# Patient Record
Sex: MALE | Race: BLACK OR AFRICAN AMERICAN | URBAN - METROPOLITAN AREA
[De-identification: names, ages, dates, MRNs, and addresses within clinical notes are randomized per-mention and may not be internally consistent; named-entity substitution may affect disease eponyms.]

---

## 2017-03-12 ENCOUNTER — INPATIENT (INPATIENT)
Facility: HOSPITAL | Age: 67
LOS: 11 days | Discharge: HOME | End: 2017-03-24
Attending: FAMILY MEDICINE | Admitting: INTERNAL MEDICINE
Payer: COMMERCIAL

## 2017-03-12 VITALS
HEART RATE: 67 BPM | TEMPERATURE: 98 F | WEIGHT: 182.1 LBS | HEIGHT: 66 IN | SYSTOLIC BLOOD PRESSURE: 177 MMHG | OXYGEN SATURATION: 99 % | RESPIRATION RATE: 18 BRPM | DIASTOLIC BLOOD PRESSURE: 99 MMHG

## 2017-03-12 LAB
ALBUMIN SERPL ELPH-MCNC: 3 G/DL — LOW (ref 3.3–5)
ALP SERPL-CCNC: 76 U/L — SIGNIFICANT CHANGE UP (ref 40–120)
ALT FLD-CCNC: 20 U/L — SIGNIFICANT CHANGE UP (ref 12–78)
ANION GAP SERPL CALC-SCNC: 7 MMOL/L — SIGNIFICANT CHANGE UP (ref 5–17)
APPEARANCE UR: CLEAR — SIGNIFICANT CHANGE UP
APTT BLD: 27.9 SEC — SIGNIFICANT CHANGE UP (ref 27.5–37.4)
AST SERPL-CCNC: 26 U/L — SIGNIFICANT CHANGE UP (ref 15–37)
BASOPHILS # BLD AUTO: 0.1 K/UL — SIGNIFICANT CHANGE UP (ref 0–0.2)
BASOPHILS NFR BLD AUTO: 1.7 % — SIGNIFICANT CHANGE UP (ref 0–2)
BILIRUB SERPL-MCNC: 0.3 MG/DL — SIGNIFICANT CHANGE UP (ref 0.2–1.2)
BILIRUB UR-MCNC: NEGATIVE — SIGNIFICANT CHANGE UP
BUN SERPL-MCNC: 18 MG/DL — SIGNIFICANT CHANGE UP (ref 7–23)
CALCIUM SERPL-MCNC: 8.5 MG/DL — SIGNIFICANT CHANGE UP (ref 8.5–10.1)
CHLORIDE SERPL-SCNC: 107 MMOL/L — SIGNIFICANT CHANGE UP (ref 96–108)
CK SERPL-CCNC: 225 U/L — SIGNIFICANT CHANGE UP (ref 26–308)
CO2 SERPL-SCNC: 26 MMOL/L — SIGNIFICANT CHANGE UP (ref 22–31)
COLOR SPEC: YELLOW — SIGNIFICANT CHANGE UP
CREAT SERPL-MCNC: 1.13 MG/DL — SIGNIFICANT CHANGE UP (ref 0.5–1.3)
DIFF PNL FLD: NEGATIVE — SIGNIFICANT CHANGE UP
EOSINOPHIL # BLD AUTO: 0.2 K/UL — SIGNIFICANT CHANGE UP (ref 0–0.5)
EOSINOPHIL NFR BLD AUTO: 2.3 % — SIGNIFICANT CHANGE UP (ref 0–6)
GLUCOSE SERPL-MCNC: 261 MG/DL — HIGH (ref 70–99)
GLUCOSE UR QL: 250 MG/DL
HCT VFR BLD CALC: 43.6 % — SIGNIFICANT CHANGE UP (ref 39–50)
HGB BLD-MCNC: 14.6 G/DL — SIGNIFICANT CHANGE UP (ref 13–17)
INR BLD: 1.04 RATIO — SIGNIFICANT CHANGE UP (ref 0.88–1.16)
KETONES UR-MCNC: NEGATIVE — SIGNIFICANT CHANGE UP
LDH SERPL L TO P-CCNC: 326 U/L — HIGH (ref 50–242)
LEUKOCYTE ESTERASE UR-ACNC: NEGATIVE — SIGNIFICANT CHANGE UP
LIDOCAIN IGE QN: 925 U/L — HIGH (ref 73–393)
LYMPHOCYTES # BLD AUTO: 3.6 K/UL — HIGH (ref 1–3.3)
LYMPHOCYTES # BLD AUTO: 43.5 % — SIGNIFICANT CHANGE UP (ref 13–44)
MCHC RBC-ENTMCNC: 29.6 PG — SIGNIFICANT CHANGE UP (ref 27–34)
MCHC RBC-ENTMCNC: 33.6 GM/DL — SIGNIFICANT CHANGE UP (ref 32–36)
MCV RBC AUTO: 88.1 FL — SIGNIFICANT CHANGE UP (ref 80–100)
MONOCYTES # BLD AUTO: 0.5 K/UL — SIGNIFICANT CHANGE UP (ref 0–0.9)
MONOCYTES NFR BLD AUTO: 6.2 % — SIGNIFICANT CHANGE UP (ref 2–14)
NEUTROPHILS # BLD AUTO: 3.8 K/UL — SIGNIFICANT CHANGE UP (ref 1.8–7.4)
NEUTROPHILS NFR BLD AUTO: 46.3 % — SIGNIFICANT CHANGE UP (ref 43–77)
NITRITE UR-MCNC: NEGATIVE — SIGNIFICANT CHANGE UP
PH UR: 6 — SIGNIFICANT CHANGE UP (ref 4.8–8)
PLATELET # BLD AUTO: 248 K/UL — SIGNIFICANT CHANGE UP (ref 150–400)
POTASSIUM SERPL-MCNC: 4.5 MMOL/L — SIGNIFICANT CHANGE UP (ref 3.5–5.3)
POTASSIUM SERPL-SCNC: 4.5 MMOL/L — SIGNIFICANT CHANGE UP (ref 3.5–5.3)
PROT SERPL-MCNC: 7 GM/DL — SIGNIFICANT CHANGE UP (ref 6–8.3)
PROT UR-MCNC: NEGATIVE MG/DL — SIGNIFICANT CHANGE UP
PROTHROM AB SERPL-ACNC: 11.4 SEC — SIGNIFICANT CHANGE UP (ref 10–13.1)
RBC # BLD: 4.95 M/UL — SIGNIFICANT CHANGE UP (ref 4.2–5.8)
RBC # FLD: 11.7 % — SIGNIFICANT CHANGE UP (ref 10.3–14.5)
SODIUM SERPL-SCNC: 140 MMOL/L — SIGNIFICANT CHANGE UP (ref 135–145)
SP GR SPEC: 1.01 — SIGNIFICANT CHANGE UP (ref 1.01–1.02)
TROPONIN I SERPL-MCNC: <0.015 NG/ML — SIGNIFICANT CHANGE UP (ref 0.01–0.04)
UROBILINOGEN FLD QL: NEGATIVE MG/DL — SIGNIFICANT CHANGE UP
WBC # BLD: 8.2 K/UL — SIGNIFICANT CHANGE UP (ref 3.8–10.5)
WBC # FLD AUTO: 8.2 K/UL — SIGNIFICANT CHANGE UP (ref 3.8–10.5)

## 2017-03-12 PROCEDURE — 74177 CT ABD & PELVIS W/CONTRAST: CPT | Mod: 26

## 2017-03-12 PROCEDURE — 99285 EMERGENCY DEPT VISIT HI MDM: CPT

## 2017-03-12 PROCEDURE — 93010 ELECTROCARDIOGRAM REPORT: CPT

## 2017-03-12 PROCEDURE — 71020: CPT | Mod: 26

## 2017-03-12 PROCEDURE — 76705 ECHO EXAM OF ABDOMEN: CPT | Mod: 26

## 2017-03-12 RX ORDER — MORPHINE SULFATE 50 MG/1
4 CAPSULE, EXTENDED RELEASE ORAL ONCE
Qty: 0 | Refills: 0 | Status: DISCONTINUED | OUTPATIENT
Start: 2017-03-12 | End: 2017-03-12

## 2017-03-12 RX ORDER — SODIUM CHLORIDE 9 MG/ML
3 INJECTION INTRAMUSCULAR; INTRAVENOUS; SUBCUTANEOUS ONCE
Qty: 0 | Refills: 0 | Status: COMPLETED | OUTPATIENT
Start: 2017-03-12 | End: 2017-03-12

## 2017-03-12 RX ORDER — SODIUM CHLORIDE 9 MG/ML
1000 INJECTION INTRAMUSCULAR; INTRAVENOUS; SUBCUTANEOUS ONCE
Qty: 0 | Refills: 0 | Status: COMPLETED | OUTPATIENT
Start: 2017-03-12 | End: 2017-03-12

## 2017-03-12 RX ORDER — SODIUM CHLORIDE 9 MG/ML
2000 INJECTION INTRAMUSCULAR; INTRAVENOUS; SUBCUTANEOUS ONCE
Qty: 0 | Refills: 0 | Status: COMPLETED | OUTPATIENT
Start: 2017-03-12 | End: 2017-03-12

## 2017-03-12 RX ORDER — ONDANSETRON 8 MG/1
4 TABLET, FILM COATED ORAL ONCE
Qty: 0 | Refills: 0 | Status: COMPLETED | OUTPATIENT
Start: 2017-03-12 | End: 2017-03-12

## 2017-03-12 RX ADMIN — SODIUM CHLORIDE 3 MILLILITER(S): 9 INJECTION INTRAMUSCULAR; INTRAVENOUS; SUBCUTANEOUS at 21:15

## 2017-03-12 RX ADMIN — SODIUM CHLORIDE 1000 MILLILITER(S): 9 INJECTION INTRAMUSCULAR; INTRAVENOUS; SUBCUTANEOUS at 23:48

## 2017-03-12 RX ADMIN — SODIUM CHLORIDE 1000 MILLILITER(S): 9 INJECTION INTRAMUSCULAR; INTRAVENOUS; SUBCUTANEOUS at 21:25

## 2017-03-12 RX ADMIN — ONDANSETRON 4 MILLIGRAM(S): 8 TABLET, FILM COATED ORAL at 21:24

## 2017-03-12 RX ADMIN — MORPHINE SULFATE 4 MILLIGRAM(S): 50 CAPSULE, EXTENDED RELEASE ORAL at 21:24

## 2017-03-12 NOTE — ED STATDOCS - NS ED MD SCRIBE ATTENDING SCRIBE SECTIONS
PHYSICAL EXAM/DISPOSITION/REVIEW OF SYSTEMS/PAST MEDICAL/SURGICAL/SOCIAL HISTORY/HISTORY OF PRESENT ILLNESS RESULTS/PHYSICAL EXAM/HISTORY OF PRESENT ILLNESS/PAST MEDICAL/SURGICAL/SOCIAL HISTORY/REVIEW OF SYSTEMS/DISPOSITION

## 2017-03-12 NOTE — ED STATDOCS - DETAILS:
Attending Contribution to Care: I, Dayna Rodriguez, performed the initial face to face bedside interview with this patient regarding history of present illness, review of symptoms and relevant past medical, social and family history.  I completed an independent physical examination.  I was the initial provider who evaluated this patient. I have signed out the follow up of any pending tests (i.e. labs, radiological studies) to the ACP.  I have communicated the patient’s plan of care and disposition with the ACP.

## 2017-03-12 NOTE — ED STATDOCS - OBJECTIVE STATEMENT
68 y/o M with hx of cholecystectomy 2 years ago by Dr Welch, HTN, HLD, DM type 2 on metformin and pancreatitis presents to ED for abdominal pain x5 days. Pain is epigastric and wraps around to both sides. Pain is worse when eating heavier foods. + nausea and mild SOB. Denies CP, VD, fever, sweats. Sx are similar to when he had pancreatitis. No other complaints. PMD-Yuri.

## 2017-03-13 DIAGNOSIS — Z29.9 ENCOUNTER FOR PROPHYLACTIC MEASURES, UNSPECIFIED: ICD-10-CM

## 2017-03-13 DIAGNOSIS — E11.9 TYPE 2 DIABETES MELLITUS WITHOUT COMPLICATIONS: ICD-10-CM

## 2017-03-13 DIAGNOSIS — Z90.49 ACQUIRED ABSENCE OF OTHER SPECIFIED PARTS OF DIGESTIVE TRACT: Chronic | ICD-10-CM

## 2017-03-13 DIAGNOSIS — I10 ESSENTIAL (PRIMARY) HYPERTENSION: ICD-10-CM

## 2017-03-13 DIAGNOSIS — K85.00 IDIOPATHIC ACUTE PANCREATITIS WITHOUT NECROSIS OR INFECTION: ICD-10-CM

## 2017-03-13 LAB
ALBUMIN SERPL ELPH-MCNC: 2.7 G/DL — LOW (ref 3.3–5)
ALP SERPL-CCNC: 71 U/L — SIGNIFICANT CHANGE UP (ref 40–120)
ALT FLD-CCNC: 16 U/L — SIGNIFICANT CHANGE UP (ref 12–78)
ANION GAP SERPL CALC-SCNC: 7 MMOL/L — SIGNIFICANT CHANGE UP (ref 5–17)
AST SERPL-CCNC: 14 U/L — LOW (ref 15–37)
BASOPHILS # BLD AUTO: 0.1 K/UL — SIGNIFICANT CHANGE UP (ref 0–0.2)
BASOPHILS NFR BLD AUTO: 1.2 % — SIGNIFICANT CHANGE UP (ref 0–2)
BILIRUB SERPL-MCNC: 0.2 MG/DL — SIGNIFICANT CHANGE UP (ref 0.2–1.2)
BUN SERPL-MCNC: 11 MG/DL — SIGNIFICANT CHANGE UP (ref 7–23)
CALCIUM SERPL-MCNC: 7.8 MG/DL — LOW (ref 8.5–10.1)
CHLORIDE SERPL-SCNC: 108 MMOL/L — SIGNIFICANT CHANGE UP (ref 96–108)
CHOLEST SERPL-MCNC: 142 MG/DL — SIGNIFICANT CHANGE UP (ref 10–199)
CK SERPL-CCNC: 158 U/L — SIGNIFICANT CHANGE UP (ref 26–308)
CO2 SERPL-SCNC: 28 MMOL/L — SIGNIFICANT CHANGE UP (ref 22–31)
CREAT SERPL-MCNC: 0.88 MG/DL — SIGNIFICANT CHANGE UP (ref 0.5–1.3)
EOSINOPHIL # BLD AUTO: 0.2 K/UL — SIGNIFICANT CHANGE UP (ref 0–0.5)
EOSINOPHIL NFR BLD AUTO: 2 % — SIGNIFICANT CHANGE UP (ref 0–6)
GLUCOSE SERPL-MCNC: 67 MG/DL — LOW (ref 70–99)
HCT VFR BLD CALC: 42.1 % — SIGNIFICANT CHANGE UP (ref 39–50)
HDLC SERPL-MCNC: 39 MG/DL — LOW (ref 40–125)
HGB BLD-MCNC: 13.8 G/DL — SIGNIFICANT CHANGE UP (ref 13–17)
LACTATE SERPL-SCNC: 1 MMOL/L — SIGNIFICANT CHANGE UP (ref 0.7–2)
LIPID PNL WITH DIRECT LDL SERPL: 87 MG/DL — SIGNIFICANT CHANGE UP
LYMPHOCYTES # BLD AUTO: 3.6 K/UL — HIGH (ref 1–3.3)
LYMPHOCYTES # BLD AUTO: 41.7 % — SIGNIFICANT CHANGE UP (ref 13–44)
MAGNESIUM SERPL-MCNC: 1.9 MG/DL — SIGNIFICANT CHANGE UP (ref 1.8–2.4)
MCHC RBC-ENTMCNC: 29.3 PG — SIGNIFICANT CHANGE UP (ref 27–34)
MCHC RBC-ENTMCNC: 32.7 GM/DL — SIGNIFICANT CHANGE UP (ref 32–36)
MCV RBC AUTO: 89.6 FL — SIGNIFICANT CHANGE UP (ref 80–100)
MONOCYTES # BLD AUTO: 0.8 K/UL — SIGNIFICANT CHANGE UP (ref 0–0.9)
MONOCYTES NFR BLD AUTO: 9.6 % — SIGNIFICANT CHANGE UP (ref 2–14)
NEUTROPHILS # BLD AUTO: 4 K/UL — SIGNIFICANT CHANGE UP (ref 1.8–7.4)
NEUTROPHILS NFR BLD AUTO: 45.5 % — SIGNIFICANT CHANGE UP (ref 43–77)
PLATELET # BLD AUTO: 252 K/UL — SIGNIFICANT CHANGE UP (ref 150–400)
POTASSIUM SERPL-MCNC: 3.3 MMOL/L — LOW (ref 3.5–5.3)
POTASSIUM SERPL-SCNC: 3.3 MMOL/L — LOW (ref 3.5–5.3)
PROT SERPL-MCNC: 6.2 GM/DL — SIGNIFICANT CHANGE UP (ref 6–8.3)
RBC # BLD: 4.7 M/UL — SIGNIFICANT CHANGE UP (ref 4.2–5.8)
RBC # FLD: 11.7 % — SIGNIFICANT CHANGE UP (ref 10.3–14.5)
SODIUM SERPL-SCNC: 143 MMOL/L — SIGNIFICANT CHANGE UP (ref 135–145)
TOTAL CHOLESTEROL/HDL RATIO MEASUREMENT: 3.6 RATIO — SIGNIFICANT CHANGE UP (ref 3.4–9.6)
TRIGL SERPL-MCNC: 82 MG/DL — SIGNIFICANT CHANGE UP (ref 10–149)
TROPONIN I SERPL-MCNC: <0.015 NG/ML — SIGNIFICANT CHANGE UP (ref 0.01–0.04)
WBC # BLD: 8.7 K/UL — SIGNIFICANT CHANGE UP (ref 3.8–10.5)
WBC # FLD AUTO: 8.7 K/UL — SIGNIFICANT CHANGE UP (ref 3.8–10.5)

## 2017-03-13 RX ORDER — ONDANSETRON 8 MG/1
4 TABLET, FILM COATED ORAL EVERY 6 HOURS
Qty: 0 | Refills: 0 | Status: DISCONTINUED | OUTPATIENT
Start: 2017-03-13 | End: 2017-03-24

## 2017-03-13 RX ORDER — ACETAMINOPHEN 500 MG
650 TABLET ORAL EVERY 6 HOURS
Qty: 0 | Refills: 0 | Status: DISCONTINUED | OUTPATIENT
Start: 2017-03-13 | End: 2017-03-24

## 2017-03-13 RX ORDER — MORPHINE SULFATE 50 MG/1
2 CAPSULE, EXTENDED RELEASE ORAL ONCE
Qty: 0 | Refills: 0 | Status: DISCONTINUED | OUTPATIENT
Start: 2017-03-13 | End: 2017-03-13

## 2017-03-13 RX ORDER — INSULIN LISPRO 100/ML
VIAL (ML) SUBCUTANEOUS EVERY 6 HOURS
Qty: 0 | Refills: 0 | Status: DISCONTINUED | OUTPATIENT
Start: 2017-03-13 | End: 2017-03-14

## 2017-03-13 RX ORDER — DEXTROSE 50 % IN WATER 50 %
25 SYRINGE (ML) INTRAVENOUS ONCE
Qty: 0 | Refills: 0 | Status: DISCONTINUED | OUTPATIENT
Start: 2017-03-13 | End: 2017-03-14

## 2017-03-13 RX ORDER — MORPHINE SULFATE 50 MG/1
2 CAPSULE, EXTENDED RELEASE ORAL EVERY 6 HOURS
Qty: 0 | Refills: 0 | Status: DISCONTINUED | OUTPATIENT
Start: 2017-03-13 | End: 2017-03-13

## 2017-03-13 RX ORDER — SIMETHICONE 80 MG/1
80 TABLET, CHEWABLE ORAL EVERY 6 HOURS
Qty: 0 | Refills: 0 | Status: DISCONTINUED | OUTPATIENT
Start: 2017-03-13 | End: 2017-03-24

## 2017-03-13 RX ORDER — MORPHINE SULFATE 50 MG/1
4 CAPSULE, EXTENDED RELEASE ORAL EVERY 4 HOURS
Qty: 0 | Refills: 0 | Status: DISCONTINUED | OUTPATIENT
Start: 2017-03-13 | End: 2017-03-13

## 2017-03-13 RX ORDER — SODIUM CHLORIDE 9 MG/ML
1000 INJECTION, SOLUTION INTRAVENOUS
Qty: 0 | Refills: 0 | Status: DISCONTINUED | OUTPATIENT
Start: 2017-03-13 | End: 2017-03-14

## 2017-03-13 RX ORDER — SODIUM CHLORIDE 9 MG/ML
1000 INJECTION INTRAMUSCULAR; INTRAVENOUS; SUBCUTANEOUS ONCE
Qty: 0 | Refills: 0 | Status: DISCONTINUED | OUTPATIENT
Start: 2017-03-13 | End: 2017-03-24

## 2017-03-13 RX ORDER — LISINOPRIL 2.5 MG/1
40 TABLET ORAL DAILY
Qty: 0 | Refills: 0 | Status: DISCONTINUED | OUTPATIENT
Start: 2017-03-13 | End: 2017-03-13

## 2017-03-13 RX ORDER — DEXTROSE 50 % IN WATER 50 %
12.5 SYRINGE (ML) INTRAVENOUS ONCE
Qty: 0 | Refills: 0 | Status: DISCONTINUED | OUTPATIENT
Start: 2017-03-13 | End: 2017-03-14

## 2017-03-13 RX ORDER — SODIUM CHLORIDE 9 MG/ML
1000 INJECTION INTRAMUSCULAR; INTRAVENOUS; SUBCUTANEOUS
Qty: 0 | Refills: 0 | Status: DISCONTINUED | OUTPATIENT
Start: 2017-03-13 | End: 2017-03-19

## 2017-03-13 RX ORDER — DOCUSATE SODIUM 100 MG
100 CAPSULE ORAL THREE TIMES A DAY
Qty: 0 | Refills: 0 | Status: DISCONTINUED | OUTPATIENT
Start: 2017-03-13 | End: 2017-03-24

## 2017-03-13 RX ORDER — HYDROMORPHONE HYDROCHLORIDE 2 MG/ML
1 INJECTION INTRAMUSCULAR; INTRAVENOUS; SUBCUTANEOUS
Qty: 0 | Refills: 0 | Status: DISCONTINUED | OUTPATIENT
Start: 2017-03-13 | End: 2017-03-13

## 2017-03-13 RX ORDER — POTASSIUM CHLORIDE 20 MEQ
10 PACKET (EA) ORAL
Qty: 0 | Refills: 0 | Status: COMPLETED | OUTPATIENT
Start: 2017-03-13 | End: 2017-03-13

## 2017-03-13 RX ORDER — SENNA PLUS 8.6 MG/1
2 TABLET ORAL AT BEDTIME
Qty: 0 | Refills: 0 | Status: DISCONTINUED | OUTPATIENT
Start: 2017-03-13 | End: 2017-03-24

## 2017-03-13 RX ORDER — DEXTROSE 50 % IN WATER 50 %
1 SYRINGE (ML) INTRAVENOUS ONCE
Qty: 0 | Refills: 0 | Status: DISCONTINUED | OUTPATIENT
Start: 2017-03-13 | End: 2017-03-14

## 2017-03-13 RX ORDER — MORPHINE SULFATE 50 MG/1
2 CAPSULE, EXTENDED RELEASE ORAL
Qty: 0 | Refills: 0 | Status: DISCONTINUED | OUTPATIENT
Start: 2017-03-13 | End: 2017-03-13

## 2017-03-13 RX ORDER — MORPHINE SULFATE 50 MG/1
4 CAPSULE, EXTENDED RELEASE ORAL ONCE
Qty: 0 | Refills: 0 | Status: DISCONTINUED | OUTPATIENT
Start: 2017-03-13 | End: 2017-03-13

## 2017-03-13 RX ORDER — GLUCAGON INJECTION, SOLUTION 0.5 MG/.1ML
1 INJECTION, SOLUTION SUBCUTANEOUS ONCE
Qty: 0 | Refills: 0 | Status: DISCONTINUED | OUTPATIENT
Start: 2017-03-13 | End: 2017-03-14

## 2017-03-13 RX ORDER — HYDROMORPHONE HYDROCHLORIDE 2 MG/ML
0.5 INJECTION INTRAMUSCULAR; INTRAVENOUS; SUBCUTANEOUS
Qty: 0 | Refills: 0 | Status: DISCONTINUED | OUTPATIENT
Start: 2017-03-13 | End: 2017-03-20

## 2017-03-13 RX ADMIN — HYDROMORPHONE HYDROCHLORIDE 0.5 MILLIGRAM(S): 2 INJECTION INTRAMUSCULAR; INTRAVENOUS; SUBCUTANEOUS at 14:35

## 2017-03-13 RX ADMIN — HYDROMORPHONE HYDROCHLORIDE 0.5 MILLIGRAM(S): 2 INJECTION INTRAMUSCULAR; INTRAVENOUS; SUBCUTANEOUS at 14:55

## 2017-03-13 RX ADMIN — MORPHINE SULFATE 2 MILLIGRAM(S): 50 CAPSULE, EXTENDED RELEASE ORAL at 04:24

## 2017-03-13 RX ADMIN — SIMETHICONE 80 MILLIGRAM(S): 80 TABLET, CHEWABLE ORAL at 13:11

## 2017-03-13 RX ADMIN — MORPHINE SULFATE 4 MILLIGRAM(S): 50 CAPSULE, EXTENDED RELEASE ORAL at 01:51

## 2017-03-13 RX ADMIN — Medication 100 MILLIEQUIVALENT(S): at 13:08

## 2017-03-13 RX ADMIN — HYDROMORPHONE HYDROCHLORIDE 0.5 MILLIGRAM(S): 2 INJECTION INTRAMUSCULAR; INTRAVENOUS; SUBCUTANEOUS at 22:11

## 2017-03-13 RX ADMIN — MORPHINE SULFATE 2 MILLIGRAM(S): 50 CAPSULE, EXTENDED RELEASE ORAL at 04:27

## 2017-03-13 RX ADMIN — MORPHINE SULFATE 2 MILLIGRAM(S): 50 CAPSULE, EXTENDED RELEASE ORAL at 12:05

## 2017-03-13 RX ADMIN — MORPHINE SULFATE 2 MILLIGRAM(S): 50 CAPSULE, EXTENDED RELEASE ORAL at 06:56

## 2017-03-13 RX ADMIN — LISINOPRIL 40 MILLIGRAM(S): 2.5 TABLET ORAL at 05:02

## 2017-03-13 RX ADMIN — Medication 100 MILLIEQUIVALENT(S): at 15:16

## 2017-03-13 RX ADMIN — SODIUM CHLORIDE 125 MILLILITER(S): 9 INJECTION INTRAMUSCULAR; INTRAVENOUS; SUBCUTANEOUS at 13:08

## 2017-03-13 RX ADMIN — SODIUM CHLORIDE 125 MILLILITER(S): 9 INJECTION INTRAMUSCULAR; INTRAVENOUS; SUBCUTANEOUS at 05:03

## 2017-03-13 RX ADMIN — Medication 100 MILLIEQUIVALENT(S): at 14:17

## 2017-03-13 NOTE — CONSULT NOTE ADULT - SUBJECTIVE AND OBJECTIVE BOX
HPI:  66 yo man was admitted with epigastric pain over the past few days. Worse after food. No fever. No n/v. Has hx of recurrent pancreatitis, etiology unclear. S/P cholecystectomy. EUS performed notable for mild parenchymal changes, but did not meet all EUS criteria for chronic pancreatitis. Thought was that pancreatitis may be due to ACE-I.    Still with pain. No n/v. No fever.     PAST MEDICAL & SURGICAL HISTORY:  DM (diabetes mellitus)  HTN (hypertension)  Pancreatitis  S/P laparoscopic cholecystectomy      MEDICATIONS  (STANDING):  sodium chloride 0.9% Bolus 1000milliLiter(s) IV Bolus once  lisinopril 40milliGRAM(s) Oral daily  sodium chloride 0.9%. 1000milliLiter(s) IV Continuous <Continuous>    MEDICATIONS  (PRN):  acetaminophen   Tablet 650milliGRAM(s) Oral every 6 hours PRN For Temp greater than 38 C (100.4 F)  acetaminophen   Tablet. 650milliGRAM(s) Oral every 6 hours PRN Mild Pain (1 - 3)  ondansetron Injectable 4milliGRAM(s) IV Push every 6 hours PRN Nausea  senna 2Tablet(s) Oral at bedtime PRN Constipation  docusate sodium 100milliGRAM(s) Oral three times a day PRN Constipation  morphine  - Injectable 2milliGRAM(s) IV Push every 6 hours PRN Moderate Pain (4 - 6)  morphine  - Injectable 4milliGRAM(s) IV Push every 4 hours PRN Severe Pain (7 - 10)      Allergies    No Known Allergies    Intolerances        SOCIAL HISTORY:  No smoking, social alcohol use, no recreational drug use    FAMILY HISTORY:  No pertinent family history in first degree relatives      REVIEW OF SYSTEMS    General: no unexpected weight loss    HEENT: no icterus    Respiratory and Thorax: no SOB  	  Cardiovascular: no CP    Gastrointestinal: as above    : no dysuria    Musculoskeletal: no myalgias    Skin: no jaundice    Neuro: no headaches or dizziness    Vital Signs Last 24 Hrs  T(C): 36.4, Max: 36.8 (03-13 @ 01:19)  T(F): 97.5, Max: 98.2 (03-13 @ 01:19)  HR: 64 (53 - 67)  BP: 138/70 (132/75 - 177/99)  BP(mean): --  RR: 18 (16 - 18)  SpO2: 100% (97% - 100%)    PHYSICAL EXAM:    Constitutional: NAD    Head: NCAT    HEENT: anicteric    Neck: no abnormal lymphadenopathy    Respiratory: CTA BL    Cardiovascular:  RRR    Gastrointestinal: soft ND +BS +epigastric tenderness    Extremities: no LE edema    Neuro: no focal deficits    Skin: no jaundice      LABS:                        13.8   8.7   )-----------( 252      ( 13 Mar 2017 07:23 )             42.1     13 Mar 2017 07:23    143    |  108    |  11     ----------------------------<  67     3.3     |  28     |  0.88     Ca    7.8        13 Mar 2017 07:23  Mg     1.9       13 Mar 2017 07:23    TPro  6.2    /  Alb  2.7    /  TBili  0.2    /  DBili  x      /  AST  14     /  ALT  16     /  AlkPhos  71     13 Mar 2017 07:23    PT/INR - ( 12 Mar 2017 21:22 )   PT: 11.4 sec;   INR: 1.04 ratio         PTT - ( 12 Mar 2017 21:22 )  PTT:27.9 sec  LIVER FUNCTIONS - ( 13 Mar 2017 07:23 )  Alb: 2.7 g/dL / Pro: 6.2 gm/dL / ALK PHOS: 71 U/L / ALT: 16 U/L / AST: 14 U/L / GGT: x             RADIOLOGY & ADDITIONAL STUDIES:  EXAM:  CT ABDOMEN AND PELVIS IC                            PROCEDURE DATE:  03/12/2017        INTERPRETATION:  Abdominal/Pelvic CT    3/13/2017 12:18 AM    Indication: Abdominal pain, pancreatitis    Technique: Axial images were obtained following IV contrast from the lung   bases through pubic symphysis.  90 cc of Omnipaque 350 was administered   intravenously without complication and 10 cc was discarded.  Reformatted   coronal and sagittal images are submitted.    Comparison: MRI of July 18, 2016    FINDINGS:    LUNG BASES:  There are dependent atelectatic changes at the lung bases.   PERITONEUM:  There is no free air or focal collection.  No free fluid.  LIVER: Normal.  SPLEEN: Normal.  GALLBLADDER: Not visualized.  BILIARY TREE: Unremarkable.  PANCREAS: Normal.  ADRENAL GLANDS: Normal.  KIDNEYS: Normal.  BOWEL: The stomach is incompletely distended.  There is no small bowel   obstruction, focal bowel wall thickening. The appendix is unremarkable.   Diverticulosis. Moderate fecal load.    URINARY BLADDER: Unremarkable.  PELVIC ORGANS: The prostate is enlarged, measuring 5.6 x 5.1 x 5.2 cm.    There is no significant adenopathy.  VASCULATURE: Unremarkable.  RETROPERITONEUM:  There is no mass.  BONES: Unremarkable.  ABDOMINAL WALL:Fat-containing of focal hernia.    IMPRESSION:    No explanation for abdominal pain on this CT. unremarkable CT appearance   of the pancreas.  Moderate fecal load.  Enlarged prostate gland.

## 2017-03-13 NOTE — PATIENT PROFILE ADULT. - VISION (WITH CORRECTIVE LENSES IF THE PATIENT USUALLY WEARS THEM):
Partially impaired: cannot see medication labels or newsprint, but can see obstacles in path, and the surrounding layout; can count fingers at arm's length/one pair

## 2017-03-13 NOTE — ED ADULT NURSE NOTE - ED STAT RN HANDOFF DETAILS
Received patient from Elieser LARSON @ 0230 am- No acute distress noted- Report given to MARSHA Zepeda @ 2SW @ 1000- Safety maintained

## 2017-03-13 NOTE — H&P ADULT - PROBLEM SELECTOR PLAN 1
~admit to Medicine  ~f/u w/ GI consult  ~NPO for now; advance as tolerated  ~f/u Lipid profile  ~cont. IV hydration  ~cont. pain management

## 2017-03-13 NOTE — H&P ADULT - NSHPPHYSICALEXAM_GEN_ALL_CORE
Vital Signs Last 24 Hrs  T(C): 36.3, Max: 36.8 (03-13 @ 01:19)  T(F): 97.4, Max: 98.2 (03-13 @ 01:19)  HR: 53 (53 - 67)  BP: 144/87 (144/87 - 177/99)  BP(mean): --  RR: 16 (16 - 18)  SpO2: 100% (97% - 100%)

## 2017-03-13 NOTE — H&P ADULT - HISTORY OF PRESENT ILLNESS
68 y/o M PMHx significant for DM type 2, HLD, HTN, and recurrent Pancreatitis, s/p cholecystectomy, who presents to the ED w/ c/o epigastric abdominal pain, moderate intensity, radiating around to the back, aggravated with food. The patient notes that his symptoms have progressively worsened over the past few 4-5 days. The patient denies any associated fevers. The patient has had prior admissions for pancreatitis. In the ED the patient was found to have an elevated Lipase 925.

## 2017-03-13 NOTE — CHART NOTE - NSCHARTNOTEFT_GEN_A_CORE
This is addendum to admission note     Pt was seen and examined, Chart, meds,  labs and radiology reviwed. Pt reports some improvement of pain. Felt some HA and dizziness yearly am, resolved now.  POC discussed     Vital Signs Last 24 Hrs  T(C): 36.7, Max: 36.8 ( @ 01:19)  T(F): 98.1, Max: 98.2 ( @ 01:19)  HR: 59 (53 - 67)  BP: 125/69 (114/57 - 177/99)  BP(mean): --  RR: 18 (16 - 18)  SpO2: 97% (97% - 100%)                          13.8   8.7   )-----------( 252      ( 13 Mar 2017 07:23 )             42.1     13 Mar 2017 07:23    143    |  108    |  11     ----------------------------<  67     3.3     |  28     |  0.88     Ca    7.8        13 Mar 2017 07:23  Mg     1.9       13 Mar 2017 07:23    TPro  6.2    /  Alb  2.7    /  TBili  0.2    /  DBili  x      /  AST  14     /  ALT  16     /  AlkPhos  71     13 Mar 2017 07:23    CARDIAC MARKERS ( 13 Mar 2017 06:48 )  <0.015 ng/mL / x     / 158 U/L / x     / x      CARDIAC MARKERS ( 12 Mar 2017 21:22 )  <0.015 ng/mL / x     / 225 U/L / x     / x        LIVER FUNCTIONS - ( 13 Mar 2017 07:23 )  Alb: 2.7 g/dL / Pro: 6.2 gm/dL / ALK PHOS: 71 U/L / ALT: 16 U/L / AST: 14 U/L / GGT: x           PT/INR - ( 12 Mar 2017 21:22 )   PT: 11.4 sec;   INR: 1.04 ratio      PTT - ( 12 Mar 2017 21:22 )  PTT:27.9 sec  Urinalysis Basic - ( 12 Mar 2017 21:22 )  Color: Yellow / Appearance: Clear / S.015 / pH: x  Gluc: x / Ketone: Negative  / Bili: Negative / Urobili: Negative mg/dL   Blood: x / Protein: Negative mg/dL / Nitrite: Negative   Leuk Esterase: Negative / RBC: x / WBC x   Sq Epi: x / Non Sq Epi: x / Bacteria: x    Lactate, Blood: 1.0 mmol/L ( @ 23:47)    Lipid Profile (17 @ 07:23)    HDL/Total Cholesterol Ratio Measurement: 3.6 RATIO    Cholesterol, Serum: 142 mg/dL    Triglycerides, Serum: 82 mg/dL    HDL Cholesterol, Serum: 39 mg/dL    Direct LDL: 87: LDL Cholesterol --- Interpretive Comment (for adults 18 and over)  Optimal LDL Level may vary based on clinical situation  Below 70                  Ideal for people at very high risk of heart  disease  Below 100                Ideal for people at ris87: k of heart disease  100 - 129                   [place]  130 - 159                   Borderline high  160 - 189                   High  190 and Above          Very high mg/dL    Assessment and Plan: 	  68 y/o M PMHx significant for DM type 2, HLD, HTN, and recurrent Pancreatitis, s/p cholecystectomy, who presents to the ED w/ c/o epigastric abdominal pain, moderate intensity, radiating around to the back, aggravated with food, symptoms have progressively worsened over the past few 4-5 days. The patient denies  any associated fevers. The patient has had prior admissions for pancreatitis.     1. Idiopathic acute pancreatitis without infection or necrosis.   C/w NPO for now;  - IVF   - control pain   -  IV hydration  - D/w Dr. Louis, had EUS, has some parenchymal changes, but did not meet criteria for Chronic pancreatitis. Possibly 2/2 ACEI   - D/c lisinopril for now        2. Type 2 diabetes mellitus without complication, with long-term current use of insulin.    - FS q6h  - ISS        3. Essential hypertension.   - d/c  Lisinopril.   - Monitor BP, will start on other antiHTN if start trending up     4. DVT/GI PPxs

## 2017-03-13 NOTE — CONSULT NOTE ADULT - ASSESSMENT
68 yo man admitted with recurrent pancreatitis    -DC ACE I, should start an alternative anti-hypertensive  -NPO  -IVF  -Trend BUN, HCT, UOP

## 2017-03-14 DIAGNOSIS — E83.42 HYPOMAGNESEMIA: ICD-10-CM

## 2017-03-14 DIAGNOSIS — E87.6 HYPOKALEMIA: ICD-10-CM

## 2017-03-14 LAB
AMYLASE P1 CFR SERPL: 94 U/L — SIGNIFICANT CHANGE UP (ref 25–115)
ANION GAP SERPL CALC-SCNC: 8 MMOL/L — SIGNIFICANT CHANGE UP (ref 5–17)
BUN SERPL-MCNC: 7 MG/DL — SIGNIFICANT CHANGE UP (ref 7–23)
CALCIUM SERPL-MCNC: 8.5 MG/DL — SIGNIFICANT CHANGE UP (ref 8.5–10.1)
CHLORIDE SERPL-SCNC: 106 MMOL/L — SIGNIFICANT CHANGE UP (ref 96–108)
CO2 SERPL-SCNC: 27 MMOL/L — SIGNIFICANT CHANGE UP (ref 22–31)
CREAT SERPL-MCNC: 0.95 MG/DL — SIGNIFICANT CHANGE UP (ref 0.5–1.3)
FERRITIN SERPL-MCNC: 118.7 NG/ML — SIGNIFICANT CHANGE UP (ref 30–400)
GLUCOSE SERPL-MCNC: 78 MG/DL — SIGNIFICANT CHANGE UP (ref 70–99)
HBA1C BLD-MCNC: 11.7 % — HIGH (ref 4–5.6)
IRON SATN MFR SERPL: 13 % — LOW (ref 16–55)
IRON SATN MFR SERPL: 31 UG/DL — LOW (ref 45–165)
LIDOCAIN IGE QN: 384 U/L — SIGNIFICANT CHANGE UP (ref 73–393)
MAGNESIUM SERPL-MCNC: 1.8 MG/DL — SIGNIFICANT CHANGE UP (ref 1.8–2.4)
PHOSPHATE SERPL-MCNC: 2.8 MG/DL — SIGNIFICANT CHANGE UP (ref 2.5–4.5)
POTASSIUM SERPL-MCNC: 3.9 MMOL/L — SIGNIFICANT CHANGE UP (ref 3.5–5.3)
POTASSIUM SERPL-SCNC: 3.9 MMOL/L — SIGNIFICANT CHANGE UP (ref 3.5–5.3)
SODIUM SERPL-SCNC: 141 MMOL/L — SIGNIFICANT CHANGE UP (ref 135–145)
TIBC SERPL-MCNC: 237 UG/DL — SIGNIFICANT CHANGE UP (ref 220–430)
TRANSFERRIN SERPL-MCNC: 174 MG/DL — LOW (ref 200–360)
UIBC SERPL-MCNC: 206 UG/DL — SIGNIFICANT CHANGE UP (ref 110–370)

## 2017-03-14 RX ORDER — INSULIN LISPRO 100/ML
VIAL (ML) SUBCUTANEOUS AT BEDTIME
Qty: 0 | Refills: 0 | Status: DISCONTINUED | OUTPATIENT
Start: 2017-03-14 | End: 2017-03-23

## 2017-03-14 RX ORDER — DEXTROSE 50 % IN WATER 50 %
25 SYRINGE (ML) INTRAVENOUS ONCE
Qty: 0 | Refills: 0 | Status: DISCONTINUED | OUTPATIENT
Start: 2017-03-14 | End: 2017-03-23

## 2017-03-14 RX ORDER — DEXTROSE 50 % IN WATER 50 %
1 SYRINGE (ML) INTRAVENOUS ONCE
Qty: 0 | Refills: 0 | Status: DISCONTINUED | OUTPATIENT
Start: 2017-03-14 | End: 2017-03-23

## 2017-03-14 RX ORDER — PANTOPRAZOLE SODIUM 20 MG/1
40 TABLET, DELAYED RELEASE ORAL DAILY
Qty: 0 | Refills: 0 | Status: DISCONTINUED | OUTPATIENT
Start: 2017-03-14 | End: 2017-03-24

## 2017-03-14 RX ORDER — INSULIN LISPRO 100/ML
VIAL (ML) SUBCUTANEOUS
Qty: 0 | Refills: 0 | Status: DISCONTINUED | OUTPATIENT
Start: 2017-03-14 | End: 2017-03-23

## 2017-03-14 RX ORDER — DEXTROSE 50 % IN WATER 50 %
12.5 SYRINGE (ML) INTRAVENOUS ONCE
Qty: 0 | Refills: 0 | Status: DISCONTINUED | OUTPATIENT
Start: 2017-03-14 | End: 2017-03-23

## 2017-03-14 RX ORDER — SODIUM CHLORIDE 9 MG/ML
1000 INJECTION, SOLUTION INTRAVENOUS
Qty: 0 | Refills: 0 | Status: DISCONTINUED | OUTPATIENT
Start: 2017-03-14 | End: 2017-03-23

## 2017-03-14 RX ORDER — MAGNESIUM OXIDE 400 MG ORAL TABLET 241.3 MG
400 TABLET ORAL
Qty: 0 | Refills: 0 | Status: COMPLETED | OUTPATIENT
Start: 2017-03-14 | End: 2017-03-15

## 2017-03-14 RX ORDER — AMLODIPINE BESYLATE 2.5 MG/1
5 TABLET ORAL DAILY
Qty: 0 | Refills: 0 | Status: DISCONTINUED | OUTPATIENT
Start: 2017-03-14 | End: 2017-03-24

## 2017-03-14 RX ORDER — GLUCAGON INJECTION, SOLUTION 0.5 MG/.1ML
1 INJECTION, SOLUTION SUBCUTANEOUS ONCE
Qty: 0 | Refills: 0 | Status: DISCONTINUED | OUTPATIENT
Start: 2017-03-14 | End: 2017-03-23

## 2017-03-14 RX ORDER — POLYETHYLENE GLYCOL 3350 17 G/17G
17 POWDER, FOR SOLUTION ORAL
Qty: 0 | Refills: 0 | Status: DISCONTINUED | OUTPATIENT
Start: 2017-03-14 | End: 2017-03-24

## 2017-03-14 RX ADMIN — SODIUM CHLORIDE 125 MILLILITER(S): 9 INJECTION INTRAMUSCULAR; INTRAVENOUS; SUBCUTANEOUS at 02:08

## 2017-03-14 RX ADMIN — HYDROMORPHONE HYDROCHLORIDE 0.5 MILLIGRAM(S): 2 INJECTION INTRAMUSCULAR; INTRAVENOUS; SUBCUTANEOUS at 19:52

## 2017-03-14 RX ADMIN — PANTOPRAZOLE SODIUM 40 MILLIGRAM(S): 20 TABLET, DELAYED RELEASE ORAL at 15:15

## 2017-03-14 RX ADMIN — AMLODIPINE BESYLATE 5 MILLIGRAM(S): 2.5 TABLET ORAL at 16:07

## 2017-03-14 RX ADMIN — SIMETHICONE 80 MILLIGRAM(S): 80 TABLET, CHEWABLE ORAL at 02:09

## 2017-03-14 RX ADMIN — MAGNESIUM OXIDE 400 MG ORAL TABLET 400 MILLIGRAM(S): 241.3 TABLET ORAL at 16:07

## 2017-03-14 RX ADMIN — POLYETHYLENE GLYCOL 3350 17 GRAM(S): 17 POWDER, FOR SOLUTION ORAL at 15:15

## 2017-03-14 RX ADMIN — SODIUM CHLORIDE 125 MILLILITER(S): 9 INJECTION INTRAMUSCULAR; INTRAVENOUS; SUBCUTANEOUS at 10:43

## 2017-03-14 RX ADMIN — HYDROMORPHONE HYDROCHLORIDE 0.5 MILLIGRAM(S): 2 INJECTION INTRAMUSCULAR; INTRAVENOUS; SUBCUTANEOUS at 15:40

## 2017-03-14 RX ADMIN — HYDROMORPHONE HYDROCHLORIDE 0.5 MILLIGRAM(S): 2 INJECTION INTRAMUSCULAR; INTRAVENOUS; SUBCUTANEOUS at 18:58

## 2017-03-14 RX ADMIN — HYDROMORPHONE HYDROCHLORIDE 0.5 MILLIGRAM(S): 2 INJECTION INTRAMUSCULAR; INTRAVENOUS; SUBCUTANEOUS at 05:17

## 2017-03-14 RX ADMIN — SODIUM CHLORIDE 125 MILLILITER(S): 9 INJECTION INTRAMUSCULAR; INTRAVENOUS; SUBCUTANEOUS at 18:55

## 2017-03-14 RX ADMIN — HYDROMORPHONE HYDROCHLORIDE 0.5 MILLIGRAM(S): 2 INJECTION INTRAMUSCULAR; INTRAVENOUS; SUBCUTANEOUS at 07:05

## 2017-03-14 RX ADMIN — SIMETHICONE 80 MILLIGRAM(S): 80 TABLET, CHEWABLE ORAL at 07:18

## 2017-03-14 RX ADMIN — HYDROMORPHONE HYDROCHLORIDE 0.5 MILLIGRAM(S): 2 INJECTION INTRAMUSCULAR; INTRAVENOUS; SUBCUTANEOUS at 06:45

## 2017-03-14 RX ADMIN — HYDROMORPHONE HYDROCHLORIDE 0.5 MILLIGRAM(S): 2 INJECTION INTRAMUSCULAR; INTRAVENOUS; SUBCUTANEOUS at 15:14

## 2017-03-14 RX ADMIN — SIMETHICONE 80 MILLIGRAM(S): 80 TABLET, CHEWABLE ORAL at 15:15

## 2017-03-15 DIAGNOSIS — E83.39 OTHER DISORDERS OF PHOSPHORUS METABOLISM: ICD-10-CM

## 2017-03-15 LAB
ALBUMIN SERPL ELPH-MCNC: 2.9 G/DL — LOW (ref 3.3–5)
ALP SERPL-CCNC: 109 U/L — SIGNIFICANT CHANGE UP (ref 40–120)
ALT FLD-CCNC: 129 U/L — HIGH (ref 12–78)
AMYLASE P1 CFR SERPL: 83 U/L — SIGNIFICANT CHANGE UP (ref 25–115)
ANION GAP SERPL CALC-SCNC: 9 MMOL/L — SIGNIFICANT CHANGE UP (ref 5–17)
AST SERPL-CCNC: 95 U/L — HIGH (ref 15–37)
BILIRUB SERPL-MCNC: 0.5 MG/DL — SIGNIFICANT CHANGE UP (ref 0.2–1.2)
BUN SERPL-MCNC: 6 MG/DL — LOW (ref 7–23)
CALCIUM SERPL-MCNC: 8.8 MG/DL — SIGNIFICANT CHANGE UP (ref 8.5–10.1)
CHLORIDE SERPL-SCNC: 104 MMOL/L — SIGNIFICANT CHANGE UP (ref 96–108)
CO2 SERPL-SCNC: 27 MMOL/L — SIGNIFICANT CHANGE UP (ref 22–31)
CREAT SERPL-MCNC: 0.98 MG/DL — SIGNIFICANT CHANGE UP (ref 0.5–1.3)
GLUCOSE SERPL-MCNC: 144 MG/DL — HIGH (ref 70–99)
HCT VFR BLD CALC: 44.5 % — SIGNIFICANT CHANGE UP (ref 39–50)
HGB BLD-MCNC: 14.6 G/DL — SIGNIFICANT CHANGE UP (ref 13–17)
IGG SERPL-MCNC: 1040 MG/DL — SIGNIFICANT CHANGE UP (ref 767–1590)
IGG1 SER-MCNC: 538 MG/DL — SIGNIFICANT CHANGE UP (ref 341–894)
IGG2 SER-MCNC: 293 MG/DL — SIGNIFICANT CHANGE UP (ref 171–632)
IGG3 SER-MCNC: 31.3 MG/DL — SIGNIFICANT CHANGE UP (ref 18.4–106)
IGG4 SER-MCNC: 59.1 MG/DL — SIGNIFICANT CHANGE UP (ref 2.4–121)
LIDOCAIN IGE QN: 379 U/L — SIGNIFICANT CHANGE UP (ref 73–393)
MAGNESIUM SERPL-MCNC: 1.7 MG/DL — LOW (ref 1.8–2.4)
MCHC RBC-ENTMCNC: 29.6 PG — SIGNIFICANT CHANGE UP (ref 27–34)
MCHC RBC-ENTMCNC: 32.8 GM/DL — SIGNIFICANT CHANGE UP (ref 32–36)
MCV RBC AUTO: 90.1 FL — SIGNIFICANT CHANGE UP (ref 80–100)
PHOSPHATE SERPL-MCNC: 2.2 MG/DL — LOW (ref 2.5–4.5)
PLATELET # BLD AUTO: 220 K/UL — SIGNIFICANT CHANGE UP (ref 150–400)
POTASSIUM SERPL-MCNC: 3.7 MMOL/L — SIGNIFICANT CHANGE UP (ref 3.5–5.3)
POTASSIUM SERPL-SCNC: 3.7 MMOL/L — SIGNIFICANT CHANGE UP (ref 3.5–5.3)
PROT SERPL-MCNC: 7.1 GM/DL — SIGNIFICANT CHANGE UP (ref 6–8.3)
RBC # BLD: 4.94 M/UL — SIGNIFICANT CHANGE UP (ref 4.2–5.8)
RBC # FLD: 12.2 % — SIGNIFICANT CHANGE UP (ref 10.3–14.5)
SODIUM SERPL-SCNC: 140 MMOL/L — SIGNIFICANT CHANGE UP (ref 135–145)
WBC # BLD: 7.1 K/UL — SIGNIFICANT CHANGE UP (ref 3.8–10.5)
WBC # FLD AUTO: 7.1 K/UL — SIGNIFICANT CHANGE UP (ref 3.8–10.5)

## 2017-03-15 PROCEDURE — 74183 MRI ABD W/O CNTR FLWD CNTR: CPT | Mod: 26

## 2017-03-15 RX ORDER — SODIUM,POTASSIUM PHOSPHATES 278-250MG
1 POWDER IN PACKET (EA) ORAL
Qty: 0 | Refills: 0 | Status: COMPLETED | OUTPATIENT
Start: 2017-03-15 | End: 2017-03-15

## 2017-03-15 RX ORDER — MAGNESIUM SULFATE 500 MG/ML
1 VIAL (ML) INJECTION ONCE
Qty: 0 | Refills: 0 | Status: COMPLETED | OUTPATIENT
Start: 2017-03-15 | End: 2017-03-15

## 2017-03-15 RX ADMIN — POLYETHYLENE GLYCOL 3350 17 GRAM(S): 17 POWDER, FOR SOLUTION ORAL at 09:38

## 2017-03-15 RX ADMIN — Medication 2: at 11:43

## 2017-03-15 RX ADMIN — Medication 1 TABLET(S): at 22:34

## 2017-03-15 RX ADMIN — SODIUM CHLORIDE 125 MILLILITER(S): 9 INJECTION INTRAMUSCULAR; INTRAVENOUS; SUBCUTANEOUS at 21:08

## 2017-03-15 RX ADMIN — Medication 100 MILLIGRAM(S): at 21:09

## 2017-03-15 RX ADMIN — Medication 1 TABLET(S): at 14:32

## 2017-03-15 RX ADMIN — PANTOPRAZOLE SODIUM 40 MILLIGRAM(S): 20 TABLET, DELAYED RELEASE ORAL at 11:46

## 2017-03-15 RX ADMIN — Medication 100 GRAM(S): at 10:31

## 2017-03-15 RX ADMIN — AMLODIPINE BESYLATE 5 MILLIGRAM(S): 2.5 TABLET ORAL at 05:53

## 2017-03-15 RX ADMIN — POLYETHYLENE GLYCOL 3350 17 GRAM(S): 17 POWDER, FOR SOLUTION ORAL at 21:12

## 2017-03-15 RX ADMIN — SENNA PLUS 2 TABLET(S): 8.6 TABLET ORAL at 21:09

## 2017-03-15 RX ADMIN — HYDROMORPHONE HYDROCHLORIDE 0.5 MILLIGRAM(S): 2 INJECTION INTRAMUSCULAR; INTRAVENOUS; SUBCUTANEOUS at 03:36

## 2017-03-15 RX ADMIN — HYDROMORPHONE HYDROCHLORIDE 0.5 MILLIGRAM(S): 2 INJECTION INTRAMUSCULAR; INTRAVENOUS; SUBCUTANEOUS at 21:09

## 2017-03-15 RX ADMIN — MAGNESIUM OXIDE 400 MG ORAL TABLET 400 MILLIGRAM(S): 241.3 TABLET ORAL at 08:40

## 2017-03-15 RX ADMIN — MAGNESIUM OXIDE 400 MG ORAL TABLET 400 MILLIGRAM(S): 241.3 TABLET ORAL at 12:39

## 2017-03-15 RX ADMIN — HYDROMORPHONE HYDROCHLORIDE 0.5 MILLIGRAM(S): 2 INJECTION INTRAMUSCULAR; INTRAVENOUS; SUBCUTANEOUS at 03:06

## 2017-03-15 RX ADMIN — SODIUM CHLORIDE 125 MILLILITER(S): 9 INJECTION INTRAMUSCULAR; INTRAVENOUS; SUBCUTANEOUS at 11:44

## 2017-03-15 RX ADMIN — HYDROMORPHONE HYDROCHLORIDE 0.5 MILLIGRAM(S): 2 INJECTION INTRAMUSCULAR; INTRAVENOUS; SUBCUTANEOUS at 21:39

## 2017-03-15 RX ADMIN — HYDROMORPHONE HYDROCHLORIDE 0.5 MILLIGRAM(S): 2 INJECTION INTRAMUSCULAR; INTRAVENOUS; SUBCUTANEOUS at 16:23

## 2017-03-15 RX ADMIN — HYDROMORPHONE HYDROCHLORIDE 0.5 MILLIGRAM(S): 2 INJECTION INTRAMUSCULAR; INTRAVENOUS; SUBCUTANEOUS at 15:03

## 2017-03-15 RX ADMIN — SODIUM CHLORIDE 125 MILLILITER(S): 9 INJECTION INTRAMUSCULAR; INTRAVENOUS; SUBCUTANEOUS at 03:00

## 2017-03-15 RX ADMIN — SIMETHICONE 80 MILLIGRAM(S): 80 TABLET, CHEWABLE ORAL at 05:56

## 2017-03-15 RX ADMIN — Medication 1 TABLET(S): at 17:12

## 2017-03-15 RX ADMIN — SIMETHICONE 80 MILLIGRAM(S): 80 TABLET, CHEWABLE ORAL at 11:47

## 2017-03-15 RX ADMIN — Medication 1: at 17:12

## 2017-03-15 NOTE — PROGRESS NOTE ADULT - PROBLEM SELECTOR PROBLEM 1
Idiopathic acute pancreatitis without infection or necrosis
Idiopathic acute pancreatitis without infection or necrosis

## 2017-03-15 NOTE — PROGRESS NOTE ADULT - PROBLEM SELECTOR PROBLEM 2
Type 2 diabetes mellitus without complication, with long-term current use of insulin
Type 2 diabetes mellitus without complication, with long-term current use of insulin

## 2017-03-15 NOTE — PROGRESS NOTE ADULT - PROBLEM SELECTOR PLAN 2
c/w Accu checks, change to QAC/ HS  cover with ISS  HB A1c >11!
c/w Accu checks, change to QAC/ HS  cover with ISS  HB A1c >11!

## 2017-03-15 NOTE — PROGRESS NOTE ADULT - PROBLEM SELECTOR PLAN 1
- tolerates  clears   -C/w  IVF  - control pain   - Simethicone PRN  - Bowel regimen   - Lipase/amylase  back to normal level, but LFTs went up   - Lipids normal   - F/u pending  labs  - Pt had EUS, has some pancreatic parenchyma changes, but does not meet criteria for chronic pancreatitis, possible cause ACEI as per GI, recommended to discontinue.   - D/w DR. Louis this am ,  Will order MRCP, advance diet depending on results
- advance diet to clears   -C/w  IVF  - control pain   - Lipase/amylase  back to normal level   - Lipids normal   - F/u pending  labs  - D/w DR. Louis, Pt had EUS, has some pancreatic parenchyma changes, but does not meet criteria for chronic pancreatitis, possible cause ACEI, recommended to discontinue

## 2017-03-15 NOTE — PROGRESS NOTE ADULT - PROBLEM SELECTOR PLAN 3
Off Lisinopril  BP not optimal, started on  amlodipine  -BOP still elevated, possibly due to pain, continue to monitor, if no improvement may titrate further
Off Lisinopril  BP not optimal, start amlodipine

## 2017-03-16 LAB
ANA TITR SER: NEGATIVE — SIGNIFICANT CHANGE UP
ANION GAP SERPL CALC-SCNC: 7 MMOL/L — SIGNIFICANT CHANGE UP (ref 5–17)
BUN SERPL-MCNC: 5 MG/DL — LOW (ref 7–23)
CALCIUM SERPL-MCNC: 8.4 MG/DL — LOW (ref 8.5–10.1)
CHLORIDE SERPL-SCNC: 105 MMOL/L — SIGNIFICANT CHANGE UP (ref 96–108)
CO2 SERPL-SCNC: 28 MMOL/L — SIGNIFICANT CHANGE UP (ref 22–31)
CREAT SERPL-MCNC: 1.03 MG/DL — SIGNIFICANT CHANGE UP (ref 0.5–1.3)
GLUCOSE SERPL-MCNC: 175 MG/DL — HIGH (ref 70–99)
HCT VFR BLD CALC: 41.1 % — SIGNIFICANT CHANGE UP (ref 39–50)
HGB BLD-MCNC: 13.6 G/DL — SIGNIFICANT CHANGE UP (ref 13–17)
MAGNESIUM SERPL-MCNC: 1.9 MG/DL — SIGNIFICANT CHANGE UP (ref 1.8–2.4)
MCHC RBC-ENTMCNC: 29.5 PG — SIGNIFICANT CHANGE UP (ref 27–34)
MCHC RBC-ENTMCNC: 33.1 GM/DL — SIGNIFICANT CHANGE UP (ref 32–36)
MCV RBC AUTO: 89 FL — SIGNIFICANT CHANGE UP (ref 80–100)
MITOCHONDRIA AB SER-ACNC: SIGNIFICANT CHANGE UP
PHOSPHATE SERPL-MCNC: 3 MG/DL — SIGNIFICANT CHANGE UP (ref 2.5–4.5)
PLATELET # BLD AUTO: 231 K/UL — SIGNIFICANT CHANGE UP (ref 150–400)
POTASSIUM SERPL-MCNC: 4 MMOL/L — SIGNIFICANT CHANGE UP (ref 3.5–5.3)
POTASSIUM SERPL-SCNC: 4 MMOL/L — SIGNIFICANT CHANGE UP (ref 3.5–5.3)
RBC # BLD: 4.62 M/UL — SIGNIFICANT CHANGE UP (ref 4.2–5.8)
RBC # FLD: 11.9 % — SIGNIFICANT CHANGE UP (ref 10.3–14.5)
SMOOTH MUSCLE AB SER-ACNC: SIGNIFICANT CHANGE UP
SODIUM SERPL-SCNC: 140 MMOL/L — SIGNIFICANT CHANGE UP (ref 135–145)
WBC # BLD: 4 K/UL — SIGNIFICANT CHANGE UP (ref 3.8–10.5)
WBC # FLD AUTO: 4 K/UL — SIGNIFICANT CHANGE UP (ref 3.8–10.5)

## 2017-03-16 RX ADMIN — HYDROMORPHONE HYDROCHLORIDE 0.5 MILLIGRAM(S): 2 INJECTION INTRAMUSCULAR; INTRAVENOUS; SUBCUTANEOUS at 15:04

## 2017-03-16 RX ADMIN — HYDROMORPHONE HYDROCHLORIDE 0.5 MILLIGRAM(S): 2 INJECTION INTRAMUSCULAR; INTRAVENOUS; SUBCUTANEOUS at 23:53

## 2017-03-16 RX ADMIN — SODIUM CHLORIDE 125 MILLILITER(S): 9 INJECTION INTRAMUSCULAR; INTRAVENOUS; SUBCUTANEOUS at 05:30

## 2017-03-16 RX ADMIN — Medication 1: at 17:39

## 2017-03-16 RX ADMIN — HYDROMORPHONE HYDROCHLORIDE 0.5 MILLIGRAM(S): 2 INJECTION INTRAMUSCULAR; INTRAVENOUS; SUBCUTANEOUS at 15:09

## 2017-03-16 RX ADMIN — SODIUM CHLORIDE 125 MILLILITER(S): 9 INJECTION INTRAMUSCULAR; INTRAVENOUS; SUBCUTANEOUS at 20:22

## 2017-03-16 RX ADMIN — HYDROMORPHONE HYDROCHLORIDE 0.5 MILLIGRAM(S): 2 INJECTION INTRAMUSCULAR; INTRAVENOUS; SUBCUTANEOUS at 17:58

## 2017-03-16 RX ADMIN — SODIUM CHLORIDE 125 MILLILITER(S): 9 INJECTION INTRAMUSCULAR; INTRAVENOUS; SUBCUTANEOUS at 12:15

## 2017-03-16 RX ADMIN — HYDROMORPHONE HYDROCHLORIDE 0.5 MILLIGRAM(S): 2 INJECTION INTRAMUSCULAR; INTRAVENOUS; SUBCUTANEOUS at 11:23

## 2017-03-16 RX ADMIN — HYDROMORPHONE HYDROCHLORIDE 0.5 MILLIGRAM(S): 2 INJECTION INTRAMUSCULAR; INTRAVENOUS; SUBCUTANEOUS at 10:44

## 2017-03-16 RX ADMIN — PANTOPRAZOLE SODIUM 40 MILLIGRAM(S): 20 TABLET, DELAYED RELEASE ORAL at 12:15

## 2017-03-16 RX ADMIN — HYDROMORPHONE HYDROCHLORIDE 0.5 MILLIGRAM(S): 2 INJECTION INTRAMUSCULAR; INTRAVENOUS; SUBCUTANEOUS at 02:25

## 2017-03-16 RX ADMIN — AMLODIPINE BESYLATE 5 MILLIGRAM(S): 2.5 TABLET ORAL at 05:28

## 2017-03-16 RX ADMIN — Medication 1: at 08:33

## 2017-03-16 RX ADMIN — Medication 1: at 12:15

## 2017-03-17 LAB — LKM AB SER-ACNC: 0.5 UNITS — SIGNIFICANT CHANGE UP (ref 0–20)

## 2017-03-17 RX ADMIN — SODIUM CHLORIDE 125 MILLILITER(S): 9 INJECTION INTRAMUSCULAR; INTRAVENOUS; SUBCUTANEOUS at 12:08

## 2017-03-17 RX ADMIN — Medication 2: at 17:51

## 2017-03-17 RX ADMIN — HYDROMORPHONE HYDROCHLORIDE 0.5 MILLIGRAM(S): 2 INJECTION INTRAMUSCULAR; INTRAVENOUS; SUBCUTANEOUS at 13:33

## 2017-03-17 RX ADMIN — Medication 2: at 08:08

## 2017-03-17 RX ADMIN — Medication 100 MILLIGRAM(S): at 12:09

## 2017-03-17 RX ADMIN — HYDROMORPHONE HYDROCHLORIDE 0.5 MILLIGRAM(S): 2 INJECTION INTRAMUSCULAR; INTRAVENOUS; SUBCUTANEOUS at 12:55

## 2017-03-17 RX ADMIN — AMLODIPINE BESYLATE 5 MILLIGRAM(S): 2.5 TABLET ORAL at 05:29

## 2017-03-17 RX ADMIN — SODIUM CHLORIDE 125 MILLILITER(S): 9 INJECTION INTRAMUSCULAR; INTRAVENOUS; SUBCUTANEOUS at 05:29

## 2017-03-17 RX ADMIN — HYDROMORPHONE HYDROCHLORIDE 0.5 MILLIGRAM(S): 2 INJECTION INTRAMUSCULAR; INTRAVENOUS; SUBCUTANEOUS at 21:07

## 2017-03-17 RX ADMIN — PANTOPRAZOLE SODIUM 40 MILLIGRAM(S): 20 TABLET, DELAYED RELEASE ORAL at 12:07

## 2017-03-17 RX ADMIN — HYDROMORPHONE HYDROCHLORIDE 0.5 MILLIGRAM(S): 2 INJECTION INTRAMUSCULAR; INTRAVENOUS; SUBCUTANEOUS at 00:23

## 2017-03-17 RX ADMIN — Medication 1: at 12:09

## 2017-03-17 RX ADMIN — SODIUM CHLORIDE 125 MILLILITER(S): 9 INJECTION INTRAMUSCULAR; INTRAVENOUS; SUBCUTANEOUS at 20:42

## 2017-03-17 RX ADMIN — POLYETHYLENE GLYCOL 3350 17 GRAM(S): 17 POWDER, FOR SOLUTION ORAL at 12:06

## 2017-03-18 LAB
ANION GAP SERPL CALC-SCNC: 9 MMOL/L — SIGNIFICANT CHANGE UP (ref 5–17)
BUN SERPL-MCNC: 4 MG/DL — LOW (ref 7–23)
CALCIUM SERPL-MCNC: 8.8 MG/DL — SIGNIFICANT CHANGE UP (ref 8.5–10.1)
CHLORIDE SERPL-SCNC: 105 MMOL/L — SIGNIFICANT CHANGE UP (ref 96–108)
CO2 SERPL-SCNC: 26 MMOL/L — SIGNIFICANT CHANGE UP (ref 22–31)
CREAT SERPL-MCNC: 0.96 MG/DL — SIGNIFICANT CHANGE UP (ref 0.5–1.3)
GLUCOSE SERPL-MCNC: 184 MG/DL — HIGH (ref 70–99)
HCT VFR BLD CALC: 41.2 % — SIGNIFICANT CHANGE UP (ref 39–50)
HGB BLD-MCNC: 13.6 G/DL — SIGNIFICANT CHANGE UP (ref 13–17)
MCHC RBC-ENTMCNC: 29.4 PG — SIGNIFICANT CHANGE UP (ref 27–34)
MCHC RBC-ENTMCNC: 32.9 GM/DL — SIGNIFICANT CHANGE UP (ref 32–36)
MCV RBC AUTO: 89.3 FL — SIGNIFICANT CHANGE UP (ref 80–100)
PLATELET # BLD AUTO: 207 K/UL — SIGNIFICANT CHANGE UP (ref 150–400)
POTASSIUM SERPL-MCNC: 3.9 MMOL/L — SIGNIFICANT CHANGE UP (ref 3.5–5.3)
POTASSIUM SERPL-SCNC: 3.9 MMOL/L — SIGNIFICANT CHANGE UP (ref 3.5–5.3)
RBC # BLD: 4.62 M/UL — SIGNIFICANT CHANGE UP (ref 4.2–5.8)
RBC # FLD: 11.9 % — SIGNIFICANT CHANGE UP (ref 10.3–14.5)
SODIUM SERPL-SCNC: 140 MMOL/L — SIGNIFICANT CHANGE UP (ref 135–145)
WBC # BLD: 5.5 K/UL — SIGNIFICANT CHANGE UP (ref 3.8–10.5)
WBC # FLD AUTO: 5.5 K/UL — SIGNIFICANT CHANGE UP (ref 3.8–10.5)

## 2017-03-18 RX ADMIN — HYDROMORPHONE HYDROCHLORIDE 0.5 MILLIGRAM(S): 2 INJECTION INTRAMUSCULAR; INTRAVENOUS; SUBCUTANEOUS at 22:45

## 2017-03-18 RX ADMIN — SODIUM CHLORIDE 125 MILLILITER(S): 9 INJECTION INTRAMUSCULAR; INTRAVENOUS; SUBCUTANEOUS at 21:26

## 2017-03-18 RX ADMIN — Medication 1: at 13:30

## 2017-03-18 RX ADMIN — HYDROMORPHONE HYDROCHLORIDE 0.5 MILLIGRAM(S): 2 INJECTION INTRAMUSCULAR; INTRAVENOUS; SUBCUTANEOUS at 18:46

## 2017-03-18 RX ADMIN — PANTOPRAZOLE SODIUM 40 MILLIGRAM(S): 20 TABLET, DELAYED RELEASE ORAL at 13:31

## 2017-03-18 RX ADMIN — SODIUM CHLORIDE 125 MILLILITER(S): 9 INJECTION INTRAMUSCULAR; INTRAVENOUS; SUBCUTANEOUS at 06:25

## 2017-03-18 RX ADMIN — AMLODIPINE BESYLATE 5 MILLIGRAM(S): 2.5 TABLET ORAL at 06:25

## 2017-03-18 RX ADMIN — Medication 2: at 18:16

## 2017-03-18 RX ADMIN — HYDROMORPHONE HYDROCHLORIDE 0.5 MILLIGRAM(S): 2 INJECTION INTRAMUSCULAR; INTRAVENOUS; SUBCUTANEOUS at 21:59

## 2017-03-19 RX ORDER — INSULIN GLARGINE 100 [IU]/ML
10 INJECTION, SOLUTION SUBCUTANEOUS AT BEDTIME
Qty: 0 | Refills: 0 | Status: DISCONTINUED | OUTPATIENT
Start: 2017-03-19 | End: 2017-03-23

## 2017-03-19 RX ORDER — SODIUM CHLORIDE 9 MG/ML
1000 INJECTION INTRAMUSCULAR; INTRAVENOUS; SUBCUTANEOUS
Qty: 0 | Refills: 0 | Status: DISCONTINUED | OUTPATIENT
Start: 2017-03-19 | End: 2017-03-21

## 2017-03-19 RX ADMIN — HYDROMORPHONE HYDROCHLORIDE 0.5 MILLIGRAM(S): 2 INJECTION INTRAMUSCULAR; INTRAVENOUS; SUBCUTANEOUS at 06:00

## 2017-03-19 RX ADMIN — HYDROMORPHONE HYDROCHLORIDE 0.5 MILLIGRAM(S): 2 INJECTION INTRAMUSCULAR; INTRAVENOUS; SUBCUTANEOUS at 14:07

## 2017-03-19 RX ADMIN — HYDROMORPHONE HYDROCHLORIDE 0.5 MILLIGRAM(S): 2 INJECTION INTRAMUSCULAR; INTRAVENOUS; SUBCUTANEOUS at 23:36

## 2017-03-19 RX ADMIN — HYDROMORPHONE HYDROCHLORIDE 0.5 MILLIGRAM(S): 2 INJECTION INTRAMUSCULAR; INTRAVENOUS; SUBCUTANEOUS at 05:30

## 2017-03-19 RX ADMIN — PANTOPRAZOLE SODIUM 40 MILLIGRAM(S): 20 TABLET, DELAYED RELEASE ORAL at 13:39

## 2017-03-19 RX ADMIN — SODIUM CHLORIDE 125 MILLILITER(S): 9 INJECTION INTRAMUSCULAR; INTRAVENOUS; SUBCUTANEOUS at 05:25

## 2017-03-19 RX ADMIN — SODIUM CHLORIDE 83 MILLILITER(S): 9 INJECTION INTRAMUSCULAR; INTRAVENOUS; SUBCUTANEOUS at 23:38

## 2017-03-19 RX ADMIN — Medication 2: at 09:25

## 2017-03-19 RX ADMIN — HYDROMORPHONE HYDROCHLORIDE 0.5 MILLIGRAM(S): 2 INJECTION INTRAMUSCULAR; INTRAVENOUS; SUBCUTANEOUS at 19:49

## 2017-03-19 RX ADMIN — Medication 3: at 13:38

## 2017-03-19 RX ADMIN — INSULIN GLARGINE 10 UNIT(S): 100 INJECTION, SOLUTION SUBCUTANEOUS at 21:11

## 2017-03-19 RX ADMIN — HYDROMORPHONE HYDROCHLORIDE 0.5 MILLIGRAM(S): 2 INJECTION INTRAMUSCULAR; INTRAVENOUS; SUBCUTANEOUS at 14:34

## 2017-03-19 RX ADMIN — AMLODIPINE BESYLATE 5 MILLIGRAM(S): 2.5 TABLET ORAL at 05:25

## 2017-03-19 RX ADMIN — Medication 1: at 17:58

## 2017-03-19 RX ADMIN — HYDROMORPHONE HYDROCHLORIDE 0.5 MILLIGRAM(S): 2 INJECTION INTRAMUSCULAR; INTRAVENOUS; SUBCUTANEOUS at 20:20

## 2017-03-19 RX ADMIN — SODIUM CHLORIDE 125 MILLILITER(S): 9 INJECTION INTRAMUSCULAR; INTRAVENOUS; SUBCUTANEOUS at 13:37

## 2017-03-20 LAB
ALBUMIN SERPL ELPH-MCNC: 3 G/DL — LOW (ref 3.3–5)
ALP SERPL-CCNC: 169 U/L — HIGH (ref 40–120)
ALT FLD-CCNC: 275 U/L — HIGH (ref 12–78)
AMYLASE P1 CFR SERPL: 68 U/L — SIGNIFICANT CHANGE UP (ref 25–115)
ANION GAP SERPL CALC-SCNC: 8 MMOL/L — SIGNIFICANT CHANGE UP (ref 5–17)
AST SERPL-CCNC: 41 U/L — HIGH (ref 15–37)
BILIRUB SERPL-MCNC: 0.3 MG/DL — SIGNIFICANT CHANGE UP (ref 0.2–1.2)
BUN SERPL-MCNC: 9 MG/DL — SIGNIFICANT CHANGE UP (ref 7–23)
CALCIUM SERPL-MCNC: 9.1 MG/DL — SIGNIFICANT CHANGE UP (ref 8.5–10.1)
CHLORIDE SERPL-SCNC: 102 MMOL/L — SIGNIFICANT CHANGE UP (ref 96–108)
CO2 SERPL-SCNC: 28 MMOL/L — SIGNIFICANT CHANGE UP (ref 22–31)
CREAT SERPL-MCNC: 0.9 MG/DL — SIGNIFICANT CHANGE UP (ref 0.5–1.3)
GLUCOSE SERPL-MCNC: 241 MG/DL — HIGH (ref 70–99)
LIDOCAIN IGE QN: 620 U/L — HIGH (ref 73–393)
POTASSIUM SERPL-MCNC: 4.2 MMOL/L — SIGNIFICANT CHANGE UP (ref 3.5–5.3)
POTASSIUM SERPL-SCNC: 4.2 MMOL/L — SIGNIFICANT CHANGE UP (ref 3.5–5.3)
PROT SERPL-MCNC: 7.3 GM/DL — SIGNIFICANT CHANGE UP (ref 6–8.3)
SODIUM SERPL-SCNC: 138 MMOL/L — SIGNIFICANT CHANGE UP (ref 135–145)

## 2017-03-20 RX ORDER — HYDROMORPHONE HYDROCHLORIDE 2 MG/ML
0.5 INJECTION INTRAMUSCULAR; INTRAVENOUS; SUBCUTANEOUS
Qty: 0 | Refills: 0 | Status: DISCONTINUED | OUTPATIENT
Start: 2017-03-20 | End: 2017-03-24

## 2017-03-20 RX ORDER — HYDROMORPHONE HYDROCHLORIDE 2 MG/ML
1 INJECTION INTRAMUSCULAR; INTRAVENOUS; SUBCUTANEOUS ONCE
Qty: 0 | Refills: 0 | Status: DISCONTINUED | OUTPATIENT
Start: 2017-03-20 | End: 2017-03-20

## 2017-03-20 RX ADMIN — HYDROMORPHONE HYDROCHLORIDE 0.5 MILLIGRAM(S): 2 INJECTION INTRAMUSCULAR; INTRAVENOUS; SUBCUTANEOUS at 21:48

## 2017-03-20 RX ADMIN — PANTOPRAZOLE SODIUM 40 MILLIGRAM(S): 20 TABLET, DELAYED RELEASE ORAL at 12:40

## 2017-03-20 RX ADMIN — SODIUM CHLORIDE 83 MILLILITER(S): 9 INJECTION INTRAMUSCULAR; INTRAVENOUS; SUBCUTANEOUS at 12:38

## 2017-03-20 RX ADMIN — HYDROMORPHONE HYDROCHLORIDE 0.5 MILLIGRAM(S): 2 INJECTION INTRAMUSCULAR; INTRAVENOUS; SUBCUTANEOUS at 17:49

## 2017-03-20 RX ADMIN — HYDROMORPHONE HYDROCHLORIDE 0.5 MILLIGRAM(S): 2 INJECTION INTRAMUSCULAR; INTRAVENOUS; SUBCUTANEOUS at 14:00

## 2017-03-20 RX ADMIN — Medication 2: at 12:34

## 2017-03-20 RX ADMIN — HYDROMORPHONE HYDROCHLORIDE 0.5 MILLIGRAM(S): 2 INJECTION INTRAMUSCULAR; INTRAVENOUS; SUBCUTANEOUS at 18:01

## 2017-03-20 RX ADMIN — Medication 2: at 08:50

## 2017-03-20 RX ADMIN — POLYETHYLENE GLYCOL 3350 17 GRAM(S): 17 POWDER, FOR SOLUTION ORAL at 14:01

## 2017-03-20 RX ADMIN — Medication 2: at 17:44

## 2017-03-20 RX ADMIN — AMLODIPINE BESYLATE 5 MILLIGRAM(S): 2.5 TABLET ORAL at 05:07

## 2017-03-20 RX ADMIN — INSULIN GLARGINE 10 UNIT(S): 100 INJECTION, SOLUTION SUBCUTANEOUS at 21:53

## 2017-03-20 RX ADMIN — HYDROMORPHONE HYDROCHLORIDE 0.5 MILLIGRAM(S): 2 INJECTION INTRAMUSCULAR; INTRAVENOUS; SUBCUTANEOUS at 20:47

## 2017-03-20 RX ADMIN — HYDROMORPHONE HYDROCHLORIDE 0.5 MILLIGRAM(S): 2 INJECTION INTRAMUSCULAR; INTRAVENOUS; SUBCUTANEOUS at 14:20

## 2017-03-21 LAB
ALBUMIN SERPL ELPH-MCNC: 3.2 G/DL — LOW (ref 3.3–5)
ALP SERPL-CCNC: 169 U/L — HIGH (ref 40–120)
ALT FLD-CCNC: 243 U/L — HIGH (ref 12–78)
ANION GAP SERPL CALC-SCNC: 9 MMOL/L — SIGNIFICANT CHANGE UP (ref 5–17)
AST SERPL-CCNC: 40 U/L — HIGH (ref 15–37)
BILIRUB DIRECT SERPL-MCNC: 0.2 MG/DL — SIGNIFICANT CHANGE UP (ref 0–0.2)
BILIRUB INDIRECT FLD-MCNC: 0.3 MG/DL — SIGNIFICANT CHANGE UP (ref 0.2–1)
BILIRUB SERPL-MCNC: 0.5 MG/DL — SIGNIFICANT CHANGE UP (ref 0.2–1.2)
BUN SERPL-MCNC: 8 MG/DL — SIGNIFICANT CHANGE UP (ref 7–23)
CALCIUM SERPL-MCNC: 9.1 MG/DL — SIGNIFICANT CHANGE UP (ref 8.5–10.1)
CHLORIDE SERPL-SCNC: 102 MMOL/L — SIGNIFICANT CHANGE UP (ref 96–108)
CO2 SERPL-SCNC: 28 MMOL/L — SIGNIFICANT CHANGE UP (ref 22–31)
CREAT SERPL-MCNC: 0.95 MG/DL — SIGNIFICANT CHANGE UP (ref 0.5–1.3)
GLUCOSE SERPL-MCNC: 194 MG/DL — HIGH (ref 70–99)
HCT VFR BLD CALC: 42.6 % — SIGNIFICANT CHANGE UP (ref 39–50)
HGB BLD-MCNC: 14 G/DL — SIGNIFICANT CHANGE UP (ref 13–17)
LIDOCAIN IGE QN: 510 U/L — HIGH (ref 73–393)
MCHC RBC-ENTMCNC: 29.2 PG — SIGNIFICANT CHANGE UP (ref 27–34)
MCHC RBC-ENTMCNC: 32.8 GM/DL — SIGNIFICANT CHANGE UP (ref 32–36)
MCV RBC AUTO: 89 FL — SIGNIFICANT CHANGE UP (ref 80–100)
PLATELET # BLD AUTO: 217 K/UL — SIGNIFICANT CHANGE UP (ref 150–400)
POTASSIUM SERPL-MCNC: 4.1 MMOL/L — SIGNIFICANT CHANGE UP (ref 3.5–5.3)
POTASSIUM SERPL-SCNC: 4.1 MMOL/L — SIGNIFICANT CHANGE UP (ref 3.5–5.3)
PROT SERPL-MCNC: 7.6 GM/DL — SIGNIFICANT CHANGE UP (ref 6–8.3)
RBC # BLD: 4.79 M/UL — SIGNIFICANT CHANGE UP (ref 4.2–5.8)
RBC # FLD: 11.7 % — SIGNIFICANT CHANGE UP (ref 10.3–14.5)
SODIUM SERPL-SCNC: 139 MMOL/L — SIGNIFICANT CHANGE UP (ref 135–145)
WBC # BLD: 6.7 K/UL — SIGNIFICANT CHANGE UP (ref 3.8–10.5)
WBC # FLD AUTO: 6.7 K/UL — SIGNIFICANT CHANGE UP (ref 3.8–10.5)

## 2017-03-21 PROCEDURE — 74177 CT ABD & PELVIS W/CONTRAST: CPT | Mod: 26

## 2017-03-21 RX ORDER — HYDROMORPHONE HYDROCHLORIDE 2 MG/ML
0.5 INJECTION INTRAMUSCULAR; INTRAVENOUS; SUBCUTANEOUS ONCE
Qty: 0 | Refills: 0 | Status: DISCONTINUED | OUTPATIENT
Start: 2017-03-21 | End: 2017-03-21

## 2017-03-21 RX ORDER — SODIUM CHLORIDE 9 MG/ML
1000 INJECTION INTRAMUSCULAR; INTRAVENOUS; SUBCUTANEOUS
Qty: 0 | Refills: 0 | Status: DISCONTINUED | OUTPATIENT
Start: 2017-03-21 | End: 2017-03-23

## 2017-03-21 RX ADMIN — SODIUM CHLORIDE 83 MILLILITER(S): 9 INJECTION INTRAMUSCULAR; INTRAVENOUS; SUBCUTANEOUS at 00:38

## 2017-03-21 RX ADMIN — PANTOPRAZOLE SODIUM 40 MILLIGRAM(S): 20 TABLET, DELAYED RELEASE ORAL at 11:34

## 2017-03-21 RX ADMIN — INSULIN GLARGINE 10 UNIT(S): 100 INJECTION, SOLUTION SUBCUTANEOUS at 22:32

## 2017-03-21 RX ADMIN — Medication 1: at 17:11

## 2017-03-21 RX ADMIN — SODIUM CHLORIDE 125 MILLILITER(S): 9 INJECTION INTRAMUSCULAR; INTRAVENOUS; SUBCUTANEOUS at 17:12

## 2017-03-21 RX ADMIN — Medication 1: at 09:06

## 2017-03-21 RX ADMIN — AMLODIPINE BESYLATE 5 MILLIGRAM(S): 2.5 TABLET ORAL at 06:04

## 2017-03-21 RX ADMIN — SENNA PLUS 2 TABLET(S): 8.6 TABLET ORAL at 11:34

## 2017-03-21 RX ADMIN — HYDROMORPHONE HYDROCHLORIDE 0.5 MILLIGRAM(S): 2 INJECTION INTRAMUSCULAR; INTRAVENOUS; SUBCUTANEOUS at 22:40

## 2017-03-21 RX ADMIN — HYDROMORPHONE HYDROCHLORIDE 0.5 MILLIGRAM(S): 2 INJECTION INTRAMUSCULAR; INTRAVENOUS; SUBCUTANEOUS at 23:08

## 2017-03-21 RX ADMIN — Medication 3: at 11:34

## 2017-03-21 RX ADMIN — SODIUM CHLORIDE 83 MILLILITER(S): 9 INJECTION INTRAMUSCULAR; INTRAVENOUS; SUBCUTANEOUS at 11:36

## 2017-03-22 LAB
ALBUMIN SERPL ELPH-MCNC: 3 G/DL — LOW (ref 3.3–5)
ALP SERPL-CCNC: 149 U/L — HIGH (ref 40–120)
ALT FLD-CCNC: 177 U/L — HIGH (ref 12–78)
ANION GAP SERPL CALC-SCNC: 8 MMOL/L — SIGNIFICANT CHANGE UP (ref 5–17)
AST SERPL-CCNC: 27 U/L — SIGNIFICANT CHANGE UP (ref 15–37)
BILIRUB SERPL-MCNC: 0.4 MG/DL — SIGNIFICANT CHANGE UP (ref 0.2–1.2)
BUN SERPL-MCNC: 7 MG/DL — SIGNIFICANT CHANGE UP (ref 7–23)
CALCIUM SERPL-MCNC: 9.1 MG/DL — SIGNIFICANT CHANGE UP (ref 8.5–10.1)
CHLORIDE SERPL-SCNC: 104 MMOL/L — SIGNIFICANT CHANGE UP (ref 96–108)
CO2 SERPL-SCNC: 27 MMOL/L — SIGNIFICANT CHANGE UP (ref 22–31)
CREAT SERPL-MCNC: 0.95 MG/DL — SIGNIFICANT CHANGE UP (ref 0.5–1.3)
GLUCOSE SERPL-MCNC: 190 MG/DL — HIGH (ref 70–99)
LIDOCAIN IGE QN: 424 U/L — HIGH (ref 73–393)
POTASSIUM SERPL-MCNC: 4.2 MMOL/L — SIGNIFICANT CHANGE UP (ref 3.5–5.3)
POTASSIUM SERPL-SCNC: 4.2 MMOL/L — SIGNIFICANT CHANGE UP (ref 3.5–5.3)
PROT SERPL-MCNC: 7.3 GM/DL — SIGNIFICANT CHANGE UP (ref 6–8.3)
SODIUM SERPL-SCNC: 139 MMOL/L — SIGNIFICANT CHANGE UP (ref 135–145)

## 2017-03-22 RX ORDER — HYDROMORPHONE HYDROCHLORIDE 2 MG/ML
1 INJECTION INTRAMUSCULAR; INTRAVENOUS; SUBCUTANEOUS EVERY 4 HOURS
Qty: 0 | Refills: 0 | Status: DISCONTINUED | OUTPATIENT
Start: 2017-03-22 | End: 2017-03-24

## 2017-03-22 RX ADMIN — SODIUM CHLORIDE 125 MILLILITER(S): 9 INJECTION INTRAMUSCULAR; INTRAVENOUS; SUBCUTANEOUS at 18:58

## 2017-03-22 RX ADMIN — PANTOPRAZOLE SODIUM 40 MILLIGRAM(S): 20 TABLET, DELAYED RELEASE ORAL at 11:06

## 2017-03-22 RX ADMIN — Medication 3: at 17:50

## 2017-03-22 RX ADMIN — SODIUM CHLORIDE 125 MILLILITER(S): 9 INJECTION INTRAMUSCULAR; INTRAVENOUS; SUBCUTANEOUS at 12:42

## 2017-03-22 RX ADMIN — AMLODIPINE BESYLATE 5 MILLIGRAM(S): 2.5 TABLET ORAL at 05:34

## 2017-03-22 RX ADMIN — Medication: at 08:36

## 2017-03-22 RX ADMIN — Medication 3: at 12:15

## 2017-03-22 RX ADMIN — SIMETHICONE 80 MILLIGRAM(S): 80 TABLET, CHEWABLE ORAL at 22:05

## 2017-03-23 LAB
ALBUMIN SERPL ELPH-MCNC: 2.8 G/DL — LOW (ref 3.3–5)
ALP SERPL-CCNC: 129 U/L — HIGH (ref 40–120)
ALT FLD-CCNC: 131 U/L — HIGH (ref 12–78)
ANION GAP SERPL CALC-SCNC: 10 MMOL/L — SIGNIFICANT CHANGE UP (ref 5–17)
AST SERPL-CCNC: 22 U/L — SIGNIFICANT CHANGE UP (ref 15–37)
BILIRUB SERPL-MCNC: 0.5 MG/DL — SIGNIFICANT CHANGE UP (ref 0.2–1.2)
BUN SERPL-MCNC: 4 MG/DL — LOW (ref 7–23)
CALCIUM SERPL-MCNC: 9.1 MG/DL — SIGNIFICANT CHANGE UP (ref 8.5–10.1)
CHLORIDE SERPL-SCNC: 107 MMOL/L — SIGNIFICANT CHANGE UP (ref 96–108)
CO2 SERPL-SCNC: 25 MMOL/L — SIGNIFICANT CHANGE UP (ref 22–31)
CREAT SERPL-MCNC: 0.91 MG/DL — SIGNIFICANT CHANGE UP (ref 0.5–1.3)
GLUCOSE SERPL-MCNC: 179 MG/DL — HIGH (ref 70–99)
LIDOCAIN IGE QN: 229 U/L — SIGNIFICANT CHANGE UP (ref 73–393)
MAGNESIUM SERPL-MCNC: 1.7 MG/DL — LOW (ref 1.8–2.4)
PHOSPHATE SERPL-MCNC: 3 MG/DL — SIGNIFICANT CHANGE UP (ref 2.5–4.5)
POTASSIUM SERPL-MCNC: 4 MMOL/L — SIGNIFICANT CHANGE UP (ref 3.5–5.3)
POTASSIUM SERPL-SCNC: 4 MMOL/L — SIGNIFICANT CHANGE UP (ref 3.5–5.3)
PROT SERPL-MCNC: 6.6 GM/DL — SIGNIFICANT CHANGE UP (ref 6–8.3)
SODIUM SERPL-SCNC: 142 MMOL/L — SIGNIFICANT CHANGE UP (ref 135–145)

## 2017-03-23 RX ORDER — GLUCAGON INJECTION, SOLUTION 0.5 MG/.1ML
1 INJECTION, SOLUTION SUBCUTANEOUS ONCE
Qty: 0 | Refills: 0 | Status: DISCONTINUED | OUTPATIENT
Start: 2017-03-23 | End: 2017-03-24

## 2017-03-23 RX ORDER — DEXTROSE 50 % IN WATER 50 %
25 SYRINGE (ML) INTRAVENOUS ONCE
Qty: 0 | Refills: 0 | Status: DISCONTINUED | OUTPATIENT
Start: 2017-03-23 | End: 2017-03-24

## 2017-03-23 RX ORDER — DEXTROSE 50 % IN WATER 50 %
12.5 SYRINGE (ML) INTRAVENOUS ONCE
Qty: 0 | Refills: 0 | Status: DISCONTINUED | OUTPATIENT
Start: 2017-03-23 | End: 2017-03-24

## 2017-03-23 RX ORDER — INSULIN LISPRO 100/ML
VIAL (ML) SUBCUTANEOUS AT BEDTIME
Qty: 0 | Refills: 0 | Status: DISCONTINUED | OUTPATIENT
Start: 2017-03-23 | End: 2017-03-24

## 2017-03-23 RX ORDER — DEXTROSE 50 % IN WATER 50 %
1 SYRINGE (ML) INTRAVENOUS ONCE
Qty: 0 | Refills: 0 | Status: DISCONTINUED | OUTPATIENT
Start: 2017-03-23 | End: 2017-03-24

## 2017-03-23 RX ORDER — SODIUM CHLORIDE 9 MG/ML
1000 INJECTION, SOLUTION INTRAVENOUS
Qty: 0 | Refills: 0 | Status: DISCONTINUED | OUTPATIENT
Start: 2017-03-23 | End: 2017-03-24

## 2017-03-23 RX ORDER — INSULIN GLARGINE 100 [IU]/ML
15 INJECTION, SOLUTION SUBCUTANEOUS AT BEDTIME
Qty: 0 | Refills: 0 | Status: DISCONTINUED | OUTPATIENT
Start: 2017-03-23 | End: 2017-03-24

## 2017-03-23 RX ORDER — INSULIN LISPRO 100/ML
VIAL (ML) SUBCUTANEOUS
Qty: 0 | Refills: 0 | Status: DISCONTINUED | OUTPATIENT
Start: 2017-03-23 | End: 2017-03-24

## 2017-03-23 RX ORDER — MAGNESIUM SULFATE 500 MG/ML
1 VIAL (ML) INJECTION ONCE
Qty: 0 | Refills: 0 | Status: COMPLETED | OUTPATIENT
Start: 2017-03-23 | End: 2017-03-23

## 2017-03-23 RX ADMIN — Medication 1: at 11:20

## 2017-03-23 RX ADMIN — SODIUM CHLORIDE 125 MILLILITER(S): 9 INJECTION INTRAMUSCULAR; INTRAVENOUS; SUBCUTANEOUS at 03:19

## 2017-03-23 RX ADMIN — INSULIN GLARGINE 15 UNIT(S): 100 INJECTION, SOLUTION SUBCUTANEOUS at 22:34

## 2017-03-23 RX ADMIN — AMLODIPINE BESYLATE 5 MILLIGRAM(S): 2.5 TABLET ORAL at 06:09

## 2017-03-23 RX ADMIN — Medication 1: at 08:20

## 2017-03-23 RX ADMIN — SODIUM CHLORIDE 125 MILLILITER(S): 9 INJECTION INTRAMUSCULAR; INTRAVENOUS; SUBCUTANEOUS at 11:15

## 2017-03-23 RX ADMIN — Medication 100 GRAM(S): at 13:40

## 2017-03-23 RX ADMIN — PANTOPRAZOLE SODIUM 40 MILLIGRAM(S): 20 TABLET, DELAYED RELEASE ORAL at 11:14

## 2017-03-23 NOTE — PROGRESS NOTE ADULT - VASCULAR
Equal and normal pulses (carotid, femoral, dorsalis pedis)

## 2017-03-24 ENCOUNTER — TRANSCRIPTION ENCOUNTER (OUTPATIENT)
Age: 67
End: 2017-03-24

## 2017-03-24 VITALS — DIASTOLIC BLOOD PRESSURE: 79 MMHG | HEART RATE: 65 BPM | SYSTOLIC BLOOD PRESSURE: 146 MMHG

## 2017-03-24 LAB
ALBUMIN SERPL ELPH-MCNC: 3.1 G/DL — LOW (ref 3.3–5)
ALP SERPL-CCNC: 132 U/L — HIGH (ref 40–120)
ALT FLD-CCNC: 109 U/L — HIGH (ref 12–78)
ANION GAP SERPL CALC-SCNC: 8 MMOL/L — SIGNIFICANT CHANGE UP (ref 5–17)
AST SERPL-CCNC: 13 U/L — LOW (ref 15–37)
BILIRUB SERPL-MCNC: 0.5 MG/DL — SIGNIFICANT CHANGE UP (ref 0.2–1.2)
BUN SERPL-MCNC: 8 MG/DL — SIGNIFICANT CHANGE UP (ref 7–23)
CALCIUM SERPL-MCNC: 9.6 MG/DL — SIGNIFICANT CHANGE UP (ref 8.5–10.1)
CHLORIDE SERPL-SCNC: 105 MMOL/L — SIGNIFICANT CHANGE UP (ref 96–108)
CO2 SERPL-SCNC: 27 MMOL/L — SIGNIFICANT CHANGE UP (ref 22–31)
CREAT SERPL-MCNC: 1.04 MG/DL — SIGNIFICANT CHANGE UP (ref 0.5–1.3)
GLUCOSE SERPL-MCNC: 175 MG/DL — HIGH (ref 70–99)
HCT VFR BLD CALC: 43.4 % — SIGNIFICANT CHANGE UP (ref 39–50)
HGB BLD-MCNC: 14.1 G/DL — SIGNIFICANT CHANGE UP (ref 13–17)
LIDOCAIN IGE QN: 207 U/L — SIGNIFICANT CHANGE UP (ref 73–393)
MAGNESIUM SERPL-MCNC: 2 MG/DL — SIGNIFICANT CHANGE UP (ref 1.8–2.4)
MCHC RBC-ENTMCNC: 29 PG — SIGNIFICANT CHANGE UP (ref 27–34)
MCHC RBC-ENTMCNC: 32.4 GM/DL — SIGNIFICANT CHANGE UP (ref 32–36)
MCV RBC AUTO: 89.4 FL — SIGNIFICANT CHANGE UP (ref 80–100)
PHOSPHATE SERPL-MCNC: 3.1 MG/DL — SIGNIFICANT CHANGE UP (ref 2.5–4.5)
PLATELET # BLD AUTO: 264 K/UL — SIGNIFICANT CHANGE UP (ref 150–400)
POTASSIUM SERPL-MCNC: 3.9 MMOL/L — SIGNIFICANT CHANGE UP (ref 3.5–5.3)
POTASSIUM SERPL-SCNC: 3.9 MMOL/L — SIGNIFICANT CHANGE UP (ref 3.5–5.3)
PROT SERPL-MCNC: 7.1 GM/DL — SIGNIFICANT CHANGE UP (ref 6–8.3)
RBC # BLD: 4.85 M/UL — SIGNIFICANT CHANGE UP (ref 4.2–5.8)
RBC # FLD: 12 % — SIGNIFICANT CHANGE UP (ref 10.3–14.5)
SODIUM SERPL-SCNC: 140 MMOL/L — SIGNIFICANT CHANGE UP (ref 135–145)
WBC # BLD: 6.1 K/UL — SIGNIFICANT CHANGE UP (ref 3.8–10.5)
WBC # FLD AUTO: 6.1 K/UL — SIGNIFICANT CHANGE UP (ref 3.8–10.5)

## 2017-03-24 RX ORDER — TRAMADOL HYDROCHLORIDE 50 MG/1
1 TABLET ORAL
Qty: 9 | Refills: 0
Start: 2017-03-24 | End: 2017-03-27

## 2017-03-24 RX ORDER — INSULIN LISPRO 100/ML
VIAL (ML) SUBCUTANEOUS AT BEDTIME
Qty: 0 | Refills: 0 | Status: DISCONTINUED | OUTPATIENT
Start: 2017-03-24 | End: 2017-03-24

## 2017-03-24 RX ORDER — DEXTROSE 50 % IN WATER 50 %
25 SYRINGE (ML) INTRAVENOUS ONCE
Qty: 0 | Refills: 0 | Status: DISCONTINUED | OUTPATIENT
Start: 2017-03-24 | End: 2017-03-24

## 2017-03-24 RX ORDER — QUINAPRIL HYDROCHLORIDE 40 MG/1
1 TABLET, FILM COATED ORAL
Qty: 0 | Refills: 0 | COMMUNITY

## 2017-03-24 RX ORDER — INSULIN GLARGINE 100 [IU]/ML
30 INJECTION, SOLUTION SUBCUTANEOUS
Qty: 0 | Refills: 0 | COMMUNITY

## 2017-03-24 RX ORDER — INSULIN LISPRO 100/ML
1 VIAL (ML) SUBCUTANEOUS
Qty: 1 | Refills: 0
Start: 2017-03-24 | End: 2017-04-23

## 2017-03-24 RX ORDER — INSULIN LISPRO 100/ML
8 VIAL (ML) SUBCUTANEOUS
Qty: 0 | Refills: 0 | DISCHARGE
Start: 2017-03-24 | End: 2017-04-23

## 2017-03-24 RX ORDER — DOCUSATE SODIUM 100 MG
1 CAPSULE ORAL
Qty: 0 | Refills: 0 | DISCHARGE
Start: 2017-03-24

## 2017-03-24 RX ORDER — METFORMIN HYDROCHLORIDE 850 MG/1
1 TABLET ORAL
Qty: 0 | Refills: 0 | COMMUNITY

## 2017-03-24 RX ORDER — INSULIN LISPRO 100/ML
4 VIAL (ML) SUBCUTANEOUS
Qty: 0 | Refills: 0 | DISCHARGE
Start: 2017-03-24 | End: 2017-04-23

## 2017-03-24 RX ORDER — POLYETHYLENE GLYCOL 3350 17 G/17G
17 POWDER, FOR SOLUTION ORAL
Qty: 0 | Refills: 0 | DISCHARGE
Start: 2017-03-24

## 2017-03-24 RX ORDER — SODIUM CHLORIDE 9 MG/ML
1000 INJECTION, SOLUTION INTRAVENOUS
Qty: 0 | Refills: 0 | Status: DISCONTINUED | OUTPATIENT
Start: 2017-03-24 | End: 2017-03-24

## 2017-03-24 RX ORDER — AMLODIPINE BESYLATE 2.5 MG/1
1 TABLET ORAL
Qty: 30 | Refills: 0
Start: 2017-03-24 | End: 2017-04-23

## 2017-03-24 RX ORDER — INSULIN LISPRO 100/ML
3 VIAL (ML) SUBCUTANEOUS
Qty: 0 | Refills: 0 | Status: DISCONTINUED | OUTPATIENT
Start: 2017-03-24 | End: 2017-03-24

## 2017-03-24 RX ORDER — DEXTROSE 50 % IN WATER 50 %
12.5 SYRINGE (ML) INTRAVENOUS ONCE
Qty: 0 | Refills: 0 | Status: DISCONTINUED | OUTPATIENT
Start: 2017-03-24 | End: 2017-03-24

## 2017-03-24 RX ORDER — DEXTROSE 50 % IN WATER 50 %
1 SYRINGE (ML) INTRAVENOUS ONCE
Qty: 0 | Refills: 0 | Status: DISCONTINUED | OUTPATIENT
Start: 2017-03-24 | End: 2017-03-24

## 2017-03-24 RX ORDER — INSULIN GLARGINE 100 [IU]/ML
18 INJECTION, SOLUTION SUBCUTANEOUS AT BEDTIME
Qty: 0 | Refills: 0 | Status: DISCONTINUED | OUTPATIENT
Start: 2017-03-24 | End: 2017-03-24

## 2017-03-24 RX ORDER — INSULIN LISPRO 100/ML
VIAL (ML) SUBCUTANEOUS
Qty: 0 | Refills: 0 | Status: DISCONTINUED | OUTPATIENT
Start: 2017-03-24 | End: 2017-03-24

## 2017-03-24 RX ORDER — GLIMEPIRIDE 1 MG
1 TABLET ORAL
Qty: 0 | Refills: 0 | COMMUNITY

## 2017-03-24 RX ORDER — GLUCAGON INJECTION, SOLUTION 0.5 MG/.1ML
1 INJECTION, SOLUTION SUBCUTANEOUS ONCE
Qty: 0 | Refills: 0 | Status: DISCONTINUED | OUTPATIENT
Start: 2017-03-24 | End: 2017-03-24

## 2017-03-24 RX ADMIN — SIMETHICONE 80 MILLIGRAM(S): 80 TABLET, CHEWABLE ORAL at 08:33

## 2017-03-24 RX ADMIN — AMLODIPINE BESYLATE 5 MILLIGRAM(S): 2.5 TABLET ORAL at 08:32

## 2017-03-24 RX ADMIN — Medication 7: at 12:15

## 2017-03-24 RX ADMIN — Medication 2: at 08:32

## 2017-03-24 NOTE — DISCHARGE NOTE ADULT - CARE PLAN
Principal Discharge DX:	Idiopathic acute pancreatitis without infection or necrosis  Goal:	resolve  Instructions for follow-up, activity and diet:	low fat diet  Secondary Diagnosis:	Type 2 diabetes mellitus with hyperglycemia, unspecified long term insulin use status  Goal:	c/w insulin, diet, f/u with endocrinologist  Secondary Diagnosis:	Essential hypertension  Goal:	C/w new medication, f/u with PCP for further BP control

## 2017-03-24 NOTE — DISCHARGE NOTE ADULT - SECONDARY DIAGNOSIS.
Type 2 diabetes mellitus with hyperglycemia, unspecified long term insulin use status Essential hypertension

## 2017-03-24 NOTE — DISCHARGE NOTE ADULT - MEDICATION SUMMARY - MEDICATIONS TO STOP TAKING
I will STOP taking the medications listed below when I get home from the hospital:    glimepiride 4 mg oral tablet  -- 1 tab(s) by mouth 2 times a day    metFORMIN 500 mg oral tablet  -- 1 tab(s) by mouth 2 times a day    quinapril 40 mg oral tablet  -- 1 tab(s) by mouth once a day

## 2017-03-24 NOTE — DISCHARGE NOTE ADULT - HOSPITAL COURSE
66 y/o M PMHx significant for DM type 2, HLD, HTN, and recurrent Pancreatitis, s/p cholecystectomy was admitted for abdominal pain  due to pancreatitis which first though to be due to ACEI, which were discontinued Pt had EUS, which showed inflammatory changes in the pancreas but did not meet criteria for chronic pancreatitis. Pt was Tx concentratively with IVF, NPO, pain meds and PPI,  failed PO trial, repeat imaging showed   persistent pancreatitis. HAd associated mildly elevated LFTs. Now Labd near  normal, tolerates low fat diet, no abd pain, no N/v. D/c planning and f/u discussed.     1) Epigastric/periumbilical Pain  due to   Idiopathic   Pancreatitis, persistent  Had EUS: + inflammatory changes, thought to be due to ACEI?, which was discontinued as was recommended by Dr. Louis, but still persistent pain and + imaging  MRCP: Mild stricture of CBD? also mildly elevated LFTs   Now LFts trending down, abd pain resolved , tolerates low fat diet   D/w Dr. Anderson,, cleared chiqui d/c, will f/u with DR. Louis next week      creatic head; would change diet to clear liquid only and increase iv fluids to NS at 125 ml/hr.    2) DM type II  - poor control   HB A1C: >11  C/w  Lantus 18 units  C/w ISS  Diabetic diet   D/c oral hypoglycemics  F/u with endocrinologist for further management       3) Electrolyte abn:  replaced    4) Constipation, resolved   cont colace,  Miralax PRN    5) HTN  BP stable   cont norvasc  Off ACEI    Dispo: D/c home today 68 y/o M PMHx significant for DM type 2, HLD, HTN, and recurrent Pancreatitis, s/p cholecystectomy was admitted for abdominal pain  due to pancreatitis which first though to be due to ACEI, which were discontinued Pt had EUS, which showed inflammatory changes in the pancreas but did not meet criteria for chronic pancreatitis. Pt was Tx concentratively with IVF, NPO, pain meds and PPI,  failed PO trial, repeat imaging showed   persistent pancreatitis. HAd associated mildly elevated LFTs. Now Labd near  normal, tolerates low fat diet, no abd pain, no N/v. D/c planning and f/u discussed.     PHYSICAL EXAM:  General: Well developed;  in no acute distress  Eyes: PERRLA, EOMI; conjunctiva and sclera clear  Head: Normocephalic; atraumatic  ENMT: No nasal discharge; airway clear  Neck: Supple;   Respiratory: No wheezes, rales or rhonchi  Cardiovascular: Regular rate and rhythm. S1 and S2 Normal; No murmurs  Gastrointestinal: Soft non-tender non-distended; Normal bowel sounds  Genitourinary: No costovertebral angle tenderness  Extremities: Normal range of motion,  no edema  Vascular: Peripheral pulses palpable 2+ bilaterally  Neurological: Alert and oriented x4  Skin: Warm and dry. No  rash  Musculoskeletal: Normal gait, full ROM   Psychiatric: Cooperative and appropriate    1) Epigastric/periumbilical Pain  due to   Idiopathic   Pancreatitis, persistent  Had EUS: + inflammatory changes, thought to be due to ACEI?, which was discontinued as was recommended by Dr. Louis, but still persistent pain and + imaging  MRCP: Mild stricture of CBD? also mildly elevated LFTs   Now LFts trending down, abd pain resolved , tolerates low fat diet   D/w Dr. Anderson,, cleared chiqui d/c, will f/u with DR. Louis next week      creatic head; would change diet to clear liquid only and increase iv fluids to NS at 125 ml/hr.    2) DM type II  - poor control   HB A1C: >11  C/w  Lantus 18 units  C/w ISS  Diabetic diet   D/c oral hypoglycemics  F/u with endocrinologist for further management       3) Electrolyte abn:  replaced    4) Constipation, resolved   cont colace,  Miralax PRN    5) HTN  BP stable   cont norvasc  Off ACEI    Dispo: D/c home today

## 2017-03-24 NOTE — PROGRESS NOTE ADULT - ASSESSMENT
slowly resolving pancreatitis  d/w pt regarding mild residual pancreatitis that may take weeks to heal  will try to advance diet and if tolerated consider d/c tomorrow with oral pain meds as needed for pain
66 y/o M PMHx significant for DM type 2, HLD, HTN, and recurrent Pancreatitis, s/p cholecystectomy, admitted with     1) Epigastric Pain + Acute Pancreatitis + Mild stricture of CBD on MRCP:  admitted to med floor  cont full liquid diet as per GI  prn pain meds  iv fluids    2) DM: ISS    3) Electrolyte abn:  replaced and resolved    poc discussed with pt, team
66 y/o M PMHx significant for DM type 2, HLD, HTN, and recurrent Pancreatitis, s/p cholecystectomy, admitted with     1) Epigastric/periumbilical Pain + Acute Pancreatitis + Mild stricture of CBD on MRCP:  admitted to med floor   Back on clear liquid diet sec to inability to tolerate po diet sec to post prandial pain.  prn pain meds  very slow improvement. Re[peat CT abdo of 3/21 shows pancreatitis of pancreatic head. EUS might help in  further evaluation as per GI. Would try restarting full liquid diet from 3/23 and see if he can tolerate it.   CT abdo done todat shows persistent mild pancreatitis of pancreatic head; would change diet to clear liquid only and increase iv fluids to NS at 125 ml/hr.    2) DM: ISS; Add Lantus 10 units at hs as fingersticks are more than 200    3) Electrolyte abn:  replaced and resolved    4) Constipation:  cont colace, senna; miralax    5) HTN:   cont norvasc    poc discussed with pt, team, family.
66 y/o M PMHx significant for DM type 2, HLD, HTN, and recurrent Pancreatitis, s/p cholecystectomy, admitted with     1) Epigastric/periumbilical Pain + Acute Pancreatitis + Mild stricture of CBD on MRCP:  admitted to med floor  diet advanced to low fat diet as per GI  prn pain meds  iv fluids decreased to 83 ml/hr sec to rising BP    2) DM: ISS; Add Lantus 10 units at hs as fingersticks are more than 200    3) Electrolyte abn:  replaced and resolved    4) Constipation:  cont colace, senna; miralax    5) HTN:  decrease NS to 83 ml/hr from 125 and monitor. cont norvasc    poc discussed with pt, team,
66 yo male with slowly improving pancreatitis. Patient is currently pain free and anxious to go home. Will be happy to follow up as outpatient, and consider EUS at that time. Thanks!
66 yo man admitted with recurrent pancreatitis    -ACE-I DCed  -sips of clears  -IVF  -Trend BUN, HCT, UOP  -pain management as needed
66 yo man admitted with recurrent pancreatitis    -DCed ACE I  -clear liquids  -IVF  -Trend BUN, HCT, UOP  -lfts elevated today  -Check serologies and MRI-MRCP
66yo with idiopathic pancreatitis slow to respond  lfts and lipase remain elevated  will repeat ct scan to further evaluate   d/w pt in detail
68 y/o M PMHx significant for DM type 2, HLD, HTN, and recurrent Pancreatitis, s/p cholecystectomy, admitted with       1) Epigastric/periumbilical Pain  due to   Idiopathic   Pancreatitis, persistent  Had EUS: + inflammatory changes, thought to be due to lisinopril, which was discontinued as was recommended by Dr. Louis   MRCP: Mild stricture of CBD? also mildly elevated LFTs   Now LFts trending down, abd pain Improved, tolerates Full liquid diet   D/w Dr. Anderson, will advance diet to low fat, monitor   D/c IVF   C/w PPi, Pian meds   creatic head; would change diet to clear liquid only and increase iv fluids to NS at 125 ml/hr.    2) DM:  - poor control   HB A1C: >11  C/w  Lantus 15 units  C/w ISS  Diabetic diet       3) Electrolyte abn:  replace magnesium today, recheck in am       4) Constipation:  cont colace, senna; miralax    5) HTN:  BP stable   cont norvasc
68 y/o M PMHx significant for DM type 2, HLD, HTN, and recurrent Pancreatitis, s/p cholecystectomy, admitted with     1) Epigastric/periumbilical Pain + Acute Pancreatitis + Mild stricture of CBD on MRCP:  admitted to med floor  cont full liquid diet as per GI  prn pain meds  iv fluids    2) DM: ISS    3) Electrolyte abn:  replaced and resolved    4) Constipation:  cont colace, senna; miralax    poc discussed with pt, team
68 y/o M PMHx significant for DM type 2, HLD, HTN, and recurrent Pancreatitis, s/p cholecystectomy, admitted with     1) Epigastric/periumbilical Pain + Acute Pancreatitis + Mild stricture of CBD on MRCP:  admitted to med floor  diet advanced to low fat diet as per GI  prn pain meds  iv fluids    2) DM: ISS    3) Electrolyte abn:  replaced and resolved    4) Constipation:  cont colace, senna; miralax    poc discussed with pt, team, Dr. León.
68 y/o M PMHx significant for DM type 2, HLD, HTN, and recurrent Pancreatitis, s/p cholecystectomy, admitted with     1) Epigastric/periumbilical Pain + Acute Pancreatitis + Mild stricture of CBD on MRCP:  admitted to med floor  diet advanced to low fat diet as per GI; unable to tolerate much diet;   prn pain meds  CT abdo done todat shows persistent mild pancreatitis of pancreatic head; would change diet to clear liquid only and increase iv fluids to NS at 125 ml/hr.    2) DM: ISS; Add Lantus 10 units at hs as fingersticks are more than 200    3) Electrolyte abn:  replaced and resolved    4) Constipation:  cont colace, senna; miralax    5) HTN:   cont norvasc    poc discussed with pt, team,family.
68 y/o M PMHx significant for DM type 2, HLD, HTN, and recurrent Pancreatitis, s/p cholecystectomy, admitted with     1) Epigastric/periumbilical Pain + Acute Pancreatitis + Mild stricture of CBD on MRCP:  admitted to med floor  diet advanced to low fat diet as per GI; unable to tolerate much diet;   prn pain meds  iv fluids decreased to 83 ml/hr sec to rising BP  GI to consider EGD vs ERCP vs Endoscopic US vs repeat abdominal imaging    2) DM: ISS; Add Lantus 10 units at hs as fingersticks are more than 200    3) Electrolyte abn:  replaced and resolved    4) Constipation:  cont colace, senna; miralax    5) HTN:  decrease NS to 83 ml/hr from 125 and monitor. cont norvasc    poc discussed with pt, team,
68 yo male with pancreatitis. Unclear why pancreatitis has not resolved. MR did not show clear anatomic variation. Patient may benefit from outpatient EUS. Would continue to monitor for now.
Imp:  Pancreatitis, slowly improved. Benign exam but still post-prandial pain    Plan  Cont current diet  Hopefully advance to solids once no longer needing narcotics for pain control
Imp:  Recurrent pancreatitis, ?stricture on MRCP, but may be related to pancreatitis    Rec:  Cont full liquids, but don't advance  Pain control
Imp:  Resolving idiopathic pancreatitis    Rec:  Try low fat diet
idiopathic pancreatitis -   po as tolerated will repeat labs  check lfts  - if increasing will check repeat imaging  pain control as needed
66 y/o M PMHx significant for DM type 2, HLD, HTN, and recurrent Pancreatitis, s/p cholecystectomy, who presents to the ED w/ c/o epigastric abdominal pain, moderate intensity, radiating around to the back, aggravated with food. The patient notes that his symptoms have progressively worsened over the past few 4-5 days. The patient denies any associated fevers. The patient has had prior admissions for pancreatitis. In the ED the patient was found to have an elevated Lipase 925.
66 y/o M PMHx significant for DM type 2, HLD, HTN, and recurrent Pancreatitis, s/p cholecystectomy, who presents to the ED w/ c/o epigastric abdominal pain, moderate intensity, radiating around to the back, aggravated with food. The patient notes that his symptoms have progressively worsened over the past few 4-5 days. The patient denies any associated fevers. The patient has had prior admissions for pancreatitis. In the ED the patient was found to have an elevated Lipase 925.

## 2017-03-24 NOTE — DISCHARGE NOTE ADULT - OTHER SIGNIFICANT FINDINGS
14.1   6.1   )-----------( 264      ( 24 Mar 2017 06:59 )             43.4     24 Mar 2017 06:59    140    |  105    |  8      ----------------------------<  175    3.9     |  27     |  1.04     Ca    9.6        24 Mar 2017 06:59  Phos  3.1       24 Mar 2017 06:59  Mg     2.0       24 Mar 2017 06:59    TPro  7.1    /  Alb  3.1    /  TBili  0.5    /  DBili  x      /  AST  13     /  ALT  109    /  AlkPhos  132    24 Mar 2017 06:59        LIVER FUNCTIONS - ( 24 Mar 2017 06:59 )  Alb: 3.1 g/dL / Pro: 7.1 gm/dL / ALK PHOS: 132 U/L / ALT: 109 U/L / AST: 13 U/L / GGT: x         EXAM:  CT ABDOMEN AND PELVIS OC IC                        PROCEDURE DATE:  03/21/201  FINDINGS:  Visualized lung bases: within normal limits  Liver: within normal limits  Gallbladder: Prior cholecystectomy..  Bile ducts: No intrahepatic or extrahepatic biliary dilatation  Pancreas: Mild edema within the pancreatic head with minimal   peripancreatic infiltrative changes as seen on prior exam suggesting mild   pancreatitis.No pancreatic ductal dilatation.  Spleen: within normal limits  Adrenal: within normal limits  Kidneys, Ureters and Bladder:: Symmetric enhancement of bilateral kidneys   without hydronephrosis. Subcentimeter exophytic left mid to upper pole   renal cyst. No intrarenal calculus. The ureters and bladder are within   normal limits.  Pelvis: Prostate is mildly enlarged. No pelvic adenopathy or pelvic free   fluid.  Bowel: The bowel is of normal course and caliber without evidence of   obstruction or gross bowel wall thickening. Normal appendix visualized.  Peritoneum: No intra-abdominal free air, ascites or organized fluid   collections.  Retroperitoneum:     Subcentimeter retroperitoneal lymph nodes   non-specific in nature   Vessels:     Within normal limits.  Abdominal wall:  within normal limits  Bones: Degenerative changes. No lytic or blastic lesions.      IMPRESSION:  Persistent mild pancreatitis involving the pancreatic head      EXAM:  MRI ABDOMEN W WO CONTRAST                        PROCEDURE DATE:  03/15/2017    FINDINGS:  LIVER: Within normal limits.  SPLEEN: Within normal limits.  PANCREAS: Enlargement of the pancreatic head and neck, with mildly   increased abnormal T2 signal and restricted diffusion. No discrete   enhancing mass.  GALLBLADDER: Cholecystectomy.  BILE DUCTS: Normal caliber, however there is narrowing of the distal   common bile duct in its intrapancreatic portion. No intraductal stones   are identified.  ADRENALS: Within normal limits.  KIDNEYS/URETERS: Within normal limits.  RETROPERITONEUM: No upper abdominal orretroperitoneal  lymphadenopathy.    VESSELS:  Within normal limits. Superior mesenteric, portal and splenic   veins are patent.  VISUALIZED BOWEL: The visualized bowel is unremarkable.   PERITONEUM: No ascites.  LOWER CHEST: Within normal limits.  ABDOMINAL WALL: Within normal limits.  BONES: No acute abnormality  IMPRESSION: Mild edema in the pancreatic head and neck and focal   narrowing of the distal common duct without upstream dilatation. No   discrete pancreatic mass identified. Findings are most likely related to   acute pancreatitis. Recommend follow-up MRI to assess for resolution.

## 2017-03-24 NOTE — DISCHARGE NOTE ADULT - PLAN OF CARE
resolve low fat diet c/w insulin, diet, f/u with endocrinologist C/w new medication, f/u with PCP for further BP control

## 2017-03-24 NOTE — DISCHARGE NOTE ADULT - MEDICATION SUMMARY - MEDICATIONS TO TAKE
I will START or STAY ON the medications listed below when I get home from the hospital:    traMADol 50 mg oral tablet  -- 1 tab(s) by mouth every 8 hours MDD:3 tab  -- Caution federal law prohibits the transfer of this drug to any person other  than the person for whom it was prescribed.  May cause drowsiness.  Alcohol may intensify this effect.  Use care when operating dangerous machinery.  Obtain medical advice before taking any non-prescription drugs as some may affect the action of this medication.    -- Indication: For PAin    Lantus 100 units/mL subcutaneous solution  -- 18 unit(s) subcutaneous once a day (at bedtime)  -- Indication: For Diabetes    HumaLOG KwikPen 100 units/mL subcutaneous solution  -- see sliding scale  provided unit(s) subcutaneous 4 times a day (after meals and at bedtime)  -- Do not drink alcoholic beverages when taking this medication.  It is very important that you take or use this exactly as directed.  Do not skip doses or discontinue unless directed by your doctor.  Keep in refrigerator.  Do not freeze.    -- Indication: For Diabetes    amLODIPine 5 mg oral tablet  -- 1 tab(s) by mouth once a day  -- Indication: For HTN (hypertension)    polyethylene glycol 3350 oral powder for reconstitution  -- 17 gram(s) by mouth 2 times a day, As needed, Constipation  -- Indication: For Constipation    docusate sodium 100 mg oral capsule  -- 1 cap(s) by mouth 3 times a day, As needed, Constipation  -- Indication: For Constipation

## 2017-03-24 NOTE — DISCHARGE NOTE ADULT - PATIENT PORTAL LINK FT
“You can access the [facility] Patient Portal, offered by [facility], by registering with the following website: [email or web address]”

## 2017-03-24 NOTE — PROGRESS NOTE ADULT - SUBJECTIVE AND OBJECTIVE BOX
66 y/o M PMHx significant for DM type 2, HLD, HTN, and recurrent Pancreatitis, s/p cholecystectomy      3/16:   c/o epigastric pain  no n/v      3/17:  c/o periumbilical pain, mild, better than yesterday      PHYSICAL EXAM:    Daily     Daily     ICU Vital Signs Last 24 Hrs  T(C): 36.9, Max: 36.9 (03-17 @ 05:26)  T(F): 98.4, Max: 98.5 (03-17 @ 05:26)  HR: 69 (63 - 74)  BP: 144/76 (144/76 - 155/79)  BP(mean): --  ABP: --  ABP(mean): --  RR: 18 (16 - 18)  SpO2: 98% (97% - 100%)      Constitutional: Well appearing  HEENT: Atraumatic, MARC, Normal, No congestion  Respiratory: Breath Sounds normal, no rhonchi/wheeze  Cardiovascular: N S1S2; FARAZ present  Gastrointestinal: Abdomen soft, periumbilical tenderness, Bowel Sounds present  Extremities: No edema, peripheral pulses present  Neurological: AAO x 3, no gross focal motor deficits  Skin: Non cellulitic, no rash, ulcers  Lymph Nodes: No lymphadenopathy noted  Back: No CVA tenderness   Musculoskeletal: non tender  Breasts: Deferred  Genitourinary: deferred  Rectal: Deferred                          13.6   4.0   )-----------( 231      ( 16 Mar 2017 06:34 )             41.1       CBC Full  -  ( 16 Mar 2017 06:34 )  WBC Count : 4.0 K/uL  Hemoglobin : 13.6 g/dL  Hematocrit : 41.1 %  Platelet Count - Automated : 231 K/uL  Mean Cell Volume : 89.0 fl  Mean Cell Hemoglobin : 29.5 pg  Mean Cell Hemoglobin Concentration : 33.1 gm/dL  Auto Neutrophil # : x  Auto Lymphocyte # : x  Auto Monocyte # : x  Auto Eosinophil # : x  Auto Basophil # : x  Auto Neutrophil % : x  Auto Lymphocyte % : x  Auto Monocyte % : x  Auto Eosinophil % : x  Auto Basophil % : x      16 Mar 2017 06:34    140    |  105    |  5      ----------------------------<  175    4.0     |  28     |  1.03     Ca    8.4        16 Mar 2017 06:34  Phos  3.0       16 Mar 2017 06:34  Mg     1.9       16 Mar 2017 06:34                              MEDICATIONS  (STANDING):  sodium chloride 0.9% Bolus 1000milliLiter(s) IV Bolus once  sodium chloride 0.9%. 1000milliLiter(s) IV Continuous <Continuous>  pantoprazole  Injectable 40milliGRAM(s) IV Push daily  amLODIPine   Tablet 5milliGRAM(s) Oral daily  insulin lispro (HumaLOG) corrective regimen sliding scale  SubCutaneous three times a day before meals  insulin lispro (HumaLOG) corrective regimen sliding scale  SubCutaneous at bedtime  dextrose 5%. 1000milliLiter(s) IV Continuous <Continuous>  dextrose 50% Injectable 12.5Gram(s) IV Push once  dextrose 50% Injectable 25Gram(s) IV Push once  dextrose 50% Injectable 25Gram(s) IV Push once
66 y/o M PMHx significant for DM type 2, HLD, HTN, and recurrent Pancreatitis, s/p cholecystectomy      3/16:   c/o epigastric pain  no n/v      3/17:  c/o periumbilical pain, mild, better than yesterday    3/18:  abd pain better    3/19:  tried eating chicken salad last night but it increased abdominal pain  was able to tolerate wheat cereal in breakfast with out causing abd pain      PHYSICAL EXAM:    Daily     Daily     ICU Vital Signs Last 24 Hrs  T(C): 36.9, Max: 37.1 (03-18 @ 15:37)  T(F): 98.5, Max: 98.7 (03-18 @ 15:37)  HR: 57 (57 - 65)  BP: 157/71 (131/73 - 157/71)  BP(mean): --  ABP: --  ABP(mean): --  RR: 16 (16 - 16)  SpO2: 99% (99% - 99%)      Constitutional: Well appearing  HEENT: Atraumatic, MARC, Normal, No congestion  Respiratory: Breath Sounds normal, no rhonchi/wheeze  Cardiovascular: N S1S2; FARAZ present  Gastrointestinal: Abdomen soft, non tender, Bowel Sounds present  Extremities: No edema, peripheral pulses present  Neurological: AAO x 3, no gross focal motor deficits  Skin: Non cellulitic, no rash, ulcers  Lymph Nodes: No lymphadenopathy noted  Back: No CVA tenderness   Musculoskeletal: non tender  Breasts: Deferred  Genitourinary: deferred  Rectal: Deferred                          13.6   5.5   )-----------( 207      ( 18 Mar 2017 06:15 )             41.2       CBC Full  -  ( 18 Mar 2017 06:15 )  WBC Count : 5.5 K/uL  Hemoglobin : 13.6 g/dL  Hematocrit : 41.2 %  Platelet Count - Automated : 207 K/uL  Mean Cell Volume : 89.3 fl  Mean Cell Hemoglobin : 29.4 pg  Mean Cell Hemoglobin Concentration : 32.9 gm/dL  Auto Neutrophil # : x  Auto Lymphocyte # : x  Auto Monocyte # : x  Auto Eosinophil # : x  Auto Basophil # : x  Auto Neutrophil % : x  Auto Lymphocyte % : x  Auto Monocyte % : x  Auto Eosinophil % : x  Auto Basophil % : x      18 Mar 2017 06:15    140    |  105    |  4      ----------------------------<  184    3.9     |  26     |  0.96     Ca    8.8        18 Mar 2017 06:15                              MEDICATIONS  (STANDING):  sodium chloride 0.9% Bolus 1000milliLiter(s) IV Bolus once  pantoprazole  Injectable 40milliGRAM(s) IV Push daily  amLODIPine   Tablet 5milliGRAM(s) Oral daily  insulin lispro (HumaLOG) corrective regimen sliding scale  SubCutaneous three times a day before meals  insulin lispro (HumaLOG) corrective regimen sliding scale  SubCutaneous at bedtime  dextrose 5%. 1000milliLiter(s) IV Continuous <Continuous>  dextrose 50% Injectable 12.5Gram(s) IV Push once  dextrose 50% Injectable 25Gram(s) IV Push once  dextrose 50% Injectable 25Gram(s) IV Push once  insulin glargine Injectable (LANTUS) 10Unit(s) SubCutaneous at bedtime  sodium chloride 0.9%. 1000milliLiter(s) IV Continuous <Continuous>
66 y/o M PMHx significant for DM type 2, HLD, HTN, and recurrent Pancreatitis, s/p cholecystectomy      3/16:   c/o epigastric pain  no n/v      3/17:  c/o periumbilical pain, mild, better than yesterday    3/18:  abd pain better    3/19:  tried eating chicken salad last night but it increased abdominal pain  was able to tolerate wheat cereal in breakfast with out causing abd pain    3/20:  pt c/o abd pain after eating fruit salad also. can have cream of wheat without problem.      PHYSICAL EXAM:    Daily     Daily     ICU Vital Signs Last 24 Hrs  T(C): 36.9, Max: 37.1 (03-20 @ 04:59)  T(F): 98.4, Max: 98.7 (03-20 @ 04:59)  HR: 67 (54 - 67)  BP: 155/81 (138/71 - 174/82)  BP(mean): --  ABP: --  ABP(mean): --  RR: 17 (16 - 17)  SpO2: 100% (98% - 100%)      Constitutional: Well appearing  HEENT: Atraumatic, MARC, Normal, No congestion  Respiratory: Breath Sounds normal, no rhonchi/wheeze  Cardiovascular: N S1S2; FARAZ present  Gastrointestinal: Abdomen soft, non tender, Bowel Sounds present  Extremities: No edema, peripheral pulses present  Neurological: AAO x 3, no gross focal motor deficits  Skin: Non cellulitic, no rash, ulcers  Lymph Nodes: No lymphadenopathy noted  Back: No CVA tenderness   Musculoskeletal: non tender  Breasts: Deferred  Genitourinary: deferred  Rectal: Deferred                                          MEDICATIONS  (STANDING):  sodium chloride 0.9% Bolus 1000milliLiter(s) IV Bolus once  pantoprazole  Injectable 40milliGRAM(s) IV Push daily  amLODIPine   Tablet 5milliGRAM(s) Oral daily  insulin lispro (HumaLOG) corrective regimen sliding scale  SubCutaneous three times a day before meals  insulin lispro (HumaLOG) corrective regimen sliding scale  SubCutaneous at bedtime  dextrose 5%. 1000milliLiter(s) IV Continuous <Continuous>  dextrose 50% Injectable 12.5Gram(s) IV Push once  dextrose 50% Injectable 25Gram(s) IV Push once  dextrose 50% Injectable 25Gram(s) IV Push once  insulin glargine Injectable (LANTUS) 10Unit(s) SubCutaneous at bedtime  sodium chloride 0.9%. 1000milliLiter(s) IV Continuous <Continuous>
68 y/o M PMHx significant for DM type 2, HLD, HTN, and recurrent Pancreatitis, s/p cholecystectomy      3/16:   c/o epigastric pain  no n/v      3/17:  c/o periumbilical pain, mild, better than yesterday    3/18:  abd pain better      PHYSICAL EXAM:    Daily     Daily     ICU Vital Signs Last 24 Hrs  T(C): 37.3, Max: 37.3 (03-18 @ 05:40)  T(F): 99.1, Max: 99.1 (03-18 @ 05:40)  HR: 62 (62 - 69)  BP: 138/70 (138/70 - 168/88)  BP(mean): --  ABP: --  ABP(mean): --  RR: 18 (17 - 18)  SpO2: 99% (96% - 99%)      Constitutional: Well appearing  HEENT: Atraumatic, MARC, Normal, No congestion  Respiratory: Breath Sounds normal, no rhonchi/wheeze  Cardiovascular: N S1S2; FARAZ present  Gastrointestinal: Abdomen soft, mild periumbilical tenderness, Bowel Sounds present  Extremities: No edema, peripheral pulses present  Neurological: AAO x 3, no gross focal motor deficits  Skin: Non cellulitic, no rash, ulcers  Lymph Nodes: No lymphadenopathy noted  Back: No CVA tenderness   Musculoskeletal: non tender  Breasts: Deferred  Genitourinary: deferred  Rectal: Deferred                          13.6   5.5   )-----------( 207      ( 18 Mar 2017 06:15 )             41.2       CBC Full  -  ( 18 Mar 2017 06:15 )  WBC Count : 5.5 K/uL  Hemoglobin : 13.6 g/dL  Hematocrit : 41.2 %  Platelet Count - Automated : 207 K/uL  Mean Cell Volume : 89.3 fl  Mean Cell Hemoglobin : 29.4 pg  Mean Cell Hemoglobin Concentration : 32.9 gm/dL  Auto Neutrophil # : x  Auto Lymphocyte # : x  Auto Monocyte # : x  Auto Eosinophil # : x  Auto Basophil # : x  Auto Neutrophil % : x  Auto Lymphocyte % : x  Auto Monocyte % : x  Auto Eosinophil % : x  Auto Basophil % : x      18 Mar 2017 06:15    140    |  105    |  4      ----------------------------<  184    3.9     |  26     |  0.96     Ca    8.8        18 Mar 2017 06:15                              MEDICATIONS  (STANDING):  sodium chloride 0.9% Bolus 1000milliLiter(s) IV Bolus once  sodium chloride 0.9%. 1000milliLiter(s) IV Continuous <Continuous>  pantoprazole  Injectable 40milliGRAM(s) IV Push daily  amLODIPine   Tablet 5milliGRAM(s) Oral daily  insulin lispro (HumaLOG) corrective regimen sliding scale  SubCutaneous three times a day before meals  insulin lispro (HumaLOG) corrective regimen sliding scale  SubCutaneous at bedtime  dextrose 5%. 1000milliLiter(s) IV Continuous <Continuous>  dextrose 50% Injectable 12.5Gram(s) IV Push once  dextrose 50% Injectable 25Gram(s) IV Push once  dextrose 50% Injectable 25Gram(s) IV Push once
68 y/o M PMHx significant for DM type 2, HLD, HTN, and recurrent Pancreatitis, s/p cholecystectomy      3/16:   c/o epigastric pain  no n/v      3/17:  c/o periumbilical pain, mild, better than yesterday    3/18:  abd pain better    3/19:  tried eating chicken salad last night but it increased abdominal pain  was able to tolerate wheat cereal in breakfast with out causing abd pain    3/20:  pt c/o abd pain after eating fruit salad also. can have cream of wheat without problem.    3/21:  c/o pain after eating last night        PHYSICAL EXAM:    Daily     Daily     ICU Vital Signs Last 24 Hrs  T(C): 37, Max: 37 (03-21 @ 11:49)  T(F): 98.6, Max: 98.6 (03-21 @ 11:49)  HR: 66 (58 - 66)  BP: 154/86 (146/74 - 158/83)  BP(mean): --  ABP: --  ABP(mean): --  RR: 16 (16 - 20)  SpO2: 97% (97% - 100%)      Constitutional: Well appearing  HEENT: Atraumatic, MARC, Normal, No congestion  Respiratory: Breath Sounds normal, no rhonchi/wheeze  Cardiovascular: N S1S2; FARAZ present  Gastrointestinal: Abdomen soft, non tender, Bowel Sounds present  Extremities: No edema, peripheral pulses present  Neurological: AAO x 3, no gross focal motor deficits  Skin: Non cellulitic, no rash, ulcers  Lymph Nodes: No lymphadenopathy noted  Back: No CVA tenderness   Musculoskeletal: non tender  Breasts: Deferred  Genitourinary: deferred  Rectal: Deferred                          14.0   6.7   )-----------( 217      ( 21 Mar 2017 06:52 )             42.6       CBC Full  -  ( 21 Mar 2017 06:52 )  WBC Count : 6.7 K/uL  Hemoglobin : 14.0 g/dL  Hematocrit : 42.6 %  Platelet Count - Automated : 217 K/uL  Mean Cell Volume : 89.0 fl  Mean Cell Hemoglobin : 29.2 pg  Mean Cell Hemoglobin Concentration : 32.8 gm/dL  Auto Neutrophil # : x  Auto Lymphocyte # : x  Auto Monocyte # : x  Auto Eosinophil # : x  Auto Basophil # : x  Auto Neutrophil % : x  Auto Lymphocyte % : x  Auto Monocyte % : x  Auto Eosinophil % : x  Auto Basophil % : x      21 Mar 2017 06:52    139    |  102    |  8      ----------------------------<  194    4.1     |  28     |  0.95     Ca    9.1        21 Mar 2017 06:52    TPro  7.6    /  Alb  3.2    /  TBili  0.5    /  DBili  0.2    /  AST  40     /  ALT  243    /  AlkPhos  169    21 Mar 2017 09:46      LIVER FUNCTIONS - ( 21 Mar 2017 09:46 )  Alb: 3.2 g/dL / Pro: 7.6 gm/dL / ALK PHOS: 169 U/L / ALT: 243 U/L / AST: 40 U/L / GGT: x                               MEDICATIONS  (STANDING):  sodium chloride 0.9% Bolus 1000milliLiter(s) IV Bolus once  pantoprazole  Injectable 40milliGRAM(s) IV Push daily  amLODIPine   Tablet 5milliGRAM(s) Oral daily  insulin lispro (HumaLOG) corrective regimen sliding scale  SubCutaneous three times a day before meals  insulin lispro (HumaLOG) corrective regimen sliding scale  SubCutaneous at bedtime  dextrose 5%. 1000milliLiter(s) IV Continuous <Continuous>  dextrose 50% Injectable 12.5Gram(s) IV Push once  dextrose 50% Injectable 25Gram(s) IV Push once  dextrose 50% Injectable 25Gram(s) IV Push once  insulin glargine Injectable (LANTUS) 10Unit(s) SubCutaneous at bedtime  sodium chloride 0.9%. 1000milliLiter(s) IV Continuous <Continuous>  HYDROmorphone  Injectable 0.5milliGRAM(s) IV Push every 3 hours
68 y/o M PMHx significant for DM type 2, HLD, HTN, and recurrent Pancreatitis, s/p cholecystectomy      3/16:   c/o epigastric pain  no n/v      3/17:  c/o periumbilical pain, mild, better than yesterday    3/18:  abd pain better    3/19:  tried eating chicken salad last night but it increased abdominal pain  was able to tolerate wheat cereal in breakfast with out causing abd pain    3/20:  pt c/o abd pain after eating fruit salad also. can have cream of wheat without problem.    3/21:  c/o pain after eating last night    3/22:  condition same  Lipase improving  back on clears      PHYSICAL EXAM:    Daily     Daily     ICU Vital Signs Last 24 Hrs  T(C): 37.1, Max: 37.1 (03-22 @ 11:06)  T(F): 98.7, Max: 98.7 (03-22 @ 11:06)  HR: 70 (64 - 70)  BP: 143/79 (138/69 - 157/85)  BP(mean): --  ABP: --  ABP(mean): --  RR: 16 (16 - 18)  SpO2: 100% (99% - 100%)      Constitutional: Well appearing  HEENT: Atraumatic, MARC, Normal, No congestion  Respiratory: Breath Sounds normal, no rhonchi/wheeze  Cardiovascular: N S1S2; FARAZ present  Gastrointestinal: Abdomen soft, non tender, Bowel Sounds present  Extremities: No edema, peripheral pulses present  Neurological: AAO x 3, no gross focal motor deficits  Skin: Non cellulitic, no rash, ulcers  Lymph Nodes: No lymphadenopathy noted  Back: No CVA tenderness   Musculoskeletal: non tender  Breasts: Deferred  Genitourinary: deferred  Rectal: Deferred                          14.0   6.7   )-----------( 217      ( 21 Mar 2017 06:52 )             42.6       CBC Full  -  ( 21 Mar 2017 06:52 )  WBC Count : 6.7 K/uL  Hemoglobin : 14.0 g/dL  Hematocrit : 42.6 %  Platelet Count - Automated : 217 K/uL  Mean Cell Volume : 89.0 fl  Mean Cell Hemoglobin : 29.2 pg  Mean Cell Hemoglobin Concentration : 32.8 gm/dL  Auto Neutrophil # : x  Auto Lymphocyte # : x  Auto Monocyte # : x  Auto Eosinophil # : x  Auto Basophil # : x  Auto Neutrophil % : x  Auto Lymphocyte % : x  Auto Monocyte % : x  Auto Eosinophil % : x  Auto Basophil % : x      22 Mar 2017 07:59    139    |  104    |  7      ----------------------------<  190    4.2     |  27     |  0.95     Ca    9.1        22 Mar 2017 07:59    TPro  7.3    /  Alb  3.0    /  TBili  0.4    /  DBili  x      /  AST  27     /  ALT  177    /  AlkPhos  149    22 Mar 2017 07:59      LIVER FUNCTIONS - ( 22 Mar 2017 07:59 )  Alb: 3.0 g/dL / Pro: 7.3 gm/dL / ALK PHOS: 149 U/L / ALT: 177 U/L / AST: 27 U/L / GGT: x                               MEDICATIONS  (STANDING):  sodium chloride 0.9% Bolus 1000milliLiter(s) IV Bolus once  pantoprazole  Injectable 40milliGRAM(s) IV Push daily  amLODIPine   Tablet 5milliGRAM(s) Oral daily  insulin lispro (HumaLOG) corrective regimen sliding scale  SubCutaneous three times a day before meals  insulin lispro (HumaLOG) corrective regimen sliding scale  SubCutaneous at bedtime  dextrose 5%. 1000milliLiter(s) IV Continuous <Continuous>  dextrose 50% Injectable 12.5Gram(s) IV Push once  dextrose 50% Injectable 25Gram(s) IV Push once  dextrose 50% Injectable 25Gram(s) IV Push once  insulin glargine Injectable (LANTUS) 10Unit(s) SubCutaneous at bedtime  HYDROmorphone  Injectable 0.5milliGRAM(s) IV Push every 3 hours  sodium chloride 0.9%. 1000milliLiter(s) IV Continuous <Continuous>
68 y/o M PMHx significant for DM type 2, HLD, HTN, and recurrent Pancreatitis, s/p cholecystectomy      3/16:   c/o epigastric pain  no n/v      PHYSICAL EXAM:    Daily     Daily     ICU Vital Signs Last 24 Hrs  T(C): 36.7, Max: 36.8 (03-15 @ 18:08)  T(F): 98.1, Max: 98.3 (03-16 @ 05:23)  HR: 65 (65 - 73)  BP: 143/70 (140/73 - 165/85)  BP(mean): --  ABP: --  ABP(mean): --  RR: 16 (16 - 18)  SpO2: 99% (99% - 100%)      Constitutional: Well appearing  HEENT: Atraumatic, MARC, Normal, No congestion  Respiratory: Breath Sounds normal, no rhonchi/wheeze  Cardiovascular: N S1S2; FARAZ present  Gastrointestinal: Abdomen soft, non tender, Bowel Sounds present  Extremities: No edema, peripheral pulses present  Neurological: AAO x 3, no gross focal motor deficits  Skin: Non cellulitic, no rash, ulcers  Lymph Nodes: No lymphadenopathy noted  Back: No CVA tenderness   Musculoskeletal: non tender  Breasts: Deferred  Genitourinary: deferred  Rectal: Deferred                          13.6   4.0   )-----------( 231      ( 16 Mar 2017 06:34 )             41.1       CBC Full  -  ( 16 Mar 2017 06:34 )  WBC Count : 4.0 K/uL  Hemoglobin : 13.6 g/dL  Hematocrit : 41.1 %  Platelet Count - Automated : 231 K/uL  Mean Cell Volume : 89.0 fl  Mean Cell Hemoglobin : 29.5 pg  Mean Cell Hemoglobin Concentration : 33.1 gm/dL  Auto Neutrophil # : x  Auto Lymphocyte # : x  Auto Monocyte # : x  Auto Eosinophil # : x  Auto Basophil # : x  Auto Neutrophil % : x  Auto Lymphocyte % : x  Auto Monocyte % : x  Auto Eosinophil % : x  Auto Basophil % : x      16 Mar 2017 06:34    140    |  105    |  5      ----------------------------<  175    4.0     |  28     |  1.03     Ca    8.4        16 Mar 2017 06:34  Phos  3.0       16 Mar 2017 06:34  Mg     1.9       16 Mar 2017 06:34    TPro  7.1    /  Alb  2.9    /  TBili  0.5    /  DBili  x      /  AST  95     /  ALT  129    /  AlkPhos  109    15 Mar 2017 06:58      LIVER FUNCTIONS - ( 15 Mar 2017 06:58 )  Alb: 2.9 g/dL / Pro: 7.1 gm/dL / ALK PHOS: 109 U/L / ALT: 129 U/L / AST: 95 U/L / GGT: x                               MEDICATIONS  (STANDING):  sodium chloride 0.9% Bolus 1000milliLiter(s) IV Bolus once  sodium chloride 0.9%. 1000milliLiter(s) IV Continuous <Continuous>  pantoprazole  Injectable 40milliGRAM(s) IV Push daily  amLODIPine   Tablet 5milliGRAM(s) Oral daily  insulin lispro (HumaLOG) corrective regimen sliding scale  SubCutaneous three times a day before meals  insulin lispro (HumaLOG) corrective regimen sliding scale  SubCutaneous at bedtime  dextrose 5%. 1000milliLiter(s) IV Continuous <Continuous>  dextrose 50% Injectable 12.5Gram(s) IV Push once  dextrose 50% Injectable 25Gram(s) IV Push once  dextrose 50% Injectable 25Gram(s) IV Push once
CC: "Abdominal pain" (13 Mar 2017 02:02)    HPI:  66 y/o M PMHx significant for DM type 2, HLD, HTN, and recurrent Pancreatitis, s/p cholecystectomy, who presents to the ED w/ c/o epigastric abdominal pain, moderate intensity, radiating around to the back, aggravated with food. The patient notes that his symptoms have progressively worsened over the past few 4-5 days. The patient denies any associated fevers. The patient has had prior admissions for pancreatitis. In the ED the patient was found to have an elevated Lipase 925.      INTERVAL HPI/ OVERNIGHT EVENTS: Was seen and examined,  still c/o abd distention and epigastric  pain and burning. No fevers or chills. Wants to eat. No SOB or CP.     Vital Signs Last 24 Hrs  T(C): 36.7, Max: 36.7 (03-15 @ 05:35)  T(F): 98, Max: 98 (03-15 @ 05:35)  HR: 69 (64 - 74)  BP: 157/76 (133/69 - 157/76)  BP(mean): --  RR: 18 (16 - 20)  SpO2: 94% (94% - 100%)                            14.6   7.1   )-----------( 220      ( 15 Mar 2017 06:58 )             44.5     15 Mar 2017 06:58    140    |  104    |  6      ----------------------------<  144    3.7     |  27     |  0.98     Ca    8.8        15 Mar 2017 06:58  Phos  2.2       15 Mar 2017 06:58  Mg     1.7       15 Mar 2017 06:58    TPro  7.1    /  Alb  2.9    /  TBili  0.5    /  DBili  x      /  AST  95     /  ALT  129    /  AlkPhos  109    15 Mar 2017 06:58  LIVER FUNCTIONS - ( 15 Mar 2017 06:58 )  Alb: 2.9 g/dL / Pro: 7.1 gm/dL / ALK PHOS: 109 U/L / ALT: 129 U/L / AST: 95 U/L / GGT: x           Lipase, Serum (03.15.17 @ 06:58)    Lipase, Serum: 379 U/L    Amylase, Serum Total (03.15.17 @ 06:58)    Amylase, Serum Total: 83 U/L   Urinalysis Basic - ( 12 Mar 2017 21:22 )  Color: Yellow / Appearance: Clear / S.015 / pH: x  Gluc: x / Ketone: Negative  / Bili: Negative / Urobili: Negative mg/dL   Blood: x / Protein: Negative mg/dL / Nitrite: Negative   Leuk Esterase: Negative / RBC: x / WBC x   Sq Epi: x / Non Sq Epi: x / Bacteria: x     Amylase, Serum Total (17 @ 07:07)    Amylase, Serum Total: 94 U/L    MEDICATIONS  (STANDING):  sodium chloride 0.9% Bolus 1000milliLiter(s) IV Bolus once  sodium chloride 0.9%. 1000milliLiter(s) IV Continuous <Continuous>  pantoprazole  Injectable 40milliGRAM(s) IV Push daily  amLODIPine   Tablet 5milliGRAM(s) Oral daily  insulin lispro (HumaLOG) corrective regimen sliding scale  SubCutaneous three times a day before meals  insulin lispro (HumaLOG) corrective regimen sliding scale  SubCutaneous at bedtime  dextrose 5%. 1000milliLiter(s) IV Continuous <Continuous>  dextrose 50% Injectable 12.5Gram(s) IV Push once  dextrose 50% Injectable 25Gram(s) IV Push once  dextrose 50% Injectable 25Gram(s) IV Push once  potassium acid phosphate/sodium acid phosphate tablet (K-PHOS No. 2) 1Tablet(s) Oral three times a day with meals    MEDICATIONS  (PRN):  acetaminophen   Tablet 650milliGRAM(s) Oral every 6 hours PRN For Temp greater than 38 C (100.4 F)  acetaminophen   Tablet. 650milliGRAM(s) Oral every 6 hours PRN Mild Pain (1 - 3)  ondansetron Injectable 4milliGRAM(s) IV Push every 6 hours PRN Nausea  senna 2Tablet(s) Oral at bedtime PRN Constipation  docusate sodium 100milliGRAM(s) Oral three times a day PRN Constipation  HYDROmorphone  Injectable 0.5milliGRAM(s) IV Push every 3 hours PRN Moderate Pain (4 - 6)  HYDROmorphone  Injectable 1milliGRAM(s) IV Push every 3 hours PRN Severe Pain (7 - 10)  simethicone 80milliGRAM(s) Chew every 6 hours PRN Gas  polyethylene glycol 3350 17Gram(s) Oral two times a day PRN Constipation  dextrose Gel 1Dose(s) Oral once PRN Blood Glucose LESS THAN 70 milliGRAM(s)/deciliter  glucagon  Injectable 1milliGRAM(s) IntraMuscular once PRN Glucose LESS THAN 70 milligrams/deciliter          RADIOLOGY & ADDITIONAL TESTS:    EXAM:  CT ABDOMEN AND PELVIS IC                        PROCEDURE DATE:  2017        INTERPRETATION:  Abdominal/Pelvic CT    3/13/2017 12:18 AM  Indication: Abdominal pain, pancreatitis  Comparison: MRI of 2016    FINDINGS:    LUNG BASES:  There are dependent atelectatic changes at the lung bases.   PERITONEUM:  There is no free air or focal collection.  No free fluid.  LIVER: Normal.  SPLEEN: Normal.  GALLBLADDER: Not visualized.  BILIARY TREE: Unremarkable.  PANCREAS: Normal.  ADRENAL GLANDS: Normal.  KIDNEYS: Normal.  BOWEL: The stomach is incompletely distended.  There is no small bowel   obstruction, focal bowel wall thickening. The appendix is unremarkable.   Diverticulosis. Moderate fecal load.    URINARY BLADDER: Unremarkable.  PELVIC ORGANS: The prostate is enlarged, measuring 5.6 x 5.1 x 5.2 cm.    There is no significant adenopathy.  VASCULATURE: Unremarkable.  RETROPERITONEUM:  There is no mass.  BONES: Unremarkable.  ABDOMINAL WALL:Fat-containing of focal hernia.    IMPRESSION:    No explanation for abdominal pain on this CT. unremarkable CT appearance   of the pancreas.  Moderate fecal load.  Enlarged prostate gland.
CC: "Abdominal pain" (13 Mar 2017 02:02)    HPI:  66 y/o M PMHx significant for DM type 2, HLD, HTN, and recurrent Pancreatitis, s/p cholecystectomy, who presents to the ED w/ c/o epigastric abdominal pain, moderate intensity, radiating around to the back, aggravated with food. The patient notes that his symptoms have progressively worsened over the past few 4-5 days. The patient denies any associated fevers. The patient has had prior admissions for pancreatitis. In the ED the patient was found to have an elevated Lipase 925.      INTERVAL HPI/ OVERNIGHT EVENTS: pt was seen and  examined, feels fatigued, had more pain early am, better now, states felt dizzy after morphine and some HA.  No N/V no SOB oe CP no diff urinating     Vital Signs Last 24 Hrs  T(C): 36.5, Max: 36.8 ( @ 01:19)  T(F): 97.7, Max: 98.2 ( @ 01:19)  HR: 63 (53 - 67)  BP: 114/57 (114/57 - 177/99)  BP(mean): --  RR: 18 (16 - 18)  SpO2: 97% (97% - 100%)      CARDIAC MARKERS ( 13 Mar 2017 06:48 )  <0.015 ng/mL / x     / 158 U/L / x     / x      CARDIAC MARKERS ( 12 Mar 2017 21:22 )  <0.015 ng/mL / x     / 225 U/L / x     / x                                13.8   8.7   )-----------( 252      ( 13 Mar 2017 07:23 )             42.1     13 Mar 2017 07:23    143    |  108    |  11     ----------------------------<  67     3.3     |  28     |  0.88     Ca    7.8        13 Mar 2017 07:23  Mg     1.9       13 Mar 2017 07:23    TPro  6.2    /  Alb  2.7    /  TBili  0.2    /  DBili  x      /  AST  14     /  ALT  16     /  AlkPhos  71     13 Mar 2017 07:23    PT/INR - ( 12 Mar 2017 21:22 )   PT: 11.4 sec;   INR: 1.04 ratio         PTT - ( 12 Mar 2017 21:22 )  PTT:27.9 sec  CAPILLARY BLOOD GLUCOSE    LIVER FUNCTIONS - ( 13 Mar 2017 07:23 )  Alb: 2.7 g/dL / Pro: 6.2 gm/dL / ALK PHOS: 71 U/L / ALT: 16 U/L / AST: 14 U/L / GGT: x           Urinalysis Basic - ( 12 Mar 2017 21:22 )    Color: Yellow / Appearance: Clear / S.015 / pH: x  Gluc: x / Ketone: Negative  / Bili: Negative / Urobili: Negative mg/dL   Blood: x / Protein: Negative mg/dL / Nitrite: Negative   Leuk Esterase: Negative / RBC: x / WBC x   Sq Epi: x / Non Sq Epi: x / Bacteria: x        MEDICATIONS  (STANDING):  sodium chloride 0.9% Bolus 1000milliLiter(s) IV Bolus once  sodium chloride 0.9%. 1000milliLiter(s) IV Continuous <Continuous>    MEDICATIONS  (PRN):  acetaminophen   Tablet 650milliGRAM(s) Oral every 6 hours PRN For Temp greater than 38 C (100.4 F)  acetaminophen   Tablet. 650milliGRAM(s) Oral every 6 hours PRN Mild Pain (1 - 3)  ondansetron Injectable 4milliGRAM(s) IV Push every 6 hours PRN Nausea  senna 2Tablet(s) Oral at bedtime PRN Constipation  docusate sodium 100milliGRAM(s) Oral three times a day PRN Constipation  HYDROmorphone  Injectable 0.5milliGRAM(s) IV Push every 3 hours PRN Moderate Pain (4 - 6)  HYDROmorphone  Injectable 1milliGRAM(s) IV Push every 3 hours PRN Moderate Pain (4 - 6)      RADIOLOGY & ADDITIONAL TESTS:    EXAM:  CT ABDOMEN AND PELVIS IC                        PROCEDURE DATE:  2017        INTERPRETATION:  Abdominal/Pelvic CT    3/13/2017 12:18 AM  Indication: Abdominal pain, pancreatitis  Comparison: MRI of 2016    FINDINGS:    LUNG BASES:  There are dependent atelectatic changes at the lung bases.   PERITONEUM:  There is no free air or focal collection.  No free fluid.  LIVER: Normal.  SPLEEN: Normal.  GALLBLADDER: Not visualized.  BILIARY TREE: Unremarkable.  PANCREAS: Normal.  ADRENAL GLANDS: Normal.  KIDNEYS: Normal.  BOWEL: The stomach is incompletely distended.  There is no small bowel   obstruction, focal bowel wall thickening. The appendix is unremarkable.   Diverticulosis. Moderate fecal load.    URINARY BLADDER: Unremarkable.  PELVIC ORGANS: The prostate is enlarged, measuring 5.6 x 5.1 x 5.2 cm.    There is no significant adenopathy.  VASCULATURE: Unremarkable.  RETROPERITONEUM:  There is no mass.  BONES: Unremarkable.  ABDOMINAL WALL:Fat-containing of focal hernia.    IMPRESSION:    No explanation for abdominal pain on this CT. unremarkable CT appearance   of the pancreas.  Moderate fecal load.  Enlarged prostate gland.
CC: "Abdominal pain" (13 Mar 2017 02:02)    HPI:  66 y/o M PMHx significant for DM type 2, HLD, HTN, and recurrent Pancreatitis, s/p cholecystectomy, who presents to the ED w/ c/o epigastric abdominal pain, moderate intensity, radiating around to the back, aggravated with food. The patient notes that his symptoms have progressively worsened over the past few 4-5 days. The patient denies any associated fevers. The patient has had prior admissions for pancreatitis. In the ED the patient was found to have an elevated Lipase 925.    INTERVAL HPI/ OVERNIGHT EVENTS: Pt was seen and examined, feels little better, pain improved, did not need pain med x 2 days, willing to try diet. Denies N/V/D, regular BMs. NO CP or SOB.  POC discussed     Vital Signs Last 24 Hrs  T(C): 36.9, Max: 37.1 (03-22 @ 16:43)  T(F): 98.5, Max: 98.7 (03-22 @ 16:43)  HR: 64 (58 - 66)  BP: 132/74 (132/74 - 136/59)  BP(mean): --  RR: 18 (17 - 18)  SpO2: 100% (99% - 100%)  I&O's Detail  I & Os for 24h ending 23 Mar 2017 07:00  =============================================  IN:    sodium chloride 0.9%: 2316 ml    Sodium Chloride 0.9% IV Bolus: 770 ml    Total IN: 3086 ml  ---------------------------------------------  OUT:    Total OUT: 0 ml  ---------------------------------------------  Total NET: 3086 ml    I & Os for current day (as of 23 Mar 2017 15:12)  =============================================  IN:    sodium chloride 0.9%: 834 ml    IV PiggyBack: 100 ml    Total IN: 934 ml  ---------------------------------------------  OUT:    Total OUT: 0 ml  ---------------------------------------------  Total NET: 934 ml        23 Mar 2017 06:54    142    |  107    |  4      ----------------------------<  179    4.0     |  25     |  0.91     Ca    9.1        23 Mar 2017 06:54  Phos  3.0       23 Mar 2017 06:54  Mg     1.7       23 Mar 2017 06:54    TPro  6.6    /  Alb  2.8    /  TBili  0.5    /  DBili  x      /  AST  22     /  ALT  131    /  AlkPhos  129    23 Mar 2017 06:54      CAPILLARY BLOOD GLUCOSE  170 (23 Mar 2017 11:19)  165 (23 Mar 2017 08:03)  84 (22 Mar 2017 22:06)  282 (22 Mar 2017 17:35)    LIVER FUNCTIONS - ( 23 Mar 2017 06:54 )  Alb: 2.8 g/dL / Pro: 6.6 gm/dL / ALK PHOS: 129 U/L / ALT: 131 U/L / AST: 22 U/L / GGT: x               MEDICATIONS  (STANDING):  sodium chloride 0.9% Bolus 1000milliLiter(s) IV Bolus once  pantoprazole  Injectable 40milliGRAM(s) IV Push daily  amLODIPine   Tablet 5milliGRAM(s) Oral daily  insulin lispro (HumaLOG) corrective regimen sliding scale  SubCutaneous three times a day before meals  insulin lispro (HumaLOG) corrective regimen sliding scale  SubCutaneous at bedtime  dextrose 5%. 1000milliLiter(s) IV Continuous <Continuous>  dextrose 50% Injectable 12.5Gram(s) IV Push once  dextrose 50% Injectable 25Gram(s) IV Push once  dextrose 50% Injectable 25Gram(s) IV Push once  insulin glargine Injectable (LANTUS) 10Unit(s) SubCutaneous at bedtime  HYDROmorphone  Injectable 0.5milliGRAM(s) IV Push every 3 hours    MEDICATIONS  (PRN):  acetaminophen   Tablet 650milliGRAM(s) Oral every 6 hours PRN For Temp greater than 38 C (100.4 F)  acetaminophen   Tablet. 650milliGRAM(s) Oral every 6 hours PRN Mild Pain (1 - 3)  ondansetron Injectable 4milliGRAM(s) IV Push every 6 hours PRN Nausea  senna 2Tablet(s) Oral at bedtime PRN Constipation  docusate sodium 100milliGRAM(s) Oral three times a day PRN Constipation  simethicone 80milliGRAM(s) Chew every 6 hours PRN Gas  polyethylene glycol 3350 17Gram(s) Oral two times a day PRN Constipation  dextrose Gel 1Dose(s) Oral once PRN Blood Glucose LESS THAN 70 milliGRAM(s)/deciliter  glucagon  Injectable 1milliGRAM(s) IntraMuscular once PRN Glucose LESS THAN 70 milligrams/deciliter  HYDROmorphone  Injectable 1milliGRAM(s) IV Push every 4 hours PRN Moderate Pain (4 - 6)      RADIOLOGY & ADDITIONAL TESTS:  EXAM:  CT ABDOMEN AND PELVIS OC IC                            PROCEDURE DATE:  03/21/2017        INTERPRETATION:  Clinical Information:Follow-up pancreatitis, upper   abdominal pain for 4 days  Exam Date: 3/21/2017 2:57 PM  Technique: CT of the abdomen and pelvis was performed following the   administration of oral and IV contrast      with comparison to prior   study of MRI 3/15/2017, CT 3/12/2017 .    FINDINGS:    Visualized lung bases: within normal limits    Liver: within normal limits  Gallbladder: Prior cholecystectomy..  Bile ducts: No intrahepatic or extrahepatic biliary dilatation  Pancreas: Mild edema within the pancreatic head with minimal   peripancreatic infiltrative changes as seen on prior exam suggesting mild   pancreatitis.No pancreatic ductal dilatation.    Spleen: within normal limits  Adrenal: within normal limits    Kidneys, Ureters and Bladder:: Symmetric enhancement of bilateral kidneys   without hydronephrosis. Subcentimeter exophytic left mid to upper pole   renal cyst. No intrarenal calculus. The ureters and bladder are within   normal limits.    Pelvis: Prostate is mildly enlarged. No pelvic adenopathy or pelvic free   fluid.    Bowel: The bowel is of normal course and caliber without evidence of   obstruction or gross bowel wall thickening. Normal appendix visualized.    Peritoneum: No intra-abdominal free air, ascites or organized fluid   collections.    Retroperitoneum:     Subcentimeter retroperitoneal lymph nodes   non-specific in nature   Vessels:     Within normal limits.    Abdominal wall:  within normal limits  Bones: Degenerative changes. No lytic or blastic lesions.        IMPRESSION:    Persistent mild pancreatitis involving the pancreatic head
CC: "Abdominal pain" (13 Mar 2017 02:02)    HPI:  68 y/o M PMHx significant for DM type 2, HLD, HTN, and recurrent Pancreatitis, s/p cholecystectomy, who presents to the ED w/ c/o epigastric abdominal pain, moderate intensity, radiating around to the back, aggravated with food. The patient notes that his symptoms have progressively worsened over the past few 4-5 days. The patient denies any associated fevers. The patient has had prior admissions for pancreatitis. In the ED the patient was found to have an elevated Lipase 925.      INTERVAL HPI/ OVERNIGHT EVENTS: Was seen and examined, OOb to chair, still c/o bloating and epigastric  pain and burning. No fevers or chills. Wants to eat. No SOB or CP.     Vital Signs Last 24 Hrs  T(C): 36.7, Max: 36.7 ( @ 17:01)  T(F): 98.1, Max: 98.1 ( @ 17:01)  HR: 65 (62 - 69)  BP: 161/83 (145/79 - 161/83)  BP(mean): --  RR: 18 (18 - 18)  SpO2: 98% (98% - 99%)      14 Mar 2017 07:07    141    |  106    |  7      ----------------------------<  78     3.9     |  27     |  0.95     Ca    8.5        14 Mar 2017 07:07  Phos  2.8       14 Mar 2017 07:07  Mg     1.8       14 Mar 2017 07:07    Lipase, Serum (17 @ 07:07)    Lipase, Serum: 384 U/L    Amylase, Serum Total (17 @ 07:07)    Amylase, Serum Total: 94 U/L      CARDIAC MARKERS ( 13 Mar 2017 06:48 )  <0.015 ng/mL / x     / 158 U/L / x     / x      CARDIAC MARKERS ( 12 Mar 2017 21:22 )  <0.015 ng/mL / x     / 225 U/L / x     / x        LIVER FUNCTIONS - ( 13 Mar 2017 07:23 )  Alb: 2.7 g/dL / Pro: 6.2 gm/dL / ALK PHOS: 71 U/L / ALT: 16 U/L / AST: 14 U/L / GGT: x           PT/INR - ( 12 Mar 2017 21:22 )   PT: 11.4 sec;   INR: 1.04 ratio         PTT - ( 12 Mar 2017 21:22 )  PTT:27.9 sec    Urinalysis Basic - ( 12 Mar 2017 21:22 )  Color: Yellow / Appearance: Clear / S.015 / pH: x  Gluc: x / Ketone: Negative  / Bili: Negative / Urobili: Negative mg/dL   Blood: x / Protein: Negative mg/dL / Nitrite: Negative   Leuk Esterase: Negative / RBC: x / WBC x   Sq Epi: x / Non Sq Epi: x / Bacteria: x     Amylase, Serum Total (17 @ 07:07)    Amylase, Serum Total: 94 U/L    MEDICATIONS  (STANDING):  sodium chloride 0.9% Bolus 1000milliLiter(s) IV Bolus once  sodium chloride 0.9%. 1000milliLiter(s) IV Continuous <Continuous>  insulin lispro (HumaLOG) corrective regimen sliding scale  SubCutaneous every 6 hours  dextrose 5%. 1000milliLiter(s) IV Continuous <Continuous>  dextrose 50% Injectable 12.5Gram(s) IV Push once  dextrose 50% Injectable 25Gram(s) IV Push once  dextrose 50% Injectable 25Gram(s) IV Push once  pantoprazole  Injectable 40milliGRAM(s) IV Push daily  amLODIPine   Tablet 5milliGRAM(s) Oral daily    MEDICATIONS  (PRN):  acetaminophen   Tablet 650milliGRAM(s) Oral every 6 hours PRN For Temp greater than 38 C (100.4 F)  acetaminophen   Tablet. 650milliGRAM(s) Oral every 6 hours PRN Mild Pain (1 - 3)  ondansetron Injectable 4milliGRAM(s) IV Push every 6 hours PRN Nausea  senna 2Tablet(s) Oral at bedtime PRN Constipation  docusate sodium 100milliGRAM(s) Oral three times a day PRN Constipation  HYDROmorphone  Injectable 0.5milliGRAM(s) IV Push every 3 hours PRN Moderate Pain (4 - 6)  HYDROmorphone  Injectable 1milliGRAM(s) IV Push every 3 hours PRN Severe Pain (7 - 10)  simethicone 80milliGRAM(s) Chew every 6 hours PRN Gas  dextrose Gel 1Dose(s) Oral once PRN Blood Glucose LESS THAN 70 milliGRAM(s)/deciliter  glucagon  Injectable 1milliGRAM(s) IntraMuscular once PRN Glucose LESS THAN 70 milligrams/deciliter  polyethylene glycol 3350 17Gram(s) Oral two times a day PRN Constipation        RADIOLOGY & ADDITIONAL TESTS:    EXAM:  CT ABDOMEN AND PELVIS IC                        PROCEDURE DATE:  2017        INTERPRETATION:  Abdominal/Pelvic CT    3/13/2017 12:18 AM  Indication: Abdominal pain, pancreatitis  Comparison: MRI of 2016    FINDINGS:    LUNG BASES:  There are dependent atelectatic changes at the lung bases.   PERITONEUM:  There is no free air or focal collection.  No free fluid.  LIVER: Normal.  SPLEEN: Normal.  GALLBLADDER: Not visualized.  BILIARY TREE: Unremarkable.  PANCREAS: Normal.  ADRENAL GLANDS: Normal.  KIDNEYS: Normal.  BOWEL: The stomach is incompletely distended.  There is no small bowel   obstruction, focal bowel wall thickening. The appendix is unremarkable.   Diverticulosis. Moderate fecal load.    URINARY BLADDER: Unremarkable.  PELVIC ORGANS: The prostate is enlarged, measuring 5.6 x 5.1 x 5.2 cm.    There is no significant adenopathy.  VASCULATURE: Unremarkable.  RETROPERITONEUM:  There is no mass.  BONES: Unremarkable.  ABDOMINAL WALL:Fat-containing of focal hernia.    IMPRESSION:    No explanation for abdominal pain on this CT. unremarkable CT appearance   of the pancreas.  Moderate fecal load.  Enlarged prostate gland.
HPI:  68 yo man was admitted with epigastric pain over the past few days. Has hx of recurrent pancreatitis, etiology unclear. S/P cholecystectomy. EUS performed notable for mild parenchymal changes, but did not meet all EUS criteria for chronic pancreatitis. Thought was that pancreatitis may be due to ACE-I.     No improvement in pain. +bloating. No fever. No n/v.     MEDICATIONS  (STANDING):  sodium chloride 0.9% Bolus 1000milliLiter(s) IV Bolus once  sodium chloride 0.9%. 1000milliLiter(s) IV Continuous <Continuous>  pantoprazole  Injectable 40milliGRAM(s) IV Push daily  amLODIPine   Tablet 5milliGRAM(s) Oral daily  insulin lispro (HumaLOG) corrective regimen sliding scale  SubCutaneous three times a day before meals  insulin lispro (HumaLOG) corrective regimen sliding scale  SubCutaneous at bedtime  dextrose 5%. 1000milliLiter(s) IV Continuous <Continuous>  dextrose 50% Injectable 12.5Gram(s) IV Push once  dextrose 50% Injectable 25Gram(s) IV Push once  dextrose 50% Injectable 25Gram(s) IV Push once  potassium acid phosphate/sodium acid phosphate tablet (K-PHOS No. 2) 1Tablet(s) Oral three times a day with meals    MEDICATIONS  (PRN):  acetaminophen   Tablet 650milliGRAM(s) Oral every 6 hours PRN For Temp greater than 38 C (100.4 F)  acetaminophen   Tablet. 650milliGRAM(s) Oral every 6 hours PRN Mild Pain (1 - 3)  ondansetron Injectable 4milliGRAM(s) IV Push every 6 hours PRN Nausea  senna 2Tablet(s) Oral at bedtime PRN Constipation  docusate sodium 100milliGRAM(s) Oral three times a day PRN Constipation  HYDROmorphone  Injectable 0.5milliGRAM(s) IV Push every 3 hours PRN Moderate Pain (4 - 6)  HYDROmorphone  Injectable 1milliGRAM(s) IV Push every 3 hours PRN Severe Pain (7 - 10)  simethicone 80milliGRAM(s) Chew every 6 hours PRN Gas  polyethylene glycol 3350 17Gram(s) Oral two times a day PRN Constipation  dextrose Gel 1Dose(s) Oral once PRN Blood Glucose LESS THAN 70 milliGRAM(s)/deciliter  glucagon  Injectable 1milliGRAM(s) IntraMuscular once PRN Glucose LESS THAN 70 milligrams/deciliter      Allergies    No Known Allergies    Intolerances        REVIEW OF SYSTEMS    General: no unexpected weight loss    HEENT: no icterus    Respiratory and Thorax: no SOB  	  Cardiovascular: no CP    Gastrointestinal: as above    Skin: no jaundice      Vital Signs Last 24 Hrs  T(C): 36.7, Max: 36.7 (03-14 @ 16:28)  T(F): 98, Max: 98.1 (03-14 @ 16:28)  HR: 69 (64 - 74)  BP: 157/76 (133/69 - 157/76)  BP(mean): --  RR: 18 (16 - 20)  SpO2: 94% (94% - 100%)    PHYSICAL EXAM:    Constitutional: NAD    HEENT: anicteric    Respiratory: CTA BL    Cardiovascular:  RRR    Gastrointestinal: soft mild distention +BS NTTP    Extremities: no LE edema    Neuro: no focal deficits    Skin: no jaundice      LABS:                        14.6   7.1   )-----------( 220      ( 15 Mar 2017 06:58 )             44.5     15 Mar 2017 06:58    140    |  104    |  6      ----------------------------<  144    3.7     |  27     |  0.98     Ca    8.8        15 Mar 2017 06:58  Phos  2.2       15 Mar 2017 06:58  Mg     1.7       15 Mar 2017 06:58    TPro  7.1    /  Alb  2.9    /  TBili  0.5    /  DBili  x      /  AST  95     /  ALT  129    /  AlkPhos  109    15 Mar 2017 06:58      LIVER FUNCTIONS - ( 15 Mar 2017 06:58 )  Alb: 2.9 g/dL / Pro: 7.1 gm/dL / ALK PHOS: 109 U/L / ALT: 129 U/L / AST: 95 U/L / GGT: x             RADIOLOGY & ADDITIONAL STUDIES:
HPI:  68 yo man was admitted with epigastric pain over the past few days. Has hx of recurrent pancreatitis, etiology unclear. S/P cholecystectomy. EUS performed notable for mild parenchymal changes, but did not meet all EUS criteria for chronic pancreatitis. Thought was that pancreatitis may be due to ACE-I.     Still with abdominal pain requiring narcotics. +bloating. No fever. No n/v.       MEDICATIONS  (STANDING):  sodium chloride 0.9% Bolus 1000milliLiter(s) IV Bolus once  sodium chloride 0.9%. 1000milliLiter(s) IV Continuous <Continuous>  insulin lispro (HumaLOG) corrective regimen sliding scale  SubCutaneous every 6 hours  dextrose 5%. 1000milliLiter(s) IV Continuous <Continuous>  dextrose 50% Injectable 12.5Gram(s) IV Push once  dextrose 50% Injectable 25Gram(s) IV Push once  dextrose 50% Injectable 25Gram(s) IV Push once    MEDICATIONS  (PRN):  acetaminophen   Tablet 650milliGRAM(s) Oral every 6 hours PRN For Temp greater than 38 C (100.4 F)  acetaminophen   Tablet. 650milliGRAM(s) Oral every 6 hours PRN Mild Pain (1 - 3)  ondansetron Injectable 4milliGRAM(s) IV Push every 6 hours PRN Nausea  senna 2Tablet(s) Oral at bedtime PRN Constipation  docusate sodium 100milliGRAM(s) Oral three times a day PRN Constipation  HYDROmorphone  Injectable 0.5milliGRAM(s) IV Push every 3 hours PRN Moderate Pain (4 - 6)  HYDROmorphone  Injectable 1milliGRAM(s) IV Push every 3 hours PRN Severe Pain (7 - 10)  simethicone 80milliGRAM(s) Chew every 6 hours PRN Gas  dextrose Gel 1Dose(s) Oral once PRN Blood Glucose LESS THAN 70 milliGRAM(s)/deciliter  glucagon  Injectable 1milliGRAM(s) IntraMuscular once PRN Glucose LESS THAN 70 milligrams/deciliter      Allergies    No Known Allergies    Intolerances        REVIEW OF SYSTEMS    General: no unexpected weight loss    HEENT: no icterus    Respiratory and Thorax: no SOB  	  Cardiovascular: no CP    Gastrointestinal: as above    Skin: no jaundice      Vital Signs Last 24 Hrs  T(C): 36.6, Max: 36.7 (03-13 @ 17:01)  T(F): 97.8, Max: 98.1 (03-13 @ 17:01)  HR: 66 (59 - 69)  BP: 153/77 (114/57 - 153/85)  BP(mean): --  RR: 18 (18 - 18)  SpO2: 99% (97% - 99%)    PHYSICAL EXAM:    Constitutional: NAD    HEENT: anicteric    Respiratory: CTA BL    Cardiovascular:  RRR    Gastrointestinal: soft ND +BS NTTP    Extremities: no LE edema    Neuro: no focal deficits    Skin: no jaundice      LABS:                        13.8   8.7   )-----------( 252      ( 13 Mar 2017 07:23 )             42.1     14 Mar 2017 07:07    141    |  106    |  7      ----------------------------<  78     3.9     |  27     |  0.95     Ca    8.5        14 Mar 2017 07:07  Phos  2.8       14 Mar 2017 07:07  Mg     1.8       14 Mar 2017 07:07    TPro  6.2    /  Alb  2.7    /  TBili  0.2    /  DBili  x      /  AST  14     /  ALT  16     /  AlkPhos  71     13 Mar 2017 07:23    PT/INR - ( 12 Mar 2017 21:22 )   PT: 11.4 sec;   INR: 1.04 ratio         PTT - ( 12 Mar 2017 21:22 )  PTT:27.9 sec  LIVER FUNCTIONS - ( 13 Mar 2017 07:23 )  Alb: 2.7 g/dL / Pro: 6.2 gm/dL / ALK PHOS: 71 U/L / ALT: 16 U/L / AST: 14 U/L / GGT: x             RADIOLOGY & ADDITIONAL STUDIES:
Patient is a 67y old  Male who presents with a chief complaint of "Abdominal pain" (13 Mar 2017 02:02)      HPI:  66 y/o M PMHx significant for DM type 2, HLD, HTN, and recurrent Pancreatitis, s/p cholecystectomy, who presents to the ED w/ c/o epigastric abdominal pain, moderate intensity, radiating around to the back, aggravated with food. The patient notes that his symptoms have progressively worsened over the past few 4-5 days. The patient denies any associated fevers. The patient has had prior admissions for pancreatitis. In the ED the patient was found to have an elevated Lipase 925. (13 Mar 2017 02:02)    Patient is currently pain free and tolerating low fat diet.      PAST MEDICAL & SURGICAL HISTORY:  DM (diabetes mellitus)  HTN (hypertension)  Pancreatitis  S/P laparoscopic cholecystectomy      MEDICATIONS  (STANDING):  sodium chloride 0.9% Bolus 1000milliLiter(s) IV Bolus once  pantoprazole  Injectable 40milliGRAM(s) IV Push daily  amLODIPine   Tablet 5milliGRAM(s) Oral daily  HYDROmorphone  Injectable 0.5milliGRAM(s) IV Push every 3 hours  insulin glargine Injectable (LANTUS) 15Unit(s) SubCutaneous at bedtime  insulin lispro (HumaLOG) corrective regimen sliding scale  SubCutaneous three times a day before meals  insulin lispro (HumaLOG) corrective regimen sliding scale  SubCutaneous at bedtime  dextrose 5%. 1000milliLiter(s) IV Continuous <Continuous>  dextrose 50% Injectable 12.5Gram(s) IV Push once  dextrose 50% Injectable 25Gram(s) IV Push once  dextrose 50% Injectable 25Gram(s) IV Push once    MEDICATIONS  (PRN):  acetaminophen   Tablet 650milliGRAM(s) Oral every 6 hours PRN For Temp greater than 38 C (100.4 F)  acetaminophen   Tablet. 650milliGRAM(s) Oral every 6 hours PRN Mild Pain (1 - 3)  ondansetron Injectable 4milliGRAM(s) IV Push every 6 hours PRN Nausea  senna 2Tablet(s) Oral at bedtime PRN Constipation  docusate sodium 100milliGRAM(s) Oral three times a day PRN Constipation  simethicone 80milliGRAM(s) Chew every 6 hours PRN Gas  polyethylene glycol 3350 17Gram(s) Oral two times a day PRN Constipation  HYDROmorphone  Injectable 1milliGRAM(s) IV Push every 4 hours PRN Moderate Pain (4 - 6)  dextrose Gel 1Dose(s) Oral once PRN Blood Glucose LESS THAN 70 milliGRAM(s)/deciliter  glucagon  Injectable 1milliGRAM(s) IntraMuscular once PRN Glucose LESS THAN 70 milligrams/deciliter      Allergies    No Known Allergies    Intolerances        REVIEW OF SYSTEMS:    CONSTITUTIONAL: No weakness, fevers or chills  RESPIRATORY: No cough, wheezing, hemoptysis; No shortness of breath  CARDIOVASCULAR: No chest pain or palpitations  GASTROINTESTINAL: No abdominal or epigastric pain. No nausea, vomiting, or hematemesis; No diarrhea or constipation. No melena or hematochezia.  All other review of systems is negative unless indicated above.    Vital Signs Last 24 Hrs  T(C): 36.9, Max: 36.9 (03-23 @ 11:28)  T(F): 98.4, Max: 98.5 (03-23 @ 11:28)  HR: 61 (59 - 64)  BP: 127/70 (127/70 - 148/84)  BP(mean): --  RR: 16 (16 - 18)  SpO2: 100% (100% - 100%)    PHYSICAL EXAM:    Constitutional: NAD, well-developed  Respiratory: CTAB  Cardiovascular: S1 and S2, RRR, no M/G/R  Gastrointestinal: BS+, soft, NT/ND  Extremities: No peripheral edema  Psychiatric: Normal mood, normal affect  Skin: No rashes    LABS:    23 Mar 2017 06:54    142    |  107    |  4      ----------------------------<  179    4.0     |  25     |  0.91     Ca    9.1        23 Mar 2017 06:54  Phos  3.0       23 Mar 2017 06:54  Mg     1.7       23 Mar 2017 06:54    TPro  6.6    /  Alb  2.8    /  TBili  0.5    /  DBili  x      /  AST  22     /  ALT  131    /  AlkPhos  129    23 Mar 2017 06:54      LIVER FUNCTIONS - ( 23 Mar 2017 06:54 )  Alb: 2.8 g/dL / Pro: 6.6 gm/dL / ALK PHOS: 129 U/L / ALT: 131 U/L / AST: 22 U/L / GGT: x             RADIOLOGY & ADDITIONAL STUDIES:
Patient is a 67y old  Male who presents with a chief complaint of "Abdominal pain" (13 Mar 2017 02:02)      HPI:  66 y/o M PMHx significant for DM type 2, HLD, HTN, and recurrent Pancreatitis, s/p cholecystectomy, who presents to the ED w/ c/o epigastric abdominal pain, moderate intensity, radiating around to the back, aggravated with food. The patient notes that his symptoms have progressively worsened over the past few 4-5 days. The patient denies any associated fevers. The patient has had prior admissions for pancreatitis. In the ED the patient was found to have an elevated Lipase 925. (13 Mar 2017 02:02)    Patient reports feeling somewhat better, but did have some mild pain last night. CT scan shows some residual pancreatitis (mild).    PAST MEDICAL & SURGICAL HISTORY:  DM (diabetes mellitus)  HTN (hypertension)  Pancreatitis  S/P laparoscopic cholecystectomy      MEDICATIONS  (STANDING):  sodium chloride 0.9% Bolus 1000milliLiter(s) IV Bolus once  pantoprazole  Injectable 40milliGRAM(s) IV Push daily  amLODIPine   Tablet 5milliGRAM(s) Oral daily  insulin lispro (HumaLOG) corrective regimen sliding scale  SubCutaneous three times a day before meals  insulin lispro (HumaLOG) corrective regimen sliding scale  SubCutaneous at bedtime  dextrose 5%. 1000milliLiter(s) IV Continuous <Continuous>  dextrose 50% Injectable 12.5Gram(s) IV Push once  dextrose 50% Injectable 25Gram(s) IV Push once  dextrose 50% Injectable 25Gram(s) IV Push once  insulin glargine Injectable (LANTUS) 10Unit(s) SubCutaneous at bedtime  HYDROmorphone  Injectable 0.5milliGRAM(s) IV Push every 3 hours  sodium chloride 0.9%. 1000milliLiter(s) IV Continuous <Continuous>    MEDICATIONS  (PRN):  acetaminophen   Tablet 650milliGRAM(s) Oral every 6 hours PRN For Temp greater than 38 C (100.4 F)  acetaminophen   Tablet. 650milliGRAM(s) Oral every 6 hours PRN Mild Pain (1 - 3)  ondansetron Injectable 4milliGRAM(s) IV Push every 6 hours PRN Nausea  senna 2Tablet(s) Oral at bedtime PRN Constipation  docusate sodium 100milliGRAM(s) Oral three times a day PRN Constipation  simethicone 80milliGRAM(s) Chew every 6 hours PRN Gas  polyethylene glycol 3350 17Gram(s) Oral two times a day PRN Constipation  dextrose Gel 1Dose(s) Oral once PRN Blood Glucose LESS THAN 70 milliGRAM(s)/deciliter  glucagon  Injectable 1milliGRAM(s) IntraMuscular once PRN Glucose LESS THAN 70 milligrams/deciliter      Allergies    No Known Allergies    Intolerances        REVIEW OF SYSTEMS:    CONSTITUTIONAL: No weakness, fevers or chills  RESPIRATORY: No cough, wheezing, hemoptysis; No shortness of breath  CARDIOVASCULAR: No chest pain or palpitations  GASTROINTESTINAL: No abdominal or epigastric pain. No nausea, vomiting, or hematemesis; No diarrhea or constipation. No melena or hematochezia.  All other review of systems is negative unless indicated above.    Vital Signs Last 24 Hrs  T(C): 36.8, Max: 37 (03-21 @ 11:49)  T(F): 98.3, Max: 98.6 (03-21 @ 11:49)  HR: 70 (64 - 70)  BP: 138/69 (138/69 - 157/85)  BP(mean): --  RR: 18 (16 - 18)  SpO2: 99% (97% - 100%)    PHYSICAL EXAM:    Constitutional: NAD, well-developed  Respiratory: CTAB  Cardiovascular: S1 and S2, RRR, no M/G/R  Gastrointestinal: BS+, soft, NT/ND  Extremities: No peripheral edema  Psychiatric: Normal mood, normal affect  Skin: No rashes    LABS:                        14.0   6.7   )-----------( 217      ( 21 Mar 2017 06:52 )             42.6     22 Mar 2017 07:59    139    |  104    |  7      ----------------------------<  190    4.2     |  27     |  0.95     Ca    9.1        22 Mar 2017 07:59    TPro  7.3    /  Alb  3.0    /  TBili  0.4    /  DBili  x      /  AST  27     /  ALT  177    /  AlkPhos  149    22 Mar 2017 07:59      LIVER FUNCTIONS - ( 22 Mar 2017 07:59 )  Alb: 3.0 g/dL / Pro: 7.3 gm/dL / ALK PHOS: 149 U/L / ALT: 177 U/L / AST: 27 U/L / GGT: x             RADIOLOGY & ADDITIONAL STUDIES:
Patient is a 67y old  Male who presents with a chief complaint of "Abdominal pain" (13 Mar 2017 02:02)      HPI:  Pt feeling ok.  He has intermittent epigastric pain after eating.  Worse later in day as he tries to eat more      PAST MEDICAL & SURGICAL HISTORY:  DM (diabetes mellitus)  HTN (hypertension)  Pancreatitis  S/P laparoscopic cholecystectomy      MEDICATIONS  (STANDING):  sodium chloride 0.9% Bolus 1000milliLiter(s) IV Bolus once  pantoprazole  Injectable 40milliGRAM(s) IV Push daily  amLODIPine   Tablet 5milliGRAM(s) Oral daily  insulin lispro (HumaLOG) corrective regimen sliding scale  SubCutaneous three times a day before meals  insulin lispro (HumaLOG) corrective regimen sliding scale  SubCutaneous at bedtime  dextrose 5%. 1000milliLiter(s) IV Continuous <Continuous>  dextrose 50% Injectable 12.5Gram(s) IV Push once  dextrose 50% Injectable 25Gram(s) IV Push once  dextrose 50% Injectable 25Gram(s) IV Push once  insulin glargine Injectable (LANTUS) 10Unit(s) SubCutaneous at bedtime  sodium chloride 0.9%. 1000milliLiter(s) IV Continuous <Continuous>    MEDICATIONS  (PRN):  acetaminophen   Tablet 650milliGRAM(s) Oral every 6 hours PRN For Temp greater than 38 C (100.4 F)  acetaminophen   Tablet. 650milliGRAM(s) Oral every 6 hours PRN Mild Pain (1 - 3)  ondansetron Injectable 4milliGRAM(s) IV Push every 6 hours PRN Nausea  senna 2Tablet(s) Oral at bedtime PRN Constipation  docusate sodium 100milliGRAM(s) Oral three times a day PRN Constipation  HYDROmorphone  Injectable 0.5milliGRAM(s) IV Push every 3 hours PRN Moderate Pain (4 - 6)  HYDROmorphone  Injectable 1milliGRAM(s) IV Push every 3 hours PRN Severe Pain (7 - 10)  simethicone 80milliGRAM(s) Chew every 6 hours PRN Gas  polyethylene glycol 3350 17Gram(s) Oral two times a day PRN Constipation  dextrose Gel 1Dose(s) Oral once PRN Blood Glucose LESS THAN 70 milliGRAM(s)/deciliter  glucagon  Injectable 1milliGRAM(s) IntraMuscular once PRN Glucose LESS THAN 70 milligrams/deciliter      Allergies    No Known Allergies    REVIEW OF SYSTEMS:    CONSTITUTIONAL: No weakness, fevers or chills  RESPIRATORY: No cough, wheezing, hemoptysis; No shortness of breath  CARDIOVASCULAR: No chest pain or palpitations  GASTROINTESTINAL: post-prandial epigastric pain    Vital Signs Last 24 Hrs  T(C): 36.7, Max: 37.1 (03-20 @ 04:59)  T(F): 98.1, Max: 98.7 (03-20 @ 04:59)  HR: 61 (59 - 67)  BP: 151/80 (143/65 - 174/82)  BP(mean): --  RR: 20 (16 - 20)  SpO2: 100% (99% - 100%)    PHYSICAL EXAM:    Constitutional: NAD, well-developed  Respiratory: CTAB  Cardiovascular: S1 and S2, RRR, no M/G/R  Gastrointestinal: BS+, soft,mildly tender epigastrium                  RADIOLOGY & ADDITIONAL STUDIES:
Patient is a 67y old  Male who presents with a chief complaint of "Abdominal pain" (13 Mar 2017 02:02)      HPI:  Pt still with post-prandial pain      PAST MEDICAL & SURGICAL HISTORY:  DM (diabetes mellitus)  HTN (hypertension)  Pancreatitis  S/P laparoscopic cholecystectomy      MEDICATIONS  (STANDING):  sodium chloride 0.9% Bolus 1000milliLiter(s) IV Bolus once  pantoprazole  Injectable 40milliGRAM(s) IV Push daily  amLODIPine   Tablet 5milliGRAM(s) Oral daily  insulin lispro (HumaLOG) corrective regimen sliding scale  SubCutaneous three times a day before meals  insulin lispro (HumaLOG) corrective regimen sliding scale  SubCutaneous at bedtime  dextrose 5%. 1000milliLiter(s) IV Continuous <Continuous>  dextrose 50% Injectable 12.5Gram(s) IV Push once  dextrose 50% Injectable 25Gram(s) IV Push once  dextrose 50% Injectable 25Gram(s) IV Push once  insulin glargine Injectable (LANTUS) 10Unit(s) SubCutaneous at bedtime  sodium chloride 0.9%. 1000milliLiter(s) IV Continuous <Continuous>  HYDROmorphone  Injectable 0.5milliGRAM(s) IV Push every 3 hours    MEDICATIONS  (PRN):  acetaminophen   Tablet 650milliGRAM(s) Oral every 6 hours PRN For Temp greater than 38 C (100.4 F)  acetaminophen   Tablet. 650milliGRAM(s) Oral every 6 hours PRN Mild Pain (1 - 3)  ondansetron Injectable 4milliGRAM(s) IV Push every 6 hours PRN Nausea  senna 2Tablet(s) Oral at bedtime PRN Constipation  docusate sodium 100milliGRAM(s) Oral three times a day PRN Constipation  simethicone 80milliGRAM(s) Chew every 6 hours PRN Gas  polyethylene glycol 3350 17Gram(s) Oral two times a day PRN Constipation  dextrose Gel 1Dose(s) Oral once PRN Blood Glucose LESS THAN 70 milliGRAM(s)/deciliter  glucagon  Injectable 1milliGRAM(s) IntraMuscular once PRN Glucose LESS THAN 70 milligrams/deciliter      Allergies    No Known Allergies    REVIEW OF SYSTEMS:    CONSTITUTIONAL: No weakness, fevers or chills  RESPIRATORY: No cough, wheezing, hemoptysis; No shortness of breath  CARDIOVASCULAR: No chest pain or palpitations  GASTROINTESTINAL: persistent mild epigastric pain after eating - no nausea    Vital Signs Last 24 Hrs  T(C): 37, Max: 37 (03-21 @ 11:49)  T(F): 98.6, Max: 98.6 (03-21 @ 11:49)  HR: 66 (58 - 66)  BP: 154/86 (146/74 - 158/83)  BP(mean): --  RR: 16 (16 - 20)  SpO2: 97% (97% - 100%)    PHYSICAL EXAM:    Constitutional: NAD, well-developed  Respiratory: CTAB  Cardiovascular: S1 and S2, RRR, no M/G/R  Gastrointestinal: BS+, soft,mildly tender epigastrium  Extremities: No peripheral edema      LABS:                        14.0   6.7   )-----------( 217      ( 21 Mar 2017 06:52 )             42.6     21 Mar 2017 06:52    139    |  102    |  8      ----------------------------<  194    4.1     |  28     |  0.95     Ca    9.1        21 Mar 2017 06:52    TPro  7.6    /  Alb  3.2    /  TBili  0.5    /  DBili  0.2    /  AST  40     /  ALT  243    /  AlkPhos  169    21 Mar 2017 09:46      LIVER FUNCTIONS - ( 21 Mar 2017 09:46 )  Alb: 3.2 g/dL / Pro: 7.6 gm/dL / ALK PHOS: 169 U/L / ALT: 243 U/L / AST: 40 U/L / GGT: x             RADIOLOGY & ADDITIONAL STUDIES:
Patient is a 67y old  Male who presents with a chief complaint of "Abdominal pain" (13 Mar 2017 02:02)      Subective:  Pain improved but still with some discomfort, sam after eating. Took dilaudid x 1 today    PAST MEDICAL & SURGICAL HISTORY:  DM (diabetes mellitus)  HTN (hypertension)  Pancreatitis  S/P laparoscopic cholecystectomy      MEDICATIONS  (STANDING):  sodium chloride 0.9% Bolus 1000milliLiter(s) IV Bolus once  sodium chloride 0.9%. 1000milliLiter(s) IV Continuous <Continuous>  pantoprazole  Injectable 40milliGRAM(s) IV Push daily  amLODIPine   Tablet 5milliGRAM(s) Oral daily  insulin lispro (HumaLOG) corrective regimen sliding scale  SubCutaneous three times a day before meals  insulin lispro (HumaLOG) corrective regimen sliding scale  SubCutaneous at bedtime  dextrose 5%. 1000milliLiter(s) IV Continuous <Continuous>  dextrose 50% Injectable 12.5Gram(s) IV Push once  dextrose 50% Injectable 25Gram(s) IV Push once  dextrose 50% Injectable 25Gram(s) IV Push once    MEDICATIONS  (PRN):  acetaminophen   Tablet 650milliGRAM(s) Oral every 6 hours PRN For Temp greater than 38 C (100.4 F)  acetaminophen   Tablet. 650milliGRAM(s) Oral every 6 hours PRN Mild Pain (1 - 3)  ondansetron Injectable 4milliGRAM(s) IV Push every 6 hours PRN Nausea  senna 2Tablet(s) Oral at bedtime PRN Constipation  docusate sodium 100milliGRAM(s) Oral three times a day PRN Constipation  HYDROmorphone  Injectable 0.5milliGRAM(s) IV Push every 3 hours PRN Moderate Pain (4 - 6)  HYDROmorphone  Injectable 1milliGRAM(s) IV Push every 3 hours PRN Severe Pain (7 - 10)  simethicone 80milliGRAM(s) Chew every 6 hours PRN Gas  polyethylene glycol 3350 17Gram(s) Oral two times a day PRN Constipation  dextrose Gel 1Dose(s) Oral once PRN Blood Glucose LESS THAN 70 milliGRAM(s)/deciliter  glucagon  Injectable 1milliGRAM(s) IntraMuscular once PRN Glucose LESS THAN 70 milligrams/deciliter      REVIEW OF SYSTEMS:    RESPIRATORY: No shortness of breath  CARDIOVASCULAR: No chest pain  All other review of systems is negative unless indicated above.    Vital Signs Last 24 Hrs  T(C): 37.2, Max: 37.2 (03-17 @ 16:38)  T(F): 98.9, Max: 98.9 (03-17 @ 16:38)  HR: 69 (63 - 74)  BP: 146/75 (144/76 - 154/73)  BP(mean): --  RR: 17 (16 - 18)  SpO2: 96% (96% - 100%)    PHYSICAL EXAM:    Constitutional: NAD, well-developed  Respiratory: CTAB  Cardiovascular: S1 and S2, RRR  Gastrointestinal: BS+, soft, NT/ND  Extremities: No peripheral edema  Psychiatric: Normal mood, normal affect    LABS:                        13.6   4.0   )-----------( 231      ( 16 Mar 2017 06:34 )             41.1     16 Mar 2017 06:34    140    |  105    |  5      ----------------------------<  175    4.0     |  28     |  1.03     Ca    8.4        16 Mar 2017 06:34  Phos  3.0       16 Mar 2017 06:34  Mg     1.9       16 Mar 2017 06:34            RADIOLOGY & ADDITIONAL STUDIES:
Patient is a 67y old  Male who presents with a chief complaint of "Abdominal pain" (13 Mar 2017 02:02)      Subective:  Still with epigastric pain requiring pain medication  Tolerates Full liquids    PAST MEDICAL & SURGICAL HISTORY:  DM (diabetes mellitus)  HTN (hypertension)  Pancreatitis  S/P laparoscopic cholecystectomy      MEDICATIONS  (STANDING):  sodium chloride 0.9% Bolus 1000milliLiter(s) IV Bolus once  sodium chloride 0.9%. 1000milliLiter(s) IV Continuous <Continuous>  pantoprazole  Injectable 40milliGRAM(s) IV Push daily  amLODIPine   Tablet 5milliGRAM(s) Oral daily  insulin lispro (HumaLOG) corrective regimen sliding scale  SubCutaneous three times a day before meals  insulin lispro (HumaLOG) corrective regimen sliding scale  SubCutaneous at bedtime  dextrose 5%. 1000milliLiter(s) IV Continuous <Continuous>  dextrose 50% Injectable 12.5Gram(s) IV Push once  dextrose 50% Injectable 25Gram(s) IV Push once  dextrose 50% Injectable 25Gram(s) IV Push once    MEDICATIONS  (PRN):  acetaminophen   Tablet 650milliGRAM(s) Oral every 6 hours PRN For Temp greater than 38 C (100.4 F)  acetaminophen   Tablet. 650milliGRAM(s) Oral every 6 hours PRN Mild Pain (1 - 3)  ondansetron Injectable 4milliGRAM(s) IV Push every 6 hours PRN Nausea  senna 2Tablet(s) Oral at bedtime PRN Constipation  docusate sodium 100milliGRAM(s) Oral three times a day PRN Constipation  HYDROmorphone  Injectable 0.5milliGRAM(s) IV Push every 3 hours PRN Moderate Pain (4 - 6)  HYDROmorphone  Injectable 1milliGRAM(s) IV Push every 3 hours PRN Severe Pain (7 - 10)  simethicone 80milliGRAM(s) Chew every 6 hours PRN Gas  polyethylene glycol 3350 17Gram(s) Oral two times a day PRN Constipation  dextrose Gel 1Dose(s) Oral once PRN Blood Glucose LESS THAN 70 milliGRAM(s)/deciliter  glucagon  Injectable 1milliGRAM(s) IntraMuscular once PRN Glucose LESS THAN 70 milligrams/deciliter      REVIEW OF SYSTEMS:    RESPIRATORY: No shortness of breath  CARDIOVASCULAR: No chest pain  All other review of systems is negative unless indicated above.    Vital Signs Last 24 Hrs  T(C): 36.8, Max: 36.8 (03-15 @ 18:08)  T(F): 98.3, Max: 98.3 (03-16 @ 05:23)  HR: 67 (67 - 73)  BP: 140/73 (140/73 - 165/85)  BP(mean): --  RR: 16 (16 - 18)  SpO2: 99% (94% - 100%)    PHYSICAL EXAM:    Constitutional: NAD, well-developed  Respiratory: CTAB  Cardiovascular: S1 and S2, RRR  Gastrointestinal: BS+, soft, NT/ND  Extremities: No peripheral edema  Psychiatric: Normal mood, normal affect    LABS:                        13.6   4.0   )-----------( 231      ( 16 Mar 2017 06:34 )             41.1     16 Mar 2017 06:34    140    |  105    |  5      ----------------------------<  175    4.0     |  28     |  1.03     Ca    8.4        16 Mar 2017 06:34  Phos  3.0       16 Mar 2017 06:34  Mg     1.9       16 Mar 2017 06:34    TPro  7.1    /  Alb  2.9    /  TBili  0.5    /  DBili  x      /  AST  95     /  ALT  129    /  AlkPhos  109    15 Mar 2017 06:58      LIVER FUNCTIONS - ( 15 Mar 2017 06:58 )  Alb: 2.9 g/dL / Pro: 7.1 gm/dL / ALK PHOS: 109 U/L / ALT: 129 U/L / AST: 95 U/L / GGT: x             RADIOLOGY & ADDITIONAL STUDIES:    EXAM:  MRI ABDOMEN W WO CONTRAST                            PROCEDURE DATE:  03/15/2017        INTERPRETATION:  Clinical information: Recurrent pancreatitis. Abnormal   LFTs.    TECHNIQUE: Magnetic resonance imaging of the abdomen was performed   utilizing multiplanar multisequential imaging both before and after the   uncomplicated administration of 8.5 cc of Gadavist with 1.5 cc discarded.    COMPARISON: CT abdomen March 12, 2017. MRI abdomen July 18, 2016.    FINDINGS:    LIVER: Within normal limits.  SPLEEN: Within normal limits.  PANCREAS: Enlargement of the pancreatic head and neck, with mildly   increased abnormal T2 signal and restricted diffusion. No discrete   enhancing mass.  GALLBLADDER: Cholecystectomy.  BILE DUCTS: Normal caliber, however there is narrowing of the distal   common bile duct in its intrapancreatic portion. No intraductal stones   are identified.  ADRENALS: Within normal limits.  KIDNEYS/URETERS: Within normal limits.    RETROPERITONEUM: No upper abdominal orretroperitoneal  lymphadenopathy.    VESSELS:  Within normal limits. Superior mesenteric, portal and splenic   veins are patent.  VISUALIZED BOWEL: The visualized bowel is unremarkable.   PERITONEUM: No ascites.    LOWER CHEST: Within normal limits.  ABDOMINAL WALL: Within normal limits.  BONES: No acute abnormality    IMPRESSION: Mild edema in the pancreatic head and neck and focal   narrowing of the distal common duct without upstream dilatation. No   discrete pancreatic mass identified. Findings are most likely related to   acute pancreatitis. Recommend follow-up MRI to assess for resolution.              MONA ARMSTRONG   This document has been electronically signed. Mar 15 2017  5:40PM
Patient is a 67y old  Male who presents with a chief complaint of "Abdominal pain" (13 Mar 2017 02:02)      Subective:  Tolerated full liquids  Only some epigastric burn, no pain    PAST MEDICAL & SURGICAL HISTORY:  DM (diabetes mellitus)  HTN (hypertension)  Pancreatitis  S/P laparoscopic cholecystectomy      MEDICATIONS  (STANDING):  sodium chloride 0.9% Bolus 1000milliLiter(s) IV Bolus once  sodium chloride 0.9%. 1000milliLiter(s) IV Continuous <Continuous>  pantoprazole  Injectable 40milliGRAM(s) IV Push daily  amLODIPine   Tablet 5milliGRAM(s) Oral daily  insulin lispro (HumaLOG) corrective regimen sliding scale  SubCutaneous three times a day before meals  insulin lispro (HumaLOG) corrective regimen sliding scale  SubCutaneous at bedtime  dextrose 5%. 1000milliLiter(s) IV Continuous <Continuous>  dextrose 50% Injectable 12.5Gram(s) IV Push once  dextrose 50% Injectable 25Gram(s) IV Push once  dextrose 50% Injectable 25Gram(s) IV Push once    MEDICATIONS  (PRN):  acetaminophen   Tablet 650milliGRAM(s) Oral every 6 hours PRN For Temp greater than 38 C (100.4 F)  acetaminophen   Tablet. 650milliGRAM(s) Oral every 6 hours PRN Mild Pain (1 - 3)  ondansetron Injectable 4milliGRAM(s) IV Push every 6 hours PRN Nausea  senna 2Tablet(s) Oral at bedtime PRN Constipation  docusate sodium 100milliGRAM(s) Oral three times a day PRN Constipation  HYDROmorphone  Injectable 0.5milliGRAM(s) IV Push every 3 hours PRN Moderate Pain (4 - 6)  HYDROmorphone  Injectable 1milliGRAM(s) IV Push every 3 hours PRN Severe Pain (7 - 10)  simethicone 80milliGRAM(s) Chew every 6 hours PRN Gas  polyethylene glycol 3350 17Gram(s) Oral two times a day PRN Constipation  dextrose Gel 1Dose(s) Oral once PRN Blood Glucose LESS THAN 70 milliGRAM(s)/deciliter  glucagon  Injectable 1milliGRAM(s) IntraMuscular once PRN Glucose LESS THAN 70 milligrams/deciliter      REVIEW OF SYSTEMS:    RESPIRATORY: No shortness of breath  CARDIOVASCULAR: No chest pain  All other review of systems is negative unless indicated above.    Vital Signs Last 24 Hrs  T(C): 37.3, Max: 37.3 (03-18 @ 05:40)  T(F): 99.1, Max: 99.1 (03-18 @ 05:40)  HR: 62 (62 - 69)  BP: 138/70 (138/70 - 168/88)  BP(mean): --  RR: 18 (17 - 18)  SpO2: 99% (96% - 99%)    PHYSICAL EXAM:    Constitutional: NAD, well-developed  Respiratory: CTAB  Cardiovascular: S1 and S2, RRR  Gastrointestinal: BS+, soft, NT/ND  Extremities: No peripheral edema  Psychiatric: Normal mood, normal affect    LABS:                        13.6   5.5   )-----------( 207      ( 18 Mar 2017 06:15 )             41.2     18 Mar 2017 06:15    140    |  105    |  4      ----------------------------<  184    3.9     |  26     |  0.96     Ca    8.8        18 Mar 2017 06:15
Patient is a 67y old  Male who presents with a chief complaint of "Abdominal pain" (13 Mar 2017 02:02)      HPI:  pt feeling ok today  no pain and tolerated clears      PAST MEDICAL & SURGICAL HISTORY:  DM (diabetes mellitus)  HTN (hypertension)  Pancreatitis  S/P laparoscopic cholecystectomy      MEDICATIONS  (STANDING):  sodium chloride 0.9% Bolus 1000milliLiter(s) IV Bolus once  pantoprazole  Injectable 40milliGRAM(s) IV Push daily  amLODIPine   Tablet 5milliGRAM(s) Oral daily  insulin lispro (HumaLOG) corrective regimen sliding scale  SubCutaneous three times a day before meals  insulin lispro (HumaLOG) corrective regimen sliding scale  SubCutaneous at bedtime  dextrose 5%. 1000milliLiter(s) IV Continuous <Continuous>  dextrose 50% Injectable 12.5Gram(s) IV Push once  dextrose 50% Injectable 25Gram(s) IV Push once  dextrose 50% Injectable 25Gram(s) IV Push once  insulin glargine Injectable (LANTUS) 10Unit(s) SubCutaneous at bedtime  HYDROmorphone  Injectable 0.5milliGRAM(s) IV Push every 3 hours  sodium chloride 0.9%. 1000milliLiter(s) IV Continuous <Continuous>  magnesium sulfate  IVPB 1Gram(s) IV Intermittent once    MEDICATIONS  (PRN):  acetaminophen   Tablet 650milliGRAM(s) Oral every 6 hours PRN For Temp greater than 38 C (100.4 F)  acetaminophen   Tablet. 650milliGRAM(s) Oral every 6 hours PRN Mild Pain (1 - 3)  ondansetron Injectable 4milliGRAM(s) IV Push every 6 hours PRN Nausea  senna 2Tablet(s) Oral at bedtime PRN Constipation  docusate sodium 100milliGRAM(s) Oral three times a day PRN Constipation  simethicone 80milliGRAM(s) Chew every 6 hours PRN Gas  polyethylene glycol 3350 17Gram(s) Oral two times a day PRN Constipation  dextrose Gel 1Dose(s) Oral once PRN Blood Glucose LESS THAN 70 milliGRAM(s)/deciliter  glucagon  Injectable 1milliGRAM(s) IntraMuscular once PRN Glucose LESS THAN 70 milligrams/deciliter  HYDROmorphone  Injectable 1milliGRAM(s) IV Push every 4 hours PRN Moderate Pain (4 - 6)      Allergies    No Known Allergies  REVIEW OF SYSTEMS:    CONSTITUTIONAL: No weakness, fevers or chills  RESPIRATORY: No cough, wheezing, hemoptysis; No shortness of breath  CARDIOVASCULAR: No chest pain or palpitations  GASTROINTESTINAL: mild dyspepsia    Vital Signs Last 24 Hrs  T(C): 36.9, Max: 37.1 (03-22 @ 16:43)  T(F): 98.5, Max: 98.7 (03-22 @ 16:43)  HR: 64 (58 - 66)  BP: 132/74 (132/74 - 136/59)  BP(mean): --  RR: 18 (17 - 18)  SpO2: 100% (99% - 100%)    PHYSICAL EXAM:    Constitutional: NAD, well-developed  Respiratory: CTAB  Cardiovascular: S1 and S2, RRR, no M/G/R  Gastrointestinal: BS+, soft, NT/ND    LABS:    23 Mar 2017 06:54    142    |  107    |  4      ----------------------------<  179    4.0     |  25     |  0.91     Ca    9.1        23 Mar 2017 06:54  Phos  3.0       23 Mar 2017 06:54  Mg     1.7       23 Mar 2017 06:54    TPro  6.6    /  Alb  2.8    /  TBili  0.5    /  DBili  x      /  AST  22     /  ALT  131    /  AlkPhos  129    23 Mar 2017 06:54      LIVER FUNCTIONS - ( 23 Mar 2017 06:54 )  Alb: 2.8 g/dL / Pro: 6.6 gm/dL / ALK PHOS: 129 U/L / ALT: 131 U/L / AST: 22 U/L / GGT: x             RADIOLOGY & ADDITIONAL STUDIES:

## 2017-03-24 NOTE — DISCHARGE NOTE ADULT - CARE PROVIDERS DIRECT ADDRESSES
[email or web address],[email or web address],DirectAddress_Unknown

## 2017-03-24 NOTE — DISCHARGE NOTE ADULT - CARE PROVIDER_API CALL
Martha Louis (), Gastroenterology; Internal Medicine  [address] [place] [address]  Phone: [phone]  Fax: [phone]    Navraro Welch), Internal Medicine  [address] [place] [address]  Phone: [phone]  Fax: [phone]

## 2017-03-28 DIAGNOSIS — Z79.4 LONG TERM (CURRENT) USE OF INSULIN: ICD-10-CM

## 2017-03-28 DIAGNOSIS — I10 ESSENTIAL (PRIMARY) HYPERTENSION: ICD-10-CM

## 2017-03-28 DIAGNOSIS — E78.5 HYPERLIPIDEMIA, UNSPECIFIED: ICD-10-CM

## 2017-03-28 DIAGNOSIS — E83.42 HYPOMAGNESEMIA: ICD-10-CM

## 2017-03-28 DIAGNOSIS — E11.9 TYPE 2 DIABETES MELLITUS WITHOUT COMPLICATIONS: ICD-10-CM

## 2017-03-28 DIAGNOSIS — R10.9 UNSPECIFIED ABDOMINAL PAIN: ICD-10-CM

## 2017-03-28 DIAGNOSIS — Z79.899 OTHER LONG TERM (CURRENT) DRUG THERAPY: ICD-10-CM

## 2017-03-28 DIAGNOSIS — K85.90 ACUTE PANCREATITIS WITHOUT NECROSIS OR INFECTION, UNSPECIFIED: ICD-10-CM

## 2017-03-28 DIAGNOSIS — E83.39 OTHER DISORDERS OF PHOSPHORUS METABOLISM: ICD-10-CM

## 2017-03-28 DIAGNOSIS — K59.00 CONSTIPATION, UNSPECIFIED: ICD-10-CM

## 2017-03-28 DIAGNOSIS — E87.6 HYPOKALEMIA: ICD-10-CM

## 2017-03-29 DIAGNOSIS — K83.1 OBSTRUCTION OF BILE DUCT: ICD-10-CM

## 2017-05-22 ENCOUNTER — EMERGENCY (EMERGENCY)
Facility: HOSPITAL | Age: 67
LOS: 0 days | Discharge: HOME | End: 2017-05-22
Attending: FAMILY MEDICINE | Admitting: FAMILY MEDICINE
Payer: COMMERCIAL

## 2017-05-22 VITALS
RESPIRATION RATE: 16 BRPM | HEART RATE: 63 BPM | DIASTOLIC BLOOD PRESSURE: 80 MMHG | TEMPERATURE: 98 F | SYSTOLIC BLOOD PRESSURE: 148 MMHG | OXYGEN SATURATION: 100 %

## 2017-05-22 VITALS
DIASTOLIC BLOOD PRESSURE: 102 MMHG | HEART RATE: 73 BPM | TEMPERATURE: 99 F | RESPIRATION RATE: 15 BRPM | SYSTOLIC BLOOD PRESSURE: 171 MMHG | HEIGHT: 70 IN | OXYGEN SATURATION: 99 % | WEIGHT: 169.98 LBS

## 2017-05-22 DIAGNOSIS — M54.5 LOW BACK PAIN: ICD-10-CM

## 2017-05-22 DIAGNOSIS — M54.2 CERVICALGIA: ICD-10-CM

## 2017-05-22 DIAGNOSIS — Z90.49 ACQUIRED ABSENCE OF OTHER SPECIFIED PARTS OF DIGESTIVE TRACT: Chronic | ICD-10-CM

## 2017-05-22 DIAGNOSIS — E11.9 TYPE 2 DIABETES MELLITUS WITHOUT COMPLICATIONS: ICD-10-CM

## 2017-05-22 DIAGNOSIS — I10 ESSENTIAL (PRIMARY) HYPERTENSION: ICD-10-CM

## 2017-05-22 LAB
ALBUMIN SERPL ELPH-MCNC: 3.2 G/DL — LOW (ref 3.3–5)
ALP SERPL-CCNC: 85 U/L — SIGNIFICANT CHANGE UP (ref 40–120)
ALT FLD-CCNC: 28 U/L — SIGNIFICANT CHANGE UP (ref 12–78)
ANION GAP SERPL CALC-SCNC: 6 MMOL/L — SIGNIFICANT CHANGE UP (ref 5–17)
AST SERPL-CCNC: 23 U/L — SIGNIFICANT CHANGE UP (ref 15–37)
BASOPHILS # BLD AUTO: 0.1 K/UL — SIGNIFICANT CHANGE UP (ref 0–0.2)
BASOPHILS NFR BLD AUTO: 1.4 % — SIGNIFICANT CHANGE UP (ref 0–2)
BILIRUB SERPL-MCNC: 0.2 MG/DL — SIGNIFICANT CHANGE UP (ref 0.2–1.2)
BUN SERPL-MCNC: 18 MG/DL — SIGNIFICANT CHANGE UP (ref 7–23)
CALCIUM SERPL-MCNC: 9.2 MG/DL — SIGNIFICANT CHANGE UP (ref 8.5–10.1)
CHLORIDE SERPL-SCNC: 108 MMOL/L — SIGNIFICANT CHANGE UP (ref 96–108)
CO2 SERPL-SCNC: 26 MMOL/L — SIGNIFICANT CHANGE UP (ref 22–31)
CREAT SERPL-MCNC: 1.02 MG/DL — SIGNIFICANT CHANGE UP (ref 0.5–1.3)
EOSINOPHIL # BLD AUTO: 0.4 K/UL — SIGNIFICANT CHANGE UP (ref 0–0.5)
EOSINOPHIL NFR BLD AUTO: 3.9 % — SIGNIFICANT CHANGE UP (ref 0–6)
GLUCOSE SERPL-MCNC: 197 MG/DL — HIGH (ref 70–99)
HCT VFR BLD CALC: 42.9 % — SIGNIFICANT CHANGE UP (ref 39–50)
HGB BLD-MCNC: 14.1 G/DL — SIGNIFICANT CHANGE UP (ref 13–17)
LYMPHOCYTES # BLD AUTO: 4.7 K/UL — HIGH (ref 1–3.3)
LYMPHOCYTES # BLD AUTO: 52.6 % — HIGH (ref 13–44)
MCHC RBC-ENTMCNC: 29.7 PG — SIGNIFICANT CHANGE UP (ref 27–34)
MCHC RBC-ENTMCNC: 32.8 GM/DL — SIGNIFICANT CHANGE UP (ref 32–36)
MCV RBC AUTO: 90.6 FL — SIGNIFICANT CHANGE UP (ref 80–100)
MONOCYTES # BLD AUTO: 0.4 K/UL — SIGNIFICANT CHANGE UP (ref 0–0.9)
MONOCYTES NFR BLD AUTO: 4.4 % — SIGNIFICANT CHANGE UP (ref 2–14)
NEUTROPHILS # BLD AUTO: 3.4 K/UL — SIGNIFICANT CHANGE UP (ref 1.8–7.4)
NEUTROPHILS NFR BLD AUTO: 37.6 % — LOW (ref 43–77)
PLATELET # BLD AUTO: 202 K/UL — SIGNIFICANT CHANGE UP (ref 150–400)
POTASSIUM SERPL-MCNC: 4.4 MMOL/L — SIGNIFICANT CHANGE UP (ref 3.5–5.3)
POTASSIUM SERPL-SCNC: 4.4 MMOL/L — SIGNIFICANT CHANGE UP (ref 3.5–5.3)
PROT SERPL-MCNC: 7.2 GM/DL — SIGNIFICANT CHANGE UP (ref 6–8.3)
RBC # BLD: 4.74 M/UL — SIGNIFICANT CHANGE UP (ref 4.2–5.8)
RBC # FLD: 12 % — SIGNIFICANT CHANGE UP (ref 10.3–14.5)
SODIUM SERPL-SCNC: 140 MMOL/L — SIGNIFICANT CHANGE UP (ref 135–145)
WBC # BLD: 9 K/UL — SIGNIFICANT CHANGE UP (ref 3.8–10.5)
WBC # FLD AUTO: 9 K/UL — SIGNIFICANT CHANGE UP (ref 3.8–10.5)

## 2017-05-22 PROCEDURE — 72125 CT NECK SPINE W/O DYE: CPT | Mod: 26

## 2017-05-22 PROCEDURE — 70450 CT HEAD/BRAIN W/O DYE: CPT | Mod: 26

## 2017-05-22 PROCEDURE — 71260 CT THORAX DX C+: CPT | Mod: 26

## 2017-05-22 PROCEDURE — 99285 EMERGENCY DEPT VISIT HI MDM: CPT

## 2017-05-22 RX ORDER — SODIUM CHLORIDE 9 MG/ML
250 INJECTION INTRAMUSCULAR; INTRAVENOUS; SUBCUTANEOUS ONCE
Qty: 0 | Refills: 0 | Status: COMPLETED | OUTPATIENT
Start: 2017-05-22 | End: 2017-05-22

## 2017-05-22 RX ORDER — MORPHINE SULFATE 50 MG/1
4 CAPSULE, EXTENDED RELEASE ORAL ONCE
Qty: 0 | Refills: 0 | Status: DISCONTINUED | OUTPATIENT
Start: 2017-05-22 | End: 2017-05-22

## 2017-05-22 RX ORDER — KETOROLAC TROMETHAMINE 30 MG/ML
15 SYRINGE (ML) INJECTION ONCE
Qty: 0 | Refills: 0 | Status: DISCONTINUED | OUTPATIENT
Start: 2017-05-22 | End: 2017-05-22

## 2017-05-22 RX ADMIN — MORPHINE SULFATE 4 MILLIGRAM(S): 50 CAPSULE, EXTENDED RELEASE ORAL at 18:15

## 2017-05-22 RX ADMIN — Medication 15 MILLIGRAM(S): at 20:45

## 2017-05-22 RX ADMIN — Medication 15 MILLIGRAM(S): at 21:36

## 2017-05-22 RX ADMIN — MORPHINE SULFATE 4 MILLIGRAM(S): 50 CAPSULE, EXTENDED RELEASE ORAL at 20:00

## 2017-05-22 RX ADMIN — SODIUM CHLORIDE 250 MILLILITER(S): 9 INJECTION INTRAMUSCULAR; INTRAVENOUS; SUBCUTANEOUS at 20:45

## 2017-05-22 NOTE — ED PROVIDER NOTE - MUSCULOSKELETAL, MLM
Spine appears normal, range of motion is not limited, no muscle or joint tenderness, No anterior chest wall tenderness.

## 2017-05-22 NOTE — ED PROVIDER NOTE - ENMT, MLM
Airway patent, Nasal mucosa clear. Mouth with normal mucosa. Throat has no vesicles, no oropharyngeal exudates and uvula is midline. Neck in c-collar. Left neck tenderness. No septal hematoma, no edwards sign. No CSF leakage from ears, nose.
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
## 2017-05-22 NOTE — ED PROVIDER NOTE - PROGRESS NOTE DETAILS
POCUS FAST negative for intraabdominal free fluid Venkat Urrutia, on behalf of Attending, Dr. Toscano. Collar removed and patient with significant pain to left para-spinal c-spine. Will ice and re-assess. pt seen and reassesed. dicussed ct head and neck results. patient symptomatically improved.  pending ctchest/abd/pelvis read discussed CT chest read (contusions) w/ trauma surgeon Dr. Coelho, recommended incentive spirometer and outpatient fu  patient comfortable, given copy of results, instructed on how to use incentive spirometer.
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
## 2017-05-22 NOTE — ED PROVIDER NOTE - NS ED MD SCRIBE ATTENDING SCRIBE SECTIONS
HISTORY OF PRESENT ILLNESS/PROGRESS NOTE/DISPOSITION/PHYSICAL EXAM/VITAL SIGNS( Pullset)/REVIEW OF SYSTEMS/RESULTS/PAST MEDICAL/SURGICAL/SOCIAL HISTORY

## 2017-05-22 NOTE — ED PROVIDER NOTE - DETAILS:
Documentation shared with scribe as noted. Agree with notes. Pt exam, treatment and plan contemporaneously with resident . Agree with notes.

## 2017-05-22 NOTE — ED PROVIDER NOTE - PLAN OF CARE
1) Please follow-up with your primary care doctor in the next 2-3 days.  Please call tomorrow for an appointment.  If you cannot follow-up with your primary care doctor please return to the ED for any urgent issues.  2) You were given a copy of the tests performed today.  Please bring the results with you and review them with your primary care doctor.  3) If you have any worsening of symptoms or any other concerns please return to the ED immediately.  4) Please continue taking your home medications as directed.  5) Please take 400 mg of Ibuprofen (aka Motrin, Advil) and/or 500 mg Acetaminophen (aka Tylenol) every 6 hours, as needed, for mild-moderate pain.  Please do not take these medications if you have any history of bleeding disorders, kidney or liver disease. 1) Please follow-up with your primary care doctor in the next 2-3 days.  Please call tomorrow for an appointment.  If you cannot follow-up with your primary care doctor please return to the ED for any urgent issues.  2) You were given a copy of the tests performed today.  Please bring the results with you and review them with your primary care doctor.  3) If you have any worsening of symptoms or any other concerns please return to the ED immediately.  4) Please continue taking your home medications as directed.  5) Please take 400 mg of Ibuprofen (aka Motrin, Advil) and/or 500 mg Acetaminophen (aka Tylenol) every 6 hours, as needed, for mild-moderate pain.  Please do not take these medications if you have any history of bleeding disorders, kidney or liver disease.  6) Please use incentive spirometer as directed.

## 2017-05-22 NOTE — ED ADULT NURSE REASSESSMENT NOTE - NS ED NURSE REASSESS COMMENT FT1
pt medicated for pain as ordered, VSS, Iv fluids infusing. Pending results. will continue to monitor.
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
## 2017-05-22 NOTE — ED ADULT NURSE NOTE - CHPI ED SYMPTOMS NEG
no dizziness/no laceration/no fussiness/no decreased eating/drinking/no disorientation/no loss of consciousness/no difficulty bearing weight/no bruising/no sleeping issues

## 2017-05-23 PROCEDURE — 74177 CT ABD & PELVIS W/CONTRAST: CPT | Mod: 26

## 2018-05-07 NOTE — H&P ADULT - PROBLEM/PLAN-4
DISPLAY PLAN FREE TEXT Father  Still living? Unknown  Family history of stomach cancer, Age at diagnosis: Age Unknown

## 2019-01-10 NOTE — PATIENT PROFILE ADULT. - SURGICAL SITE INCISION
form in Dr Clifford's basket  Fax to [phone]  
Form done. Please Fax back. Thanks.   
Form faxed. Copy sent to [facility]  
no

## 2019-01-15 ENCOUNTER — TRANSCRIPTION ENCOUNTER (OUTPATIENT)
Age: 69
End: 2019-01-15

## 2019-02-14 NOTE — ED ADULT TRIAGE NOTE - MODE OF ARRIVAL
----- Message from Gely Gauthier MD sent at 2/13/2019  6:07 PM CST -----  Regarding: FW: anticoagulant bridging      ----- Message -----  From: CRISTHIAN Hou  Sent: 2/12/2019   1:14 PM  To: Gely Gauthier MD  Subject: RE: anticoagulant bridging                       Great thank you! Would you be willing to order this for the patient? I will call him with instructions    Thank you,   Catalina Puckett NP    ----- Message -----  From: Gely Gauthier MD  Sent: 2/12/2019  12:26 PM  To: CRISTHIAN Echols  Subject: RE: anticoagulant bridging                       I would favor bridging with Lovenox, he has had a CVA from A. fib, it is paroxysmal and most of the time he is in sinus, the Lovenox can be stopped whenever Dr. Main Azul wishes prior to the surgery  ----- Message -----  From: CRISTHIAN Hou  Sent: 2/12/2019  11:24 AM  To: Gely Gauthier MD  Subject: anticoagulant bridging                           Hi Dr. Jethro Limon,     Dr. Main Azul and I saw a patient of yours, Mr Modesta Mascorro, regarding a Zenker's diverticulum today. He is taking xarelto for his atrial fib. For surgery, he will need to hold the xarelto for 5 days prior to surgery, do you want him to be bridged with anything in that time? We're tentatively planning surgery the first week of March.     Thanks,   Catalina Puckett NP  [facility]

## 2019-05-12 ENCOUNTER — EMERGENCY (EMERGENCY)
Facility: HOSPITAL | Age: 69
LOS: 0 days | Discharge: HOME | End: 2019-05-12
Attending: EMERGENCY MEDICINE | Admitting: EMERGENCY MEDICINE
Payer: COMMERCIAL

## 2019-05-12 VITALS — WEIGHT: 179.9 LBS | HEIGHT: 66 IN

## 2019-05-12 VITALS
RESPIRATION RATE: 16 BRPM | DIASTOLIC BLOOD PRESSURE: 76 MMHG | SYSTOLIC BLOOD PRESSURE: 140 MMHG | HEART RATE: 67 BPM | OXYGEN SATURATION: 97 %

## 2019-05-12 DIAGNOSIS — I10 ESSENTIAL (PRIMARY) HYPERTENSION: ICD-10-CM

## 2019-05-12 DIAGNOSIS — K85.00 IDIOPATHIC ACUTE PANCREATITIS WITHOUT NECROSIS OR INFECTION: ICD-10-CM

## 2019-05-12 DIAGNOSIS — Z79.899 OTHER LONG TERM (CURRENT) DRUG THERAPY: ICD-10-CM

## 2019-05-12 DIAGNOSIS — Z79.4 LONG TERM (CURRENT) USE OF INSULIN: ICD-10-CM

## 2019-05-12 DIAGNOSIS — E11.9 TYPE 2 DIABETES MELLITUS WITHOUT COMPLICATIONS: ICD-10-CM

## 2019-05-12 DIAGNOSIS — Z90.49 ACQUIRED ABSENCE OF OTHER SPECIFIED PARTS OF DIGESTIVE TRACT: Chronic | ICD-10-CM

## 2019-05-12 DIAGNOSIS — R10.13 EPIGASTRIC PAIN: ICD-10-CM

## 2019-05-12 LAB
ALBUMIN SERPL ELPH-MCNC: 3.5 G/DL — SIGNIFICANT CHANGE UP (ref 3.3–5)
ALP SERPL-CCNC: 92 U/L — SIGNIFICANT CHANGE UP (ref 40–120)
ALT FLD-CCNC: 22 U/L — SIGNIFICANT CHANGE UP (ref 12–78)
ANION GAP SERPL CALC-SCNC: 9 MMOL/L — SIGNIFICANT CHANGE UP (ref 5–17)
AST SERPL-CCNC: 13 U/L — LOW (ref 15–37)
BASOPHILS # BLD AUTO: 0 K/UL — SIGNIFICANT CHANGE UP (ref 0–0.2)
BASOPHILS NFR BLD AUTO: 0 % — SIGNIFICANT CHANGE UP (ref 0–2)
BILIRUB SERPL-MCNC: 0.6 MG/DL — SIGNIFICANT CHANGE UP (ref 0.2–1.2)
BUN SERPL-MCNC: 18 MG/DL — SIGNIFICANT CHANGE UP (ref 7–23)
CALCIUM SERPL-MCNC: 8.9 MG/DL — SIGNIFICANT CHANGE UP (ref 8.5–10.1)
CHLORIDE SERPL-SCNC: 104 MMOL/L — SIGNIFICANT CHANGE UP (ref 96–108)
CO2 SERPL-SCNC: 25 MMOL/L — SIGNIFICANT CHANGE UP (ref 22–31)
CREAT SERPL-MCNC: 1.17 MG/DL — SIGNIFICANT CHANGE UP (ref 0.5–1.3)
EOSINOPHIL # BLD AUTO: 0.24 K/UL — SIGNIFICANT CHANGE UP (ref 0–0.5)
EOSINOPHIL NFR BLD AUTO: 3 % — SIGNIFICANT CHANGE UP (ref 0–6)
GLUCOSE SERPL-MCNC: 165 MG/DL — HIGH (ref 70–99)
HCT VFR BLD CALC: 48 % — SIGNIFICANT CHANGE UP (ref 39–50)
HGB BLD-MCNC: 15.3 G/DL — SIGNIFICANT CHANGE UP (ref 13–17)
LACTATE SERPL-SCNC: 1.2 MMOL/L — SIGNIFICANT CHANGE UP (ref 0.7–2)
LIDOCAIN IGE QN: 465 U/L — HIGH (ref 73–393)
LYMPHOCYTES # BLD AUTO: 1.95 K/UL — SIGNIFICANT CHANGE UP (ref 1–3.3)
LYMPHOCYTES # BLD AUTO: 24 % — SIGNIFICANT CHANGE UP (ref 13–44)
MCHC RBC-ENTMCNC: 28.7 PG — SIGNIFICANT CHANGE UP (ref 27–34)
MCHC RBC-ENTMCNC: 31.9 GM/DL — LOW (ref 32–36)
MCV RBC AUTO: 90.1 FL — SIGNIFICANT CHANGE UP (ref 80–100)
MONOCYTES # BLD AUTO: 0.49 K/UL — SIGNIFICANT CHANGE UP (ref 0–0.9)
MONOCYTES NFR BLD AUTO: 6 % — SIGNIFICANT CHANGE UP (ref 2–14)
NEUTROPHILS # BLD AUTO: 3.91 K/UL — SIGNIFICANT CHANGE UP (ref 1.8–7.4)
NEUTROPHILS NFR BLD AUTO: 48 % — SIGNIFICANT CHANGE UP (ref 43–77)
NRBC # BLD: SIGNIFICANT CHANGE UP /100 WBCS (ref 0–0)
PLATELET # BLD AUTO: 223 K/UL — SIGNIFICANT CHANGE UP (ref 150–400)
POTASSIUM SERPL-MCNC: 4.8 MMOL/L — SIGNIFICANT CHANGE UP (ref 3.5–5.3)
POTASSIUM SERPL-SCNC: 4.8 MMOL/L — SIGNIFICANT CHANGE UP (ref 3.5–5.3)
PROT SERPL-MCNC: 7.6 GM/DL — SIGNIFICANT CHANGE UP (ref 6–8.3)
RBC # BLD: 5.33 M/UL — SIGNIFICANT CHANGE UP (ref 4.2–5.8)
RBC # FLD: 13.2 % — SIGNIFICANT CHANGE UP (ref 10.3–14.5)
SODIUM SERPL-SCNC: 138 MMOL/L — SIGNIFICANT CHANGE UP (ref 135–145)
TROPONIN I SERPL-MCNC: <0.015 NG/ML — SIGNIFICANT CHANGE UP (ref 0.01–0.04)
TROPONIN I SERPL-MCNC: <0.015 NG/ML — SIGNIFICANT CHANGE UP (ref 0.01–0.04)
WBC # BLD: 8.14 K/UL — SIGNIFICANT CHANGE UP (ref 3.8–10.5)
WBC # FLD AUTO: 8.14 K/UL — SIGNIFICANT CHANGE UP (ref 3.8–10.5)

## 2019-05-12 PROCEDURE — 93010 ELECTROCARDIOGRAM REPORT: CPT

## 2019-05-12 PROCEDURE — 71046 X-RAY EXAM CHEST 2 VIEWS: CPT | Mod: 26

## 2019-05-12 PROCEDURE — 74177 CT ABD & PELVIS W/CONTRAST: CPT | Mod: 26

## 2019-05-12 PROCEDURE — 99285 EMERGENCY DEPT VISIT HI MDM: CPT

## 2019-05-12 RX ORDER — SODIUM CHLORIDE 9 MG/ML
1000 INJECTION INTRAMUSCULAR; INTRAVENOUS; SUBCUTANEOUS ONCE
Refills: 0 | Status: COMPLETED | OUTPATIENT
Start: 2019-05-12 | End: 2019-05-12

## 2019-05-12 RX ORDER — MORPHINE SULFATE 50 MG/1
4 CAPSULE, EXTENDED RELEASE ORAL ONCE
Refills: 0 | Status: DISCONTINUED | OUTPATIENT
Start: 2019-05-12 | End: 2019-05-12

## 2019-05-12 RX ORDER — ONDANSETRON 8 MG/1
4 TABLET, FILM COATED ORAL ONCE
Refills: 0 | Status: COMPLETED | OUTPATIENT
Start: 2019-05-12 | End: 2019-05-12

## 2019-05-12 RX ORDER — ONDANSETRON 8 MG/1
1 TABLET, FILM COATED ORAL
Qty: 20 | Refills: 0
Start: 2019-05-12

## 2019-05-12 RX ADMIN — ONDANSETRON 4 MILLIGRAM(S): 8 TABLET, FILM COATED ORAL at 12:22

## 2019-05-12 RX ADMIN — ONDANSETRON 4 MILLIGRAM(S): 8 TABLET, FILM COATED ORAL at 14:26

## 2019-05-12 RX ADMIN — MORPHINE SULFATE 4 MILLIGRAM(S): 50 CAPSULE, EXTENDED RELEASE ORAL at 12:55

## 2019-05-12 RX ADMIN — MORPHINE SULFATE 4 MILLIGRAM(S): 50 CAPSULE, EXTENDED RELEASE ORAL at 12:22

## 2019-05-12 RX ADMIN — SODIUM CHLORIDE 1000 MILLILITER(S): 9 INJECTION INTRAMUSCULAR; INTRAVENOUS; SUBCUTANEOUS at 12:22

## 2019-05-12 RX ADMIN — MORPHINE SULFATE 4 MILLIGRAM(S): 50 CAPSULE, EXTENDED RELEASE ORAL at 15:23

## 2019-05-12 RX ADMIN — SODIUM CHLORIDE 1000 MILLILITER(S): 9 INJECTION INTRAMUSCULAR; INTRAVENOUS; SUBCUTANEOUS at 13:31

## 2019-05-12 NOTE — ED STATDOCS - ATTENDING CONTRIBUTION TO CARE
I, Joan Aldrich MD, personally saw the patient with ACP.  I have personally performed a face to face diagnostic evaluation on this patient.  I have reviewed the ACP note and agree with the history, exam, and plan of care, except as noted.

## 2019-05-12 NOTE — ED STATDOCS - CARE PROVIDER_API CALL
Remy Cline)  Gastroenterology; Internal Medicine  [address], [address]  [place] [address]  Phone: [phone]  Fax: [phone]  Follow Up Time:

## 2019-05-12 NOTE — ED STATDOCS - CLINICAL SUMMARY MEDICAL DECISION MAKING FREE TEXT BOX
70 y/o male with a hx of DM, pancreatitis, cholecystectomy presents with epigastric and chest pain, differential includes ACS, gastritis, pancreatitis. Will obtain blood work, rehydrate. obtain imaging and reassess. 68 y/o male with a hx of DM, pancreatitis, cholecystectomy presents with epigastric and chest pain, differential includes ACS, gastritis, pancreatitis. Will obtain blood work, rehydrate. obtain imaging and reassess. Labs & CT consistent with acute pancreatitis. EKG, CXR unremarkable. Pain well controlled, tolerating PO. Plan for d/c with close GI follow up.

## 2019-05-12 NOTE — ED STATDOCS - PROGRESS NOTE DETAILS
70 y/o M with PMH of DM on metformin, pancreatitis, s/p cholecystectomy presents with epigastric pain x 3-4 days. States pain is consistent with prior episode of pancreatitis. Describes pain as burning, stating it radiates to RUQ, LUQ and chest. States pain aggravated by spicy food. Has been consuming only liquids since onset of symptoms in effort to alleviate pain. States he has been using ibuprofen as well with temporary relief in pain. Pt states he's had endoscopy in the past with no findings. Had NM stress test 1 year ago which was unremarkable. PE: Well appearing. NAD. Cardiac: s1s2, RRR. Lungs: CTAB. Abdomen: NBS x4, soft, +Epigastric tendernes. A/P: R/o ACS, pancreatitis. Plan for EKG, CXR, labs, analgesia, CTAP. Reassess. - Catrachito Chau PA-C Labs and imaging reviewed, consistent with acute pancreatitis. Pain well controlled. Tolerating PO. Plan for d/c. Advised close GI follow up this week. Advised to return to ED in event of worsening pain despite medication, persistent vomiting, fever, chills. - Catrachito Chau PA-C

## 2019-05-12 NOTE — ED STATDOCS - NSFOLLOWUPINSTRUCTIONS_ED_ALL_ED_FT
Pancreatitis    WHAT YOU NEED TO KNOW:    What is pancreatitis? Pancreatitis is inflammation of your pancreas. The pancreas is an organ that makes insulin. It also makes enzymes (digestive juices) that help your body digest food. Pancreatitis may be an acute (short-term) problem that happens only once. It may become a chronic (long-term) problem that comes and goes over time.Pancreas         What causes pancreatitis? Pancreatitis is usually caused by alcohol or gallstones. Less common causes are certain medicines, an injury to the abdomen, some procedures, and infections. High levels of triglycerides (fats) and calcium may also cause pancreatitis.    What are the signs and symptoms of pancreatitis?     Severe burning, stabbing, or aching pain that starts in the top of your abdomen and spreads to your back      Fever      Nausea and vomiting      Abdomen that is tender to the touch      Weight loss without trying    How is pancreatitis diagnosed? Your healthcare provider will examine you and ask about your symptoms. You may need any of the following:     Blood tests may show infection, pancreas function, or provide information about your overall health.      An x-ray, ultrasound, or CT may show the cause of your pancreatitis and help healthcare providers plan your treatment. You may be given contrast liquid to help your pancreas show up better in the pictures. Tell the healthcare provider if you have ever had an allergic reaction to contrast liquid.       Endoscopic retrograde cholangiopancreatography (ERCP) is a procedure used to find stones, tumors, or narrowed bile ducts. A long tube with a camera on the end is passed down your throat and into your stomach and abdomen.    How is pancreatitis treated? Treatment depends on the cause of your pancreatitis. You may need to stay in the hospital for treatment and more tests.    Medicines:   Prescription pain medicine may be given. Ask your healthcare provider how to take this medicine safely. Some prescription pain medicines contain acetaminophen. Do not take other medicines that contain acetaminophen without talking to your healthcare provider. Too much acetaminophen may cause liver damage. Prescription pain medicine may cause constipation. Ask your healthcare provider how to prevent or treat constipation.       Antibiotics may be given to treat a bacterial infection.      Surgery may be needed to open or widen blocked bile ducts or drain fluid from your pancreas. Your gallbladder may need to be removed if gallstones are causing your pancreatitis.    How can I manage my symptoms?     Rest when you feel it is needed. Slowly start to do more each day. Return to your usual activities as directed.      Do not drink any alcohol. If you need help to stop drinking, contact the following organization:     Alcoholics Anonymous  Web Address: http://www.aa.org          Ask your healthcare provider or dietitian about the best foods to eat. You may need to eat foods that are low in fat if you have chronic pancreatitis.      Do not smoke. Nicotine and other chemicals in cigarettes and cigars can cause damage. Ask your healthcare provider for information if you currently smoke and need help to quit. E-cigarettes or smokeless tobacco still contain nicotine. Talk to your healthcare provider before you use these products.     When should I call my doctor?     You have severe pain in your abdomen and you are vomiting.      You have a fever.       You continue to lose weight without trying.      Your skin or the whites of your eyes turn yellow.      You have questions or concerns about your condition or care.    CARE AGREEMENT:    You have the right to help plan your care. Learn about your health condition and how it may be treated. Discuss treatment options with your healthcare providers to decide what care you want to receive. You always have the right to refuse treatment.    USE PERCOCET AS NEEDED FOR PAIN, ZOFRAN AS NEEDED FOR NAUSEA. RETURN TO ED IN EVENT OF FEVER, CHILLS, VOMITING, WORSENING PAIN. FOLLOW UP WITH DR. PANDA WITHIN 48 HOURS.

## 2019-05-12 NOTE — ED STATDOCS - OBJECTIVE STATEMENT
68 y/o male with a PMHx of DM on Metformin and Lantus, HTN, Pancreatitis, s/p laparoscopic cholecystectomy presents to the ED c/o CP x 3 days. Pt states that the pain is a burning pain that radiates diffusely across his chest. Denies SOB, lightheadedness, N/V, diarrhea. Denies any heavy lifting. No hx of ulcers. Last had stress test in the last year, test was normal. Has been taking Ibuprofen for his pain, states the medication gave him some relief. Nonsmoker. No EtOH abuse. GI: Dr. Oro.

## 2019-05-13 PROBLEM — I10 ESSENTIAL (PRIMARY) HYPERTENSION: Chronic | Status: ACTIVE | Noted: 2017-03-13

## 2019-05-13 PROBLEM — E11.9 TYPE 2 DIABETES MELLITUS WITHOUT COMPLICATIONS: Chronic | Status: ACTIVE | Noted: 2017-03-13

## 2019-05-13 PROBLEM — K85.90 ACUTE PANCREATITIS WITHOUT NECROSIS OR INFECTION, UNSPECIFIED: Chronic | Status: ACTIVE | Noted: 2017-03-13

## 2019-05-14 ENCOUNTER — INPATIENT (INPATIENT)
Facility: HOSPITAL | Age: 69
LOS: 4 days | Discharge: HOME | End: 2019-05-19
Attending: INTERNAL MEDICINE | Admitting: INTERNAL MEDICINE
Payer: COMMERCIAL

## 2019-05-14 VITALS — HEIGHT: 66 IN | WEIGHT: 179.9 LBS

## 2019-05-14 DIAGNOSIS — Z90.49 ACQUIRED ABSENCE OF OTHER SPECIFIED PARTS OF DIGESTIVE TRACT: Chronic | ICD-10-CM

## 2019-05-14 LAB
ALBUMIN SERPL ELPH-MCNC: 3.6 G/DL — SIGNIFICANT CHANGE UP (ref 3.3–5)
ALP SERPL-CCNC: 92 U/L — SIGNIFICANT CHANGE UP (ref 40–120)
ALT FLD-CCNC: 26 U/L — SIGNIFICANT CHANGE UP (ref 12–78)
ANION GAP SERPL CALC-SCNC: 6 MMOL/L — SIGNIFICANT CHANGE UP (ref 5–17)
APPEARANCE UR: CLEAR — SIGNIFICANT CHANGE UP
AST SERPL-CCNC: 16 U/L — SIGNIFICANT CHANGE UP (ref 15–37)
BASOPHILS # BLD AUTO: 0.06 K/UL — SIGNIFICANT CHANGE UP (ref 0–0.2)
BASOPHILS NFR BLD AUTO: 0.7 % — SIGNIFICANT CHANGE UP (ref 0–2)
BILIRUB SERPL-MCNC: 0.3 MG/DL — SIGNIFICANT CHANGE UP (ref 0.2–1.2)
BILIRUB UR-MCNC: NEGATIVE — SIGNIFICANT CHANGE UP
BLD GP AB SCN SERPL QL: SIGNIFICANT CHANGE UP
BUN SERPL-MCNC: 17 MG/DL — SIGNIFICANT CHANGE UP (ref 7–23)
CALCIUM SERPL-MCNC: 9.5 MG/DL — SIGNIFICANT CHANGE UP (ref 8.5–10.1)
CHLORIDE SERPL-SCNC: 100 MMOL/L — SIGNIFICANT CHANGE UP (ref 96–108)
CO2 SERPL-SCNC: 29 MMOL/L — SIGNIFICANT CHANGE UP (ref 22–31)
COLOR SPEC: YELLOW — SIGNIFICANT CHANGE UP
CREAT SERPL-MCNC: 1.12 MG/DL — SIGNIFICANT CHANGE UP (ref 0.5–1.3)
DIFF PNL FLD: NEGATIVE — SIGNIFICANT CHANGE UP
EOSINOPHIL # BLD AUTO: 0.17 K/UL — SIGNIFICANT CHANGE UP (ref 0–0.5)
EOSINOPHIL NFR BLD AUTO: 2.1 % — SIGNIFICANT CHANGE UP (ref 0–6)
GLUCOSE SERPL-MCNC: 179 MG/DL — HIGH (ref 70–99)
GLUCOSE UR QL: 1000 MG/DL
HCT VFR BLD CALC: 47.6 % — SIGNIFICANT CHANGE UP (ref 39–50)
HGB BLD-MCNC: 15.6 G/DL — SIGNIFICANT CHANGE UP (ref 13–17)
IMM GRANULOCYTES NFR BLD AUTO: 0.2 % — SIGNIFICANT CHANGE UP (ref 0–1.5)
KETONES UR-MCNC: ABNORMAL
LACTATE SERPL-SCNC: 1.2 MMOL/L — SIGNIFICANT CHANGE UP (ref 0.7–2)
LEUKOCYTE ESTERASE UR-ACNC: NEGATIVE — SIGNIFICANT CHANGE UP
LIDOCAIN IGE QN: 510 U/L — HIGH (ref 73–393)
LYMPHOCYTES # BLD AUTO: 4.01 K/UL — HIGH (ref 1–3.3)
LYMPHOCYTES # BLD AUTO: 50.1 % — HIGH (ref 13–44)
MCHC RBC-ENTMCNC: 29.2 PG — SIGNIFICANT CHANGE UP (ref 27–34)
MCHC RBC-ENTMCNC: 32.8 GM/DL — SIGNIFICANT CHANGE UP (ref 32–36)
MCV RBC AUTO: 89 FL — SIGNIFICANT CHANGE UP (ref 80–100)
MONOCYTES # BLD AUTO: 0.77 K/UL — SIGNIFICANT CHANGE UP (ref 0–0.9)
MONOCYTES NFR BLD AUTO: 9.6 % — SIGNIFICANT CHANGE UP (ref 2–14)
NEUTROPHILS # BLD AUTO: 2.98 K/UL — SIGNIFICANT CHANGE UP (ref 1.8–7.4)
NEUTROPHILS NFR BLD AUTO: 37.3 % — LOW (ref 43–77)
NITRITE UR-MCNC: NEGATIVE — SIGNIFICANT CHANGE UP
PH UR: 8 — SIGNIFICANT CHANGE UP (ref 5–8)
PLATELET # BLD AUTO: 239 K/UL — SIGNIFICANT CHANGE UP (ref 150–400)
POTASSIUM SERPL-MCNC: 4.5 MMOL/L — SIGNIFICANT CHANGE UP (ref 3.5–5.3)
POTASSIUM SERPL-SCNC: 4.5 MMOL/L — SIGNIFICANT CHANGE UP (ref 3.5–5.3)
PROT SERPL-MCNC: 8.1 GM/DL — SIGNIFICANT CHANGE UP (ref 6–8.3)
PROT UR-MCNC: NEGATIVE MG/DL — SIGNIFICANT CHANGE UP
RBC # BLD: 5.35 M/UL — SIGNIFICANT CHANGE UP (ref 4.2–5.8)
RBC # FLD: 13 % — SIGNIFICANT CHANGE UP (ref 10.3–14.5)
SODIUM SERPL-SCNC: 135 MMOL/L — SIGNIFICANT CHANGE UP (ref 135–145)
SP GR SPEC: 1.01 — SIGNIFICANT CHANGE UP (ref 1.01–1.02)
TROPONIN I SERPL-MCNC: <0.015 NG/ML — SIGNIFICANT CHANGE UP (ref 0.01–0.04)
TYPE + AB SCN PNL BLD: SIGNIFICANT CHANGE UP
UROBILINOGEN FLD QL: NEGATIVE MG/DL — SIGNIFICANT CHANGE UP
WBC # BLD: 8.01 K/UL — SIGNIFICANT CHANGE UP (ref 3.8–10.5)
WBC # FLD AUTO: 8.01 K/UL — SIGNIFICANT CHANGE UP (ref 3.8–10.5)

## 2019-05-14 PROCEDURE — 99285 EMERGENCY DEPT VISIT HI MDM: CPT

## 2019-05-14 PROCEDURE — 93010 ELECTROCARDIOGRAM REPORT: CPT

## 2019-05-14 RX ORDER — SODIUM CHLORIDE 9 MG/ML
1000 INJECTION INTRAMUSCULAR; INTRAVENOUS; SUBCUTANEOUS
Refills: 0 | Status: DISCONTINUED | OUTPATIENT
Start: 2019-05-14 | End: 2019-05-17

## 2019-05-14 RX ORDER — MORPHINE SULFATE 50 MG/1
4 CAPSULE, EXTENDED RELEASE ORAL ONCE
Refills: 0 | Status: DISCONTINUED | OUTPATIENT
Start: 2019-05-14 | End: 2019-05-14

## 2019-05-14 RX ORDER — SODIUM CHLORIDE 9 MG/ML
3 INJECTION INTRAMUSCULAR; INTRAVENOUS; SUBCUTANEOUS ONCE
Refills: 0 | Status: COMPLETED | OUTPATIENT
Start: 2019-05-14 | End: 2019-05-14

## 2019-05-14 RX ORDER — ONDANSETRON 8 MG/1
4 TABLET, FILM COATED ORAL ONCE
Refills: 0 | Status: COMPLETED | OUTPATIENT
Start: 2019-05-14 | End: 2019-05-14

## 2019-05-14 RX ORDER — SODIUM CHLORIDE 9 MG/ML
1000 INJECTION INTRAMUSCULAR; INTRAVENOUS; SUBCUTANEOUS ONCE
Refills: 0 | Status: COMPLETED | OUTPATIENT
Start: 2019-05-14 | End: 2019-05-14

## 2019-05-14 RX ADMIN — SODIUM CHLORIDE 125 MILLILITER(S): 9 INJECTION INTRAMUSCULAR; INTRAVENOUS; SUBCUTANEOUS at 23:35

## 2019-05-14 RX ADMIN — SODIUM CHLORIDE 1000 MILLILITER(S): 9 INJECTION INTRAMUSCULAR; INTRAVENOUS; SUBCUTANEOUS at 23:29

## 2019-05-14 RX ADMIN — MORPHINE SULFATE 4 MILLIGRAM(S): 50 CAPSULE, EXTENDED RELEASE ORAL at 21:26

## 2019-05-14 RX ADMIN — SODIUM CHLORIDE 3 MILLILITER(S): 9 INJECTION INTRAMUSCULAR; INTRAVENOUS; SUBCUTANEOUS at 23:29

## 2019-05-14 RX ADMIN — MORPHINE SULFATE 4 MILLIGRAM(S): 50 CAPSULE, EXTENDED RELEASE ORAL at 21:56

## 2019-05-14 RX ADMIN — ONDANSETRON 4 MILLIGRAM(S): 8 TABLET, FILM COATED ORAL at 21:26

## 2019-05-14 RX ADMIN — MORPHINE SULFATE 4 MILLIGRAM(S): 50 CAPSULE, EXTENDED RELEASE ORAL at 23:35

## 2019-05-14 RX ADMIN — SODIUM CHLORIDE 1000 MILLILITER(S): 9 INJECTION INTRAMUSCULAR; INTRAVENOUS; SUBCUTANEOUS at 21:26

## 2019-05-14 NOTE — ED ADULT TRIAGE NOTE - CHIEF COMPLAINT QUOTE
severe chest pain since saturday. reports pain comes and goes, worse now than before. seen by PMD dr hirsch earlier today.

## 2019-05-14 NOTE — ED PROVIDER NOTE - OBJECTIVE STATEMENT
70 y/o male with a PMHx of DM, HTN, pancreatitis presents to the ED c/o upper abdominal pain radiating around to the back and hips. Pt was recently here on 5/12 for same symptoms and had CT with findings of pancreatis, blood work showed elevated lipase. Pt reports he began experiencing worsening symptoms after being discharged, now returning to ED. Denies NV, fevers. GI Dr. Mann PCP Dr. Welch

## 2019-05-14 NOTE — ED ADULT NURSE NOTE - OBJECTIVE STATEMENT
Pt presents to ED for RUQ abdominal pain X 3 days. Pt states he was seen in  ED for same symptoms. Pt has hx of cholecystectomy. Denies any n/v/d. Pt states pain is exacerbated by eating and drinking.

## 2019-05-14 NOTE — ED PROVIDER NOTE - CLINICAL SUMMARY MEDICAL DECISION MAKING FREE TEXT BOX
Pt with recurrent pancreatitis.  Pain not controlled at home.  2nd ED visit.  Given IV fluids, pain meds.  Admit to medicine service for GI evaluation.

## 2019-05-14 NOTE — ED STATDOCS - PROGRESS NOTE DETAILS
Renan EDWARD for Dr. Garcia: 70 y/o male with a PMHx of DM, HTN, pancreatitis presents to the ED c/o upper abdominal pain radiating around to the back. Pt notes he recently here on 5/12 for same symptoms and had CT with findings of pancreatis, blood work showed elevated pancreatic enzymes. Will send pt to main ED for further evaluation.

## 2019-05-15 LAB
ALBUMIN SERPL ELPH-MCNC: 3.1 G/DL — LOW (ref 3.3–5)
ALP SERPL-CCNC: 83 U/L — SIGNIFICANT CHANGE UP (ref 40–120)
ALT FLD-CCNC: 29 U/L — SIGNIFICANT CHANGE UP (ref 12–78)
ANION GAP SERPL CALC-SCNC: 3 MMOL/L — LOW (ref 5–17)
AST SERPL-CCNC: 15 U/L — SIGNIFICANT CHANGE UP (ref 15–37)
BASOPHILS # BLD AUTO: 0.04 K/UL — SIGNIFICANT CHANGE UP (ref 0–0.2)
BASOPHILS NFR BLD AUTO: 0.6 % — SIGNIFICANT CHANGE UP (ref 0–2)
BILIRUB SERPL-MCNC: 0.4 MG/DL — SIGNIFICANT CHANGE UP (ref 0.2–1.2)
BUN SERPL-MCNC: 13 MG/DL — SIGNIFICANT CHANGE UP (ref 7–23)
CALCIUM SERPL-MCNC: 8.3 MG/DL — LOW (ref 8.5–10.1)
CHLORIDE SERPL-SCNC: 104 MMOL/L — SIGNIFICANT CHANGE UP (ref 96–108)
CHOLEST SERPL-MCNC: 134 MG/DL — SIGNIFICANT CHANGE UP (ref 10–199)
CO2 SERPL-SCNC: 29 MMOL/L — SIGNIFICANT CHANGE UP (ref 22–31)
CREAT SERPL-MCNC: 1.08 MG/DL — SIGNIFICANT CHANGE UP (ref 0.5–1.3)
EOSINOPHIL # BLD AUTO: 0.18 K/UL — SIGNIFICANT CHANGE UP (ref 0–0.5)
EOSINOPHIL NFR BLD AUTO: 2.6 % — SIGNIFICANT CHANGE UP (ref 0–6)
GLUCOSE BLDC GLUCOMTR-MCNC: 103 MG/DL — HIGH (ref 70–99)
GLUCOSE BLDC GLUCOMTR-MCNC: 109 MG/DL — HIGH (ref 70–99)
GLUCOSE BLDC GLUCOMTR-MCNC: 142 MG/DL — HIGH (ref 70–99)
GLUCOSE SERPL-MCNC: 134 MG/DL — HIGH (ref 70–99)
HBA1C BLD-MCNC: 10.2 % — HIGH (ref 4–5.6)
HCT VFR BLD CALC: 43.3 % — SIGNIFICANT CHANGE UP (ref 39–50)
HDLC SERPL-MCNC: 35 MG/DL — LOW
HGB BLD-MCNC: 14 G/DL — SIGNIFICANT CHANGE UP (ref 13–17)
IMM GRANULOCYTES NFR BLD AUTO: 0.3 % — SIGNIFICANT CHANGE UP (ref 0–1.5)
LIPID PNL WITH DIRECT LDL SERPL: 81 MG/DL — SIGNIFICANT CHANGE UP
LYMPHOCYTES # BLD AUTO: 3.07 K/UL — SIGNIFICANT CHANGE UP (ref 1–3.3)
LYMPHOCYTES # BLD AUTO: 43.9 % — SIGNIFICANT CHANGE UP (ref 13–44)
MCHC RBC-ENTMCNC: 29.3 PG — SIGNIFICANT CHANGE UP (ref 27–34)
MCHC RBC-ENTMCNC: 32.3 GM/DL — SIGNIFICANT CHANGE UP (ref 32–36)
MCV RBC AUTO: 90.6 FL — SIGNIFICANT CHANGE UP (ref 80–100)
MONOCYTES # BLD AUTO: 0.7 K/UL — SIGNIFICANT CHANGE UP (ref 0–0.9)
MONOCYTES NFR BLD AUTO: 10 % — SIGNIFICANT CHANGE UP (ref 2–14)
NEUTROPHILS # BLD AUTO: 2.98 K/UL — SIGNIFICANT CHANGE UP (ref 1.8–7.4)
NEUTROPHILS NFR BLD AUTO: 42.6 % — LOW (ref 43–77)
PLATELET # BLD AUTO: 215 K/UL — SIGNIFICANT CHANGE UP (ref 150–400)
POTASSIUM SERPL-MCNC: 4.8 MMOL/L — SIGNIFICANT CHANGE UP (ref 3.5–5.3)
POTASSIUM SERPL-SCNC: 4.8 MMOL/L — SIGNIFICANT CHANGE UP (ref 3.5–5.3)
PROT SERPL-MCNC: 6.8 GM/DL — SIGNIFICANT CHANGE UP (ref 6–8.3)
RBC # BLD: 4.78 M/UL — SIGNIFICANT CHANGE UP (ref 4.2–5.8)
RBC # FLD: 13.2 % — SIGNIFICANT CHANGE UP (ref 10.3–14.5)
SODIUM SERPL-SCNC: 136 MMOL/L — SIGNIFICANT CHANGE UP (ref 135–145)
TOTAL CHOLESTEROL/HDL RATIO MEASUREMENT: 3.8 RATIO — SIGNIFICANT CHANGE UP (ref 3.4–9.6)
TRIGL SERPL-MCNC: 91 MG/DL — SIGNIFICANT CHANGE UP (ref 10–149)
WBC # BLD: 6.99 K/UL — SIGNIFICANT CHANGE UP (ref 3.8–10.5)
WBC # FLD AUTO: 6.99 K/UL — SIGNIFICANT CHANGE UP (ref 3.8–10.5)

## 2019-05-15 RX ORDER — INSULIN LISPRO 100/ML
VIAL (ML) SUBCUTANEOUS EVERY 6 HOURS
Refills: 0 | Status: DISCONTINUED | OUTPATIENT
Start: 2019-05-15 | End: 2019-05-16

## 2019-05-15 RX ORDER — ONDANSETRON 8 MG/1
4 TABLET, FILM COATED ORAL EVERY 6 HOURS
Refills: 0 | Status: DISCONTINUED | OUTPATIENT
Start: 2019-05-15 | End: 2019-05-19

## 2019-05-15 RX ORDER — AMLODIPINE BESYLATE 2.5 MG/1
5 TABLET ORAL DAILY
Refills: 0 | Status: DISCONTINUED | OUTPATIENT
Start: 2019-05-15 | End: 2019-05-19

## 2019-05-15 RX ORDER — DEXTROSE 50 % IN WATER 50 %
15 SYRINGE (ML) INTRAVENOUS ONCE
Refills: 0 | Status: DISCONTINUED | OUTPATIENT
Start: 2019-05-15 | End: 2019-05-19

## 2019-05-15 RX ORDER — LANOLIN ALCOHOL/MO/W.PET/CERES
3 CREAM (GRAM) TOPICAL ONCE
Refills: 0 | Status: COMPLETED | OUTPATIENT
Start: 2019-05-15 | End: 2019-05-15

## 2019-05-15 RX ORDER — GLUCAGON INJECTION, SOLUTION 0.5 MG/.1ML
1 INJECTION, SOLUTION SUBCUTANEOUS ONCE
Refills: 0 | Status: DISCONTINUED | OUTPATIENT
Start: 2019-05-15 | End: 2019-05-19

## 2019-05-15 RX ORDER — MORPHINE SULFATE 50 MG/1
4 CAPSULE, EXTENDED RELEASE ORAL EVERY 6 HOURS
Refills: 0 | Status: DISCONTINUED | OUTPATIENT
Start: 2019-05-15 | End: 2019-05-17

## 2019-05-15 RX ORDER — INSULIN GLARGINE 100 [IU]/ML
18 INJECTION, SOLUTION SUBCUTANEOUS
Qty: 0 | Refills: 0 | DISCHARGE

## 2019-05-15 RX ORDER — SODIUM CHLORIDE 9 MG/ML
1000 INJECTION INTRAMUSCULAR; INTRAVENOUS; SUBCUTANEOUS
Refills: 0 | Status: DISCONTINUED | OUTPATIENT
Start: 2019-05-15 | End: 2019-05-17

## 2019-05-15 RX ORDER — DEXTROSE 50 % IN WATER 50 %
25 SYRINGE (ML) INTRAVENOUS ONCE
Refills: 0 | Status: DISCONTINUED | OUTPATIENT
Start: 2019-05-15 | End: 2019-05-19

## 2019-05-15 RX ORDER — MORPHINE SULFATE 50 MG/1
4 CAPSULE, EXTENDED RELEASE ORAL ONCE
Refills: 0 | Status: DISCONTINUED | OUTPATIENT
Start: 2019-05-15 | End: 2019-05-15

## 2019-05-15 RX ORDER — DEXTROSE 50 % IN WATER 50 %
12.5 SYRINGE (ML) INTRAVENOUS ONCE
Refills: 0 | Status: DISCONTINUED | OUTPATIENT
Start: 2019-05-15 | End: 2019-05-19

## 2019-05-15 RX ORDER — SODIUM CHLORIDE 9 MG/ML
1000 INJECTION, SOLUTION INTRAVENOUS
Refills: 0 | Status: DISCONTINUED | OUTPATIENT
Start: 2019-05-15 | End: 2019-05-19

## 2019-05-15 RX ORDER — ACETAMINOPHEN 500 MG
650 TABLET ORAL EVERY 6 HOURS
Refills: 0 | Status: DISCONTINUED | OUTPATIENT
Start: 2019-05-15 | End: 2019-05-19

## 2019-05-15 RX ORDER — DOCUSATE SODIUM 100 MG
100 CAPSULE ORAL THREE TIMES A DAY
Refills: 0 | Status: DISCONTINUED | OUTPATIENT
Start: 2019-05-15 | End: 2019-05-19

## 2019-05-15 RX ADMIN — MORPHINE SULFATE 4 MILLIGRAM(S): 50 CAPSULE, EXTENDED RELEASE ORAL at 03:46

## 2019-05-15 RX ADMIN — Medication 3 MILLIGRAM(S): at 22:20

## 2019-05-15 RX ADMIN — SODIUM CHLORIDE 125 MILLILITER(S): 9 INJECTION INTRAMUSCULAR; INTRAVENOUS; SUBCUTANEOUS at 23:14

## 2019-05-15 RX ADMIN — MORPHINE SULFATE 4 MILLIGRAM(S): 50 CAPSULE, EXTENDED RELEASE ORAL at 00:05

## 2019-05-15 RX ADMIN — AMLODIPINE BESYLATE 5 MILLIGRAM(S): 2.5 TABLET ORAL at 06:26

## 2019-05-15 NOTE — H&P ADULT - NSHPPHYSICALEXAM_GEN_ALL_CORE
Vital Signs Last 24 Hrs  T(C): 36.7 (15 May 2019 02:25), Max: 36.9 (14 May 2019 19:38)  T(F): 98 (15 May 2019 02:25), Max: 98.4 (14 May 2019 19:38)  HR: 75 (15 May 2019 02:25) (65 - 76)  BP: 159/60 (15 May 2019 02:25) (134/88 - 162/102)  RR: 18 (15 May 2019 02:25) (18 - 20)  SpO2: 96% (15 May 2019 02:25) (95% - 98%)

## 2019-05-15 NOTE — H&P ADULT - ASSESSMENT
68 y/o male with PMHx of DM type 2, HLD, HTN, and recurrent Pancreatitis, s/p cholecystectomy, who presents to the ED with c/o epigastric abdominal pain x 4 days.     # Acute pancreatitis   -Admit to Medicine  -GI consult  -NPO for now  -F/u Lipid profile  -IV hydration  -Pain management  -Trend lapse     # DM type 2  -FS Humalog ss q6hrs while NPO  -Resume Lantus 30 units qhs once diet is advanced   -Check hgba1c  -Hold oral hypoglycemic agents     # HTN  -Continue with Norvasc 5 mg qd    DVT ppx  Improved score 1  Encourage ambulation     Code status  Full code 70 y/o male with PMHx of DM type 2, HLD, HTN, and recurrent Pancreatitis, s/p cholecystectomy, who presents to the ED with c/o epigastric abdominal pain x 4 days.     # Acute pancreatitis   -Admit to Medicine  -GI consult  -NPO for now  -Check Lipid profile  -IV hydration  -Pain management    # DM type 2  -FS Humalog ss q6hrs while NPO  -Resume Lantus 30 units qhs once diet is advanced   -Check hgba1c  -Hold oral hypoglycemic agents     # HTN  -Continue with Norvasc 5 mg qd    # HLD  -F/u lipid panel    DVT ppx  Improved score 1  Encourage ambulation/SCDs     Code status  Full code

## 2019-05-15 NOTE — PATIENT PROFILE ADULT - VISION (WITH CORRECTIVE LENSES IF THE PATIENT USUALLY WEARS THEM):
Normal vision: sees adequately in most situations; can see medication labels, newsprint/use glasses to read

## 2019-05-15 NOTE — PATIENT PROFILE ADULT - INFORMATION PROVIDED TO:
"SUBJECTIVE:   Jessica Helm is a 9 year old female  who presents to clinic today with mother because of:    Patient presents with:  Urinary Problem: dysuria and frequency       HPI    Jessica has been having blood in her urine since last night.  It hurts when she voids. She thinks it is her period (she hasn't started yet), but she doesn't bleed unless she urinates, so mom thinks she has a uti.   She hasn't taken any medication for it.  Jessica tends to hold her urine while she is at school.  She denies constipation.  No concern for inappropriate touch.      ROS  Review of Systems   Constitutional: Positive for activity change and appetite change. Negative for fever.   Gastrointestinal: Negative for abdominal pain, blood in stool and constipation.   Genitourinary: Positive for dysuria, frequency and hematuria. Negative for enuresis and flank pain.       PROBLEM LIST  There is no problem list on file for this patient.      MEDICATIONS    Current Outpatient Medications:      cephALEXin (KEFLEX) 250 MG/5ML suspension, Take 10 mLs (500 mg) by mouth 3 times daily for 7 days, Disp: 210 mL, Rfl: 0     ALLERGIES     Allergies   Allergen Reactions     [place] Flavor           OBJECTIVE:     /68 (BP Location: Right arm, Patient Position: Sitting, Cuff Size: Adult Regular)   Pulse 100   Temp 97.6  F (36.4  C) (Tympanic)   Resp 20   Ht 4' 7.5\" (1.41 m)   Wt 82 lb 11.2 oz (37.5 kg)   BMI 18.88 kg/m        GENERAL: Active, alert, in no acute distress.  SKIN: Clear. No significant rash, abnormal pigmentation or lesions  HEAD: Normocephalic.  EYES:  No discharge or erythema. Normal pupils and EOM.  EARS: Normal canals. Tympanic membranes are normal; gray and translucent.  NOSE: Normal without discharge.  MOUTH/THROAT: Clear. No oral lesions. Teeth intact without obvious abnormalities.  NECK: Supple, no masses.  LYMPH NODES: No adenopathy  LUNGS: Clear. No rales, rhonchi, wheezing or retractions  HEART: Regular rhythm. " Normal S1/S2. No murmurs.  ABDOMEN: Soft, non-tender, not distended, no masses or hepatosplenomegaly. Bowel sounds normal.   : Helio 1, irritation around the urethra, normal hymen, no evidence of trauma    DIAGNOSTICS:   Results for orders placed or performed in visit on 01/24/19 (from the past 24 hour(s))   Urinalysis w Reflex Microscopic If Positive   Result Value Ref Range    Color Urine Red     Appearance Urine Cloudy     Glucose Urine 100 (A) NEG^Negative mg/dL    Bilirubin Urine Negative NEG^Negative    Ketones Urine Trace (A) NEG^Negative mg/dL    Specific Gravity Urine 1.020 1.000 - 1.030    Blood Urine Large (A) NEG^Negative    pH Urine 8.5 5.0 - 9.0 pH    Protein Albumin Urine >299 (A) NEG^Negative mg/dL    Urobilinogen Urine 1.0 0.2 - 1.0 EU/dL    Nitrite Urine Positive (A) NEG^Negative    Leukocyte Esterase Urine Small (A) NEG^Negative    Source Midstream Urine    Urine Microscopic   Result Value Ref Range    WBC Urine 25-50 (A) OTO5^0 - 5 /HPF    RBC Urine  (A) OTO2^O - 2 /HPF       ASSESSMENT/PLAN:       ICD-10-CM    1. Acute cystitis with hematuria N30.01 cephALEXin (KEFLEX) 250 MG/5ML suspension   2. Dysuria R30.0 Urine Culture Aerobic Bacterial     Urinalysis w Reflex Microscopic If Positive     Urine Microscopic      We will treat with keflex pending urine culture.  Supportive care was recommended and reviewed.        FOLLOW UP: If not improving or if worsening    Elizabeth Brandt MD       patient

## 2019-05-15 NOTE — PROVIDER CONTACT NOTE (OTHER) - NAME OF MD/NP/PA/DO NOTIFIED:
JOHAN SPOKE WITH LUIS FROM MD'S OFFICE.
FAWAD HERRING, SPOKE WITH BECKIE FROM MD'S OFFICE.  WILL INFORM PCP , PATIENT WAS ADMITTED TO .

## 2019-05-15 NOTE — ED ADULT NURSE REASSESSMENT NOTE - NS ED NURSE REASSESS COMMENT FT1
Received report from Anna Marie LARSON. Received pt awake A&Ox3 sitting erect in bed. Pt admitted for pancreatitis, pending transfer to floor. No acute distress noted. Denies abd pain at this time. NPO. VSS. Safety/fall precautions.

## 2019-05-15 NOTE — PROGRESS NOTE ADULT - SUBJECTIVE AND OBJECTIVE BOX
70 y/o male with PMHx of DM type 2, HLD, HTN, and recurrent Pancreatitis, s/p cholecystectomy, who presents to the ED with c/o epigastric abdominal pain x 4 days. Reports sharp epigastric pain, with severity of 7/10,  radiating to RUQ and right ribs area that aggravated with food. Patient was seen in ED on 5/12/19, CT abdomen showed acute pancreatitis and was discharge home to f/u with PMD. Pt states that he was unable to get appointment with PMD and pain increased therefore in returned to the ED.  Denies fever, chills, nausea, vomiting, diarrhea or constipation. Denies ETOH use.     ED course  Lipase 510, urine glucose >1000  S/p 1 L NS bolus.     Interval History: Patient evaluated at bedside. Still with significant abdominal pain. Denies nausea, vomiting, ETOH use. Recently started tradjenta 3 months ago. Pending GI evaluation.    REVIEW OF SYSTEMS:    CONSTITUTIONAL: No weakness, fevers or chills  EYES/ENT: No visual changes;  No vertigo or throat pain   NECK: No pain or stiffness  RESPIRATORY: No cough, wheezing, hemoptysis; No shortness of breath  CARDIOVASCULAR: No chest pain or palpitations  GASTROINTESTINAL:+abdominal / epigastric pain. No nausea, vomiting, or hematemesis; No diarrhea or constipation. No melena or hematochezia.  GENITOURINARY: No dysuria, frequency or hematuria  NEUROLOGICAL: No numbness or weakness  SKIN: No itching, burning, rashes, or lesions   All other review of systems is negative unless indicated above.    MEDICATIONS  (STANDING):  amLODIPine   Tablet 5 milliGRAM(s) Oral daily  dextrose 5%. 1000 milliLiter(s) (50 mL/Hr) IV Continuous <Continuous>  dextrose 50% Injectable 12.5 Gram(s) IV Push once  dextrose 50% Injectable 25 Gram(s) IV Push once  dextrose 50% Injectable 25 Gram(s) IV Push once  insulin lispro (HumaLOG) corrective regimen sliding scale   SubCutaneous every 6 hours  sodium chloride 0.9%. 1000 milliLiter(s) (125 mL/Hr) IV Continuous <Continuous>  sodium chloride 0.9%. 1000 milliLiter(s) (125 mL/Hr) IV Continuous <Continuous>    MEDICATIONS  (PRN):  acetaminophen   Tablet .. 650 milliGRAM(s) Oral every 6 hours PRN Temp greater or equal to 38C (100.4F), Mild Pain (1 - 3)  dextrose 40% Gel 15 Gram(s) Oral once PRN Blood Glucose LESS THAN 70 milliGRAM(s)/deciliter  docusate sodium 100 milliGRAM(s) Oral three times a day PRN Constipation  glucagon  Injectable 1 milliGRAM(s) IntraMuscular once PRN Glucose LESS THAN 70 milligrams/deciliter  morphine  - Injectable 4 milliGRAM(s) IV Push every 6 hours PRN Severe Pain (7 - 10)  ondansetron Injectable 4 milliGRAM(s) IV Push every 6 hours PRN Nausea and/or Vomiting    Vital Signs (24 Hrs):  T(C): 36.8 (05-15-19 @ 17:17), Max: 37.2 (05-15-19 @ 06:11)  HR: 72 (05-15-19 @ 17:17) (62 - 76)  BP: 120/63 (05-15-19 @ 17:17) (120/63 - 162/102)  RR: 18 (05-15-19 @ 17:17) (16 - 20)  SpO2: 98% (05-15-19 @ 17:17) (95% - 99%)  Wt(kg): --  Daily Height in cm: 167.64 (14 May 2019 19:37)    Daily     I&O's Summary    14 May 2019 07:01  -  15 May 2019 07:00  --------------------------------------------------------  IN: 125 mL / OUT: 0 mL / NET: 125 mL      PHYSICAL EXAM:  GENERAL: NAD, well-developed, comfortable  HEAD:  Atraumatic, Normocephalic  EYES: EOMI, PERRLA, conjunctiva and sclera clear  NECK: Supple, No JVD  CHEST/LUNG: Clear to auscultation bilaterally; No wheeze, ronchi or rales  HEART: Regular rate and rhythm; No murmurs, rubs, or gallops  ABDOMEN: Soft, + epigastric tenderness, +distension; Bowel sounds present; no guarding or rebound  EXTREMITIES:  2+ Peripheral Pulses, No clubbing, cyanosis, or edema  PSYCH: AAOx3  NEUROLOGY: non-focal  SKIN: No rashes or lesions    LABS:                        14.0   6.99  )-----------( 215      ( 15 May 2019 07:30 )             43.3     15 May 2019 07:30    136    |  104    |  13     ----------------------------<  134    4.8     |  29     |  1.08     Ca    8.3        15 May 2019 07:30    TPro  6.8    /  Alb  3.1    /  TBili  0.4    /  DBili  x      /  AST  15     /  ALT  29     /  AlkPhos  83     15 May 2019 07:30    Lipase, Serum (05.14.19 @ 21:18)    Lipase, Serum: 510 U/L    Lipid Profile in AM (05.15.19 @ 07:30)    Total Cholesterol/HDL Ratio Measurement: 3.8 RATIO    Cholesterol, Serum: 134 mg/dL    Triglycerides, Serum: 91 mg/dL    HDL Cholesterol, Serum: 35: HDL Levels >/= 60 mg/dL are considered beneficial and a "negative" risk  factor.  Effective 08/15/2018: New reference range and interpretive comment. mg/dL    Direct LDL: 81: LDL Cholesterol (mg/dL) --- Interpretive Comment (for adults 18 and over)  Optimal LDL Level may vary based on clinical situation  Below 70                   Ideal for people at very high risk of heart  disease  Below 100                  Ideal for people at risk of heart disease  100 - 129                    [place]  130 - 159                    Borderline high  160 - 189                    High  190 and Above           Very high mg/dL    < from: CT Abdomen and Pelvis w/ IV Cont (05.12.19 @ 13:28) >  IMPRESSION:     Mild infiltration of the fat in the region of the pancreatic head, likely   pancreatitis; correlation is recommended with pancreatic enzymes.    < end of copied text >

## 2019-05-15 NOTE — PROGRESS NOTE ADULT - SUBJECTIVE AND OBJECTIVE BOX
Pt has been seen and examined with FP resident, resident supervised agree with a/p       Patient is a 69y old  Male who presents with a chief complaint of Abdominal pain (15 May 2019 03:51)        HPI:  68 y/o male with PMHx of DM type 2, HLD, HTN, and recurrent Pancreatitis, s/p cholecystectomy, who presents to the ED with c/o epigastric abdominal pain x 4 days. Reports sharp epigastric pain, with severity of 7/10,  radiating to RUQ and right ribs area that aggravated with food            PHYSICAL EXAM:  Vital Signs Last 24 Hrs  T(C): 36.9 (15 May 2019 11:02), Max: 37.2 (15 May 2019 06:11)  T(F): 98.5 (15 May 2019 11:02), Max: 98.9 (15 May 2019 06:11)  HR: 68 (15 May 2019 11:02) (62 - 76)  BP: 141/75 (15 May 2019 11:02) (134/88 - 162/102)  BP(mean): --  RR: 18 (15 May 2019 11:02) (16 - 20)  SpO2: 99% (15 May 2019 11:02) (95% - 99%)  general- comfortable   -rs-aeeb,cta  -cvs-s1s2 normal   -p/a- tender, bs+  -extremity- no asymmetrical swelling noted   -cns- non focal         A/P    #Pancreatitis- iv fluids pain control, gi evaluation     #Discussed with pt and all questions have been answered

## 2019-05-15 NOTE — H&P ADULT - ATTENDING COMMENTS
Patient seen and examined after initial evaluation above by ANA Mclain. Case discussed and reviewed in detail. Please note my plan below      68 y/o M with PMH of DM2, HTN, dyslipidemia, recurrent pancreatitis, p/w abodominal pain    *Pancreatitis  -IVF  -NPO  -Lipid panel   -Pain control     *DM2  -Humalog ISS    *HTN / dyslipidemia  -C/w home meds     *DVT ppx  -SCDs

## 2019-05-15 NOTE — PROGRESS NOTE ADULT - ASSESSMENT
68 y/o male with PMHx of DM type 2, HLD, HTN, and recurrent Pancreatitis, s/p cholecystectomy, who presents to the ED with c/o epigastric abdominal pain x 4 days.     #Acute pancreatitis   - GI consult pending  - NPO for now  - Lipid profile WNL  - continue IV hydration  - pain management  - possibly medication induced (tradjenta)    #DM type 2  - ISS  - Resume Lantus 30 units qhs once diet is advanced   - HbA1c 10.2  - Hold oral hypoglycemic agents     #HTN  - continue amlodipine     #DVT ppx  - SCDs  - ambulation as tolerated    #Advance directives  - Full code

## 2019-05-16 LAB
ANION GAP SERPL CALC-SCNC: 9 MMOL/L — SIGNIFICANT CHANGE UP (ref 5–17)
BUN SERPL-MCNC: 14 MG/DL — SIGNIFICANT CHANGE UP (ref 7–23)
CALCIUM SERPL-MCNC: 8.5 MG/DL — SIGNIFICANT CHANGE UP (ref 8.5–10.1)
CHLORIDE SERPL-SCNC: 105 MMOL/L — SIGNIFICANT CHANGE UP (ref 96–108)
CO2 SERPL-SCNC: 22 MMOL/L — SIGNIFICANT CHANGE UP (ref 22–31)
CREAT SERPL-MCNC: 0.94 MG/DL — SIGNIFICANT CHANGE UP (ref 0.5–1.3)
GLUCOSE BLDC GLUCOMTR-MCNC: 131 MG/DL — HIGH (ref 70–99)
GLUCOSE BLDC GLUCOMTR-MCNC: 157 MG/DL — HIGH (ref 70–99)
GLUCOSE BLDC GLUCOMTR-MCNC: 95 MG/DL — SIGNIFICANT CHANGE UP (ref 70–99)
GLUCOSE SERPL-MCNC: 93 MG/DL — SIGNIFICANT CHANGE UP (ref 70–99)
HCT VFR BLD CALC: 43.6 % — SIGNIFICANT CHANGE UP (ref 39–50)
HGB BLD-MCNC: 13.8 G/DL — SIGNIFICANT CHANGE UP (ref 13–17)
MCHC RBC-ENTMCNC: 28.8 PG — SIGNIFICANT CHANGE UP (ref 27–34)
MCHC RBC-ENTMCNC: 31.7 GM/DL — LOW (ref 32–36)
MCV RBC AUTO: 90.8 FL — SIGNIFICANT CHANGE UP (ref 80–100)
PLATELET # BLD AUTO: 207 K/UL — SIGNIFICANT CHANGE UP (ref 150–400)
POTASSIUM SERPL-MCNC: 4.4 MMOL/L — SIGNIFICANT CHANGE UP (ref 3.5–5.3)
POTASSIUM SERPL-SCNC: 4.4 MMOL/L — SIGNIFICANT CHANGE UP (ref 3.5–5.3)
RBC # BLD: 4.8 M/UL — SIGNIFICANT CHANGE UP (ref 4.2–5.8)
RBC # FLD: 13.1 % — SIGNIFICANT CHANGE UP (ref 10.3–14.5)
SODIUM SERPL-SCNC: 136 MMOL/L — SIGNIFICANT CHANGE UP (ref 135–145)
WBC # BLD: 6.09 K/UL — SIGNIFICANT CHANGE UP (ref 3.8–10.5)
WBC # FLD AUTO: 6.09 K/UL — SIGNIFICANT CHANGE UP (ref 3.8–10.5)

## 2019-05-16 RX ORDER — INSULIN LISPRO 100/ML
VIAL (ML) SUBCUTANEOUS AT BEDTIME
Refills: 0 | Status: DISCONTINUED | OUTPATIENT
Start: 2019-05-16 | End: 2019-05-19

## 2019-05-16 RX ORDER — MORPHINE SULFATE 50 MG/1
1 CAPSULE, EXTENDED RELEASE ORAL ONCE
Refills: 0 | Status: DISCONTINUED | OUTPATIENT
Start: 2019-05-16 | End: 2019-05-16

## 2019-05-16 RX ORDER — LINAGLIPTIN 5 MG/1
1 TABLET, FILM COATED ORAL
Qty: 0 | Refills: 0 | DISCHARGE

## 2019-05-16 RX ORDER — INSULIN LISPRO 100/ML
VIAL (ML) SUBCUTANEOUS
Refills: 0 | Status: DISCONTINUED | OUTPATIENT
Start: 2019-05-16 | End: 2019-05-19

## 2019-05-16 RX ORDER — LANOLIN ALCOHOL/MO/W.PET/CERES
5 CREAM (GRAM) TOPICAL ONCE
Refills: 0 | Status: COMPLETED | OUTPATIENT
Start: 2019-05-16 | End: 2019-05-16

## 2019-05-16 RX ORDER — PANTOPRAZOLE SODIUM 20 MG/1
40 TABLET, DELAYED RELEASE ORAL
Refills: 0 | Status: DISCONTINUED | OUTPATIENT
Start: 2019-05-16 | End: 2019-05-19

## 2019-05-16 RX ORDER — PANTOPRAZOLE SODIUM 20 MG/1
1 TABLET, DELAYED RELEASE ORAL
Qty: 7 | Refills: 0
Start: 2019-05-16 | End: 2019-05-22

## 2019-05-16 RX ADMIN — Medication 5 MILLIGRAM(S): at 22:39

## 2019-05-16 RX ADMIN — SODIUM CHLORIDE 125 MILLILITER(S): 9 INJECTION INTRAMUSCULAR; INTRAVENOUS; SUBCUTANEOUS at 14:31

## 2019-05-16 RX ADMIN — MORPHINE SULFATE 4 MILLIGRAM(S): 50 CAPSULE, EXTENDED RELEASE ORAL at 15:26

## 2019-05-16 RX ADMIN — PANTOPRAZOLE SODIUM 40 MILLIGRAM(S): 20 TABLET, DELAYED RELEASE ORAL at 11:52

## 2019-05-16 RX ADMIN — MORPHINE SULFATE 4 MILLIGRAM(S): 50 CAPSULE, EXTENDED RELEASE ORAL at 21:10

## 2019-05-16 RX ADMIN — ONDANSETRON 4 MILLIGRAM(S): 8 TABLET, FILM COATED ORAL at 19:41

## 2019-05-16 RX ADMIN — SODIUM CHLORIDE 125 MILLILITER(S): 9 INJECTION INTRAMUSCULAR; INTRAVENOUS; SUBCUTANEOUS at 06:48

## 2019-05-16 RX ADMIN — SODIUM CHLORIDE 125 MILLILITER(S): 9 INJECTION INTRAMUSCULAR; INTRAVENOUS; SUBCUTANEOUS at 21:56

## 2019-05-16 RX ADMIN — AMLODIPINE BESYLATE 5 MILLIGRAM(S): 2.5 TABLET ORAL at 05:21

## 2019-05-16 RX ADMIN — MORPHINE SULFATE 4 MILLIGRAM(S): 50 CAPSULE, EXTENDED RELEASE ORAL at 20:39

## 2019-05-16 RX ADMIN — MORPHINE SULFATE 4 MILLIGRAM(S): 50 CAPSULE, EXTENDED RELEASE ORAL at 14:49

## 2019-05-16 RX ADMIN — Medication 2: at 11:50

## 2019-05-16 NOTE — PROGRESS NOTE ADULT - ASSESSMENT
68 y/o male with PMHx of DM type 2, HLD, HTN, and recurrent Pancreatitis, s/p cholecystectomy, who presents to the ED with c/o epigastric abdominal pain x 4 days.     #Acute pancreatitis   - full liquid diet  - Lipid profile WNL  - continue IV hydration  - CT abdomen as above  - pain management  - possibly medication induced (tradjenta)  - GI consult appreciated    #DM type 2  - ISS  - On Lantus 30 units qhs at home  - HbA1c 10.2  - Hold oral hypoglycemic agents     #HTN  - continue amlodipine     #DVT ppx  - SCDs  - ambulation as tolerated    #Advance directives  - Full code

## 2019-05-16 NOTE — PROGRESS NOTE ADULT - SUBJECTIVE AND OBJECTIVE BOX
68 y/o male with PMHx of DM type 2, HLD, HTN, and recurrent Pancreatitis, s/p cholecystectomy, who presents to the ED with c/o epigastric abdominal pain x 4 days. Reports sharp epigastric pain, with severity of 7/10,  radiating to RUQ and right ribs area that aggravated with food. Patient was seen in ED on 5/12/19, CT abdomen showed acute pancreatitis and was discharge home to f/u with PMD. Pt states that he was unable to get appointment with PMD and pain increased therefore in returned to the ED.  Denies fever, chills, nausea, vomiting, diarrhea or constipation. Denies ETOH use.     ED course  Lipase 510, urine glucose >1000  S/p 1 L NS bolus.     Interval History: Patient evaluated at bedside. Pain improved, but aggravated when regular diet started. Will continue full liquid diet for now. Spoke with outpatient endocrinologist who recommends discontinuing tradjenta, and to discharge with 5 units of humalog with meals in addition to his other DM medications. Denies nausea, vomiting.    REVIEW OF SYSTEMS:    CONSTITUTIONAL: No weakness, fevers or chills  EYES/ENT: No visual changes;  No vertigo or throat pain   NECK: No pain or stiffness  RESPIRATORY: No cough, wheezing, hemoptysis; No shortness of breath  CARDIOVASCULAR: No chest pain or palpitations  GASTROINTESTINAL: +abdominal / epigastric pain. No nausea, vomiting, or hematemesis; No diarrhea or constipation. No melena or hematochezia.  GENITOURINARY: No dysuria, frequency or hematuria  NEUROLOGICAL: No numbness or weakness  SKIN: No itching, burning, rashes, or lesions   All other review of systems is negative unless indicated above.    MEDICATIONS  (STANDING):  amLODIPine   Tablet 5 milliGRAM(s) Oral daily  dextrose 5%. 1000 milliLiter(s) (50 mL/Hr) IV Continuous <Continuous>  dextrose 50% Injectable 12.5 Gram(s) IV Push once  dextrose 50% Injectable 25 Gram(s) IV Push once  dextrose 50% Injectable 25 Gram(s) IV Push once  insulin lispro (HumaLOG) corrective regimen sliding scale   SubCutaneous three times a day before meals  insulin lispro (HumaLOG) corrective regimen sliding scale   SubCutaneous at bedtime  pantoprazole    Tablet 40 milliGRAM(s) Oral before breakfast  sodium chloride 0.9%. 1000 milliLiter(s) (125 mL/Hr) IV Continuous <Continuous>  sodium chloride 0.9%. 1000 milliLiter(s) (125 mL/Hr) IV Continuous <Continuous>    MEDICATIONS  (PRN):  acetaminophen   Tablet .. 650 milliGRAM(s) Oral every 6 hours PRN Temp greater or equal to 38C (100.4F), Mild Pain (1 - 3)  dextrose 40% Gel 15 Gram(s) Oral once PRN Blood Glucose LESS THAN 70 milliGRAM(s)/deciliter  docusate sodium 100 milliGRAM(s) Oral three times a day PRN Constipation  glucagon  Injectable 1 milliGRAM(s) IntraMuscular once PRN Glucose LESS THAN 70 milligrams/deciliter  morphine  - Injectable 4 milliGRAM(s) IV Push every 6 hours PRN Severe Pain (7 - 10)  ondansetron Injectable 4 milliGRAM(s) IV Push every 6 hours PRN Nausea and/or Vomiting      Vital Signs (24 Hrs):  T(C): 37.2 (05-16-19 @ 14:24), Max: 37.4 (05-16-19 @ 10:43)  HR: 75 (05-16-19 @ 14:24) (68 - 79)  BP: 146/77 (05-16-19 @ 14:24) (120/63 - 146/77)  RR: 16 (05-16-19 @ 14:24) (16 - 18)  SpO2: 99% (05-16-19 @ 14:24) (98% - 100%)  Wt(kg): --  Daily     Daily     I&O's Summary    15 May 2019 07:01  -  16 May 2019 07:00  --------------------------------------------------------  IN: 1360 mL / OUT: 0 mL / NET: 1360 mL      PHYSICAL EXAM:  GENERAL: NAD, well-developed, comfortable  HEAD:  Atraumatic, Normocephalic  EYES: EOMI, PERRLA, conjunctiva and sclera clear  NECK: Supple, No JVD  CHEST/LUNG: Clear to auscultation bilaterally; No wheeze, ronchi or rales  HEART: Regular rate and rhythm; No murmurs, rubs, or gallops  ABDOMEN: Soft non tender, +distension; Bowel sounds present; no guarding or rebound  EXTREMITIES:  2+ Peripheral Pulses, No clubbing, cyanosis, or edema  PSYCH: AAOx3  NEUROLOGY: non-focal  SKIN: No rashes or lesions    LABS:                        13.8   6.09  )-----------( 207      ( 16 May 2019 07:00 )             43.6     16 May 2019 07:00    136    |  105    |  14     ----------------------------<  93     4.4     |  22     |  0.94     Ca    8.5        16 May 2019 07:00          Lipase, Serum (05.14.19 @ 21:18)    Lipase, Serum: 510 U/L    Lipid Profile in AM (05.15.19 @ 07:30)    Total Cholesterol/HDL Ratio Measurement: 3.8 RATIO    Cholesterol, Serum: 134 mg/dL    Triglycerides, Serum: 91 mg/dL    HDL Cholesterol, Serum: 35: HDL Levels >/= 60 mg/dL are considered beneficial and a "negative" risk  factor.  Effective 08/15/2018: New reference range and interpretive comment. mg/dL    Direct LDL: 81: LDL Cholesterol (mg/dL) --- Interpretive Comment (for adults 18 and over)  Optimal LDL Level may vary based on clinical situation  Below 70                   Ideal for people at very high risk of heart  disease  Below 100                  Ideal for people at risk of heart disease  100 - 129                    [place]  130 - 159                    Borderline high  160 - 189                    High  190 and Above           Very high mg/dL    Hemoglobin A1C, Whole Blood: 10.2: Method: Immunoassay       Reference Range                4.0-5.6%       High risk (prediabetic)        5.7-6.4%       Diabetic, diagnostic             >=6.5%       ADA diabetic treatment goal       <7.0%  The Hemoglobin A1c testing is NGSP-certified.Reference ranges are based  upon the 2010 recommendations of  the American Diabetes Association.  Interpretation may vary for children  and adolescents. % (05.15.19 @ 07:30)        < from: CT Abdomen and Pelvis w/ IV Cont (05.12.19 @ 13:28) >  IMPRESSION:     Mild infiltration of the fat in the region of the pancreatic head, likely   pancreatitis; correlation is recommended with pancreatic enzymes.    < end of copied text >

## 2019-05-16 NOTE — CHART NOTE - NSCHARTNOTEFT_GEN_A_CORE
Called by nurse overnight about pt's IVF, whether to stop. Pt with 2 orders of NS IVF at 125c/hr, one of which says to stop after 12 hrs (started at 04:35 on 5/15), and a second continuous IVF fluid order of 125cc/hr.  - As per day hospitalist note, will continue IVF    /73, HR 77    Discussed with nurse, nurse advised to call back if pt with elevating BP.

## 2019-05-16 NOTE — CONSULT NOTE ADULT - SUBJECTIVE AND OBJECTIVE BOX
This is a 69-year-old man, patient of Dr. Mann, presenting with upper abdominal pain that started about 4 days ago, and is admitted with acute pancreatitis.  His pain started in the mid upper abdomen and radiates to the bilateral upper quadrants, it is associated with nausea but no vomiting.  He denies fevers.  There is no diarrhea or constipation and no rectal bleeding.  He has a history of pancreatitis last episode was 2 years ago.  His gallbladder was removed 7 years ago for gallstones.  He denies alcohol use and denies smoking.  He was started on Tradjenta for poorly controlled diabetes about 6 months ago, and notes his last hemoglobin A1c was about 10.  He was treated in the hospital with IV fluids and bowel rest and today began advancing his diet to full liquids but felt pain after eating full liquids.  He had a bowel movement today.    PAST MEDICAL & SURGICAL HISTORY:  DM (diabetes mellitus)  HTN (hypertension)  Pancreatitis  S/P laparoscopic cholecystectomy    Home Medications:  Farxiga 5 mg oral tablet: 1 tab(s) orally once a day (15 May 2019 03:47)  Lantus Solostar Pen 100 units/mL subcutaneous solution: 30 unit(s) subcutaneous once a day (at bedtime) (15 May 2019 03:47)  metFORMIN 1000 mg oral tablet: 1 tab(s) orally 2 times a day (15 May 2019 03:47)  Zantac: 150 milligram(s) orally once a day (15 May 2019 03:47)    MEDICATIONS  (STANDING):  amLODIPine   Tablet 5 milliGRAM(s) Oral daily  dextrose 5%. 1000 milliLiter(s) (50 mL/Hr) IV Continuous <Continuous>  dextrose 50% Injectable 12.5 Gram(s) IV Push once  dextrose 50% Injectable 25 Gram(s) IV Push once  dextrose 50% Injectable 25 Gram(s) IV Push once  insulin lispro (HumaLOG) corrective regimen sliding scale   SubCutaneous three times a day before meals  insulin lispro (HumaLOG) corrective regimen sliding scale   SubCutaneous at bedtime  pantoprazole    Tablet 40 milliGRAM(s) Oral before breakfast  sodium chloride 0.9%. 1000 milliLiter(s) (125 mL/Hr) IV Continuous <Continuous>  sodium chloride 0.9%. 1000 milliLiter(s) (125 mL/Hr) IV Continuous <Continuous>    Allergies    No Known Allergies    Intolerances    FAMILY HISTORY:  Family history of diabetes mellitus in brother    Soc: no tob/EtOH    ROS: Otherwise negative, all systems reviewed    PHYSICAL EXAM:  Vital Signs Last 24 Hrs  T(C): 37.4 (16 May 2019 10:43), Max: 37.4 (16 May 2019 10:43)  T(F): 99.3 (16 May 2019 10:43), Max: 99.3 (16 May 2019 10:43)  HR: 79 (16 May 2019 10:43) (68 - 79)  BP: 130/70 (16 May 2019 10:43) (120/63 - 136/73)  BP(mean): --  RR: 17 (16 May 2019 10:43) (17 - 18)  SpO2: 100% (16 May 2019 10:43) (98% - 100%)    Constitutional: No acute distress, alert and oriented ×3    Eyes: Anicteric    Neck: Supple, no JVD    Respiratory: Clear to auscultation bilaterally, breathing comfortably    Cardiovascular: Regular rate and rhythm, no murmur    Gastrointestinal: Soft. mild epigastric tenderness, nondistended, bowel sounds present.  No mass.  No hepatosplenomegaly.    Rectal: Deferred    Extremities: Warm, well-perfused, no edema    Neurological: Grossly intact, no focal deficits    Skin: No lesion or rash    Lymph Nodes: No cervical or supraclavicular lymphadenopathy    Psychiatric: Mood and affect seem normal                          13.8   6.09  )-----------( 207      ( 16 May 2019 07:00 )             43.6     05-16    136  |  105  |  14  ----------------------------<  93  4.4   |  22  |  0.94    Ca    8.5      16 May 2019 07:00    TPro  6.8  /  Alb  3.1<L>  /  TBili  0.4  /  DBili  x   /  AST  15  /  ALT  29  /  AlkPhos  83  05-15    lipase 510  triglycerides and calcium normal on admission  CT a/p reviewed: mild inflammatory changes around the head of the pancreas without fluid collection

## 2019-05-16 NOTE — CONSULT NOTE ADULT - ASSESSMENT
69-year-old male poorly controlled diabetic with recurrent mild idiopathic pancreatitis, possibly related to underlying diabetes or medications such as Tradjenta.  He is improving slowly with supportive care.    Recommendations:    -Continue IV fluids  -Continue clears or full liquid diet, do not advance until his pain improves  -would observe in hospital today given intolerance of full liquid diet  -As outpatient will need close follow-up with his endocrinologist to improve his control diabetes and consider alternative medications to Tradjenta  Will follow

## 2019-05-16 NOTE — CHART NOTE - NSCHARTNOTEFT_GEN_A_CORE
Called by RN, states pt complaining of pain, in abdomen, pt not yet due for next morphine dose (at 2am), last received 4mg IV morphine ~8:40pm. Pt with stable vitals.      Vital Signs Last 24 Hrs  T(C): 36.7 (16 May 2019 23:39), Max: 37.4 (16 May 2019 10:43)  T(F): 98.1 (16 May 2019 23:39), Max: 99.3 (16 May 2019 10:43)  HR: 76 (16 May 2019 23:39) (68 - 79)  BP: 132/84 (16 May 2019 23:39) (120/72 - 146/77)  RR: 20 (16 May 2019 23:39) (16 - 20)  SpO2: 97% (16 May 2019 23:39) (97% - 100%)      - Morphine 1mg IV x1 given now  - continue with morphine IV dose as scheduled

## 2019-05-17 LAB
ANION GAP SERPL CALC-SCNC: 5 MMOL/L — SIGNIFICANT CHANGE UP (ref 5–17)
BUN SERPL-MCNC: 13 MG/DL — SIGNIFICANT CHANGE UP (ref 7–23)
CALCIUM SERPL-MCNC: 8.9 MG/DL — SIGNIFICANT CHANGE UP (ref 8.5–10.1)
CHLORIDE SERPL-SCNC: 105 MMOL/L — SIGNIFICANT CHANGE UP (ref 96–108)
CO2 SERPL-SCNC: 27 MMOL/L — SIGNIFICANT CHANGE UP (ref 22–31)
CREAT SERPL-MCNC: 1.09 MG/DL — SIGNIFICANT CHANGE UP (ref 0.5–1.3)
GLUCOSE BLDC GLUCOMTR-MCNC: 134 MG/DL — HIGH (ref 70–99)
GLUCOSE BLDC GLUCOMTR-MCNC: 147 MG/DL — HIGH (ref 70–99)
GLUCOSE SERPL-MCNC: 143 MG/DL — HIGH (ref 70–99)
HCT VFR BLD CALC: 44.6 % — SIGNIFICANT CHANGE UP (ref 39–50)
HGB BLD-MCNC: 14.4 G/DL — SIGNIFICANT CHANGE UP (ref 13–17)
MCHC RBC-ENTMCNC: 28.9 PG — SIGNIFICANT CHANGE UP (ref 27–34)
MCHC RBC-ENTMCNC: 32.3 GM/DL — SIGNIFICANT CHANGE UP (ref 32–36)
MCV RBC AUTO: 89.4 FL — SIGNIFICANT CHANGE UP (ref 80–100)
PLATELET # BLD AUTO: 224 K/UL — SIGNIFICANT CHANGE UP (ref 150–400)
POTASSIUM SERPL-MCNC: 4.4 MMOL/L — SIGNIFICANT CHANGE UP (ref 3.5–5.3)
POTASSIUM SERPL-SCNC: 4.4 MMOL/L — SIGNIFICANT CHANGE UP (ref 3.5–5.3)
RBC # BLD: 4.99 M/UL — SIGNIFICANT CHANGE UP (ref 4.2–5.8)
RBC # FLD: 13.1 % — SIGNIFICANT CHANGE UP (ref 10.3–14.5)
SODIUM SERPL-SCNC: 137 MMOL/L — SIGNIFICANT CHANGE UP (ref 135–145)
WBC # BLD: 7.37 K/UL — SIGNIFICANT CHANGE UP (ref 3.8–10.5)
WBC # FLD AUTO: 7.37 K/UL — SIGNIFICANT CHANGE UP (ref 3.8–10.5)

## 2019-05-17 RX ORDER — MORPHINE SULFATE 50 MG/1
4 CAPSULE, EXTENDED RELEASE ORAL EVERY 4 HOURS
Refills: 0 | Status: DISCONTINUED | OUTPATIENT
Start: 2019-05-17 | End: 2019-05-18

## 2019-05-17 RX ORDER — SODIUM CHLORIDE 9 MG/ML
1000 INJECTION INTRAMUSCULAR; INTRAVENOUS; SUBCUTANEOUS
Refills: 0 | Status: DISCONTINUED | OUTPATIENT
Start: 2019-05-17 | End: 2019-05-18

## 2019-05-17 RX ORDER — LANOLIN ALCOHOL/MO/W.PET/CERES
3 CREAM (GRAM) TOPICAL AT BEDTIME
Refills: 0 | Status: DISCONTINUED | OUTPATIENT
Start: 2019-05-17 | End: 2019-05-19

## 2019-05-17 RX ADMIN — MORPHINE SULFATE 4 MILLIGRAM(S): 50 CAPSULE, EXTENDED RELEASE ORAL at 17:24

## 2019-05-17 RX ADMIN — MORPHINE SULFATE 4 MILLIGRAM(S): 50 CAPSULE, EXTENDED RELEASE ORAL at 12:16

## 2019-05-17 RX ADMIN — SODIUM CHLORIDE 100 MILLILITER(S): 9 INJECTION INTRAMUSCULAR; INTRAVENOUS; SUBCUTANEOUS at 20:09

## 2019-05-17 RX ADMIN — MORPHINE SULFATE 1 MILLIGRAM(S): 50 CAPSULE, EXTENDED RELEASE ORAL at 00:18

## 2019-05-17 RX ADMIN — MORPHINE SULFATE 4 MILLIGRAM(S): 50 CAPSULE, EXTENDED RELEASE ORAL at 02:51

## 2019-05-17 RX ADMIN — Medication 3 MILLIGRAM(S): at 21:18

## 2019-05-17 RX ADMIN — PANTOPRAZOLE SODIUM 40 MILLIGRAM(S): 20 TABLET, DELAYED RELEASE ORAL at 08:44

## 2019-05-17 NOTE — PROGRESS NOTE ADULT - SUBJECTIVE AND OBJECTIVE BOX
70 y/o male with PMHx of DM type 2, HLD, HTN, and recurrent Pancreatitis, s/p cholecystectomy, who presents to the ED with c/o epigastric abdominal pain x 4 days. Reports sharp epigastric pain, with severity of 7/10,  radiating to RUQ and right ribs area that aggravated with food. Patient was seen in ED on 5/12/19, CT abdomen showed acute pancreatitis and was discharge home to f/u with PMD. Pt states that he was unable to get appointment with PMD and pain increased therefore in returned to the ED.  Denies fever, chills, nausea, vomiting, diarrhea or constipation. Denies ETOH use.     ED course  Lipase 510, urine glucose >1000  S/p 1 L NS bolus.     Interval History: Patient evaluated at bedside. Still with abdominal pain, states that it keep him up at night. Unable to tolerate diet. Denies fever, chills, vomiting.     REVIEW OF SYSTEMS:    CONSTITUTIONAL: No weakness, fevers or chills  EYES/ENT: No visual changes;  No vertigo or throat pain   NECK: No pain or stiffness  RESPIRATORY: No cough, wheezing, hemoptysis; No shortness of breath  CARDIOVASCULAR: No chest pain or palpitations  GASTROINTESTINAL: +abdominal / epigastric pain. +nausea, No vomiting, or hematemesis; No diarrhea or constipation. No melena or hematochezia.  GENITOURINARY: No dysuria, frequency or hematuria  NEUROLOGICAL: No numbness or weakness  SKIN: No itching, burning, rashes, or lesions   All other review of systems is negative unless indicated above.    MEDICATIONS  (STANDING):  amLODIPine   Tablet 5 milliGRAM(s) Oral daily  dextrose 5%. 1000 milliLiter(s) (50 mL/Hr) IV Continuous <Continuous>  dextrose 50% Injectable 12.5 Gram(s) IV Push once  dextrose 50% Injectable 25 Gram(s) IV Push once  dextrose 50% Injectable 25 Gram(s) IV Push once  insulin lispro (HumaLOG) corrective regimen sliding scale   SubCutaneous three times a day before meals  insulin lispro (HumaLOG) corrective regimen sliding scale   SubCutaneous at bedtime  pantoprazole    Tablet 40 milliGRAM(s) Oral before breakfast  sodium chloride 0.9%. 1000 milliLiter(s) (100 mL/Hr) IV Continuous <Continuous>    MEDICATIONS  (PRN):  acetaminophen   Tablet .. 650 milliGRAM(s) Oral every 6 hours PRN Temp greater or equal to 38C (100.4F), Mild Pain (1 - 3)  dextrose 40% Gel 15 Gram(s) Oral once PRN Blood Glucose LESS THAN 70 milliGRAM(s)/deciliter  docusate sodium 100 milliGRAM(s) Oral three times a day PRN Constipation  glucagon  Injectable 1 milliGRAM(s) IntraMuscular once PRN Glucose LESS THAN 70 milligrams/deciliter  morphine  - Injectable 4 milliGRAM(s) IV Push every 4 hours PRN Moderate Pain (4 - 6)  ondansetron Injectable 4 milliGRAM(s) IV Push every 6 hours PRN Nausea and/or Vomiting    Vital Signs (24 Hrs):  T(C): 37.3 (05-17-19 @ 10:53), Max: 37.3 (05-17-19 @ 10:53)  HR: 67 (05-17-19 @ 10:53) (67 - 76)  BP: 137/71 (05-17-19 @ 10:53) (114/67 - 145/76)  RR: 18 (05-17-19 @ 10:53) (18 - 20)  SpO2: 98% (05-17-19 @ 10:53) (97% - 98%)  Wt(kg): --  Daily     Daily     I&O's Summary    16 May 2019 07:01  -  17 May 2019 07:00  --------------------------------------------------------  IN: 1400 mL / OUT: 450 mL / NET: 950 mL      PHYSICAL EXAM:  GENERAL: NAD, well-developed, comfortable  HEAD:  Atraumatic, Normocephalic  EYES: EOMI, PERRLA, conjunctiva and sclera clear  NECK: Supple, No JVD  CHEST/LUNG: Clear to auscultation bilaterally; No wheeze, ronchi or rales  HEART: Regular rate and rhythm; No murmurs, rubs, or gallops  ABDOMEN: Soft, non tender, +distension; Bowel sounds present; no guarding or rebound  EXTREMITIES:  2+ Peripheral Pulses, No clubbing, cyanosis, or edema  PSYCH: AAOx3  NEUROLOGY: non-focal  SKIN: No rashes or lesions    LABS:                        14.4   7.37  )-----------( 224      ( 17 May 2019 07:40 )             44.6     17 May 2019 07:40    137    |  105    |  13     ----------------------------<  143    4.4     |  27     |  1.09     Ca    8.9        17 May 2019 07:40        Lipase, Serum (05.14.19 @ 21:18)    Lipase, Serum: 510 U/L    Lipid Profile in AM (05.15.19 @ 07:30)    Total Cholesterol/HDL Ratio Measurement: 3.8 RATIO    Cholesterol, Serum: 134 mg/dL    Triglycerides, Serum: 91 mg/dL    HDL Cholesterol, Serum: 35: HDL Levels >/= 60 mg/dL are considered beneficial and a "negative" risk  factor.  Effective 08/15/2018: New reference range and interpretive comment. mg/dL    Direct LDL: 81: LDL Cholesterol (mg/dL) --- Interpretive Comment (for adults 18 and over)  Optimal LDL Level may vary based on clinical situation  Below 70                   Ideal for people at very high risk of heart  disease  Below 100                  Ideal for people at risk of heart disease  100 - 129                    [place]  130 - 159                    Borderline high  160 - 189                    High  190 and Above           Very high mg/dL    Hemoglobin A1C, Whole Blood: 10.2: Method: Immunoassay       Reference Range                4.0-5.6%       High risk (prediabetic)        5.7-6.4%       Diabetic, diagnostic             >=6.5%       ADA diabetic treatment goal       <7.0%  The Hemoglobin A1c testing is NGSP-certified.Reference ranges are based  upon the 2010 recommendations of  the American Diabetes Association.  Interpretation may vary for children  and adolescents. % (05.15.19 @ 07:30)        < from: CT Abdomen and Pelvis w/ IV Cont (05.12.19 @ 13:28) >  IMPRESSION:     Mild infiltration of the fat in the region of the pancreatic head, likely   pancreatitis; correlation is recommended with pancreatic enzymes.    < end of copied text > 68 y/o male with PMHx of DM type 2, HLD, HTN, and recurrent Pancreatitis, s/p cholecystectomy, who presents to the ED with c/o epigastric abdominal pain x 4 days. Reports sharp epigastric pain, with severity of 7/10,  radiating to RUQ and right ribs area that aggravated with food. Patient was seen in ED on 5/12/19, CT abdomen showed acute pancreatitis and was discharge home to f/u with PMD. Pt states that he was unable to get appointment with PMD and pain increased therefore in returned to the ED.  Denies fever, chills, nausea, vomiting, diarrhea or constipation. Denies ETOH use.     ED course  Lipase 510, urine glucose >1000  S/p 1 L NS bolus.     Interval History: Patient evaluated at bedside. Still with abdominal pain, states that it kept him up at night. Unable to tolerate diet. Denies fever, chills, vomiting.     REVIEW OF SYSTEMS:    CONSTITUTIONAL: No weakness, fevers or chills  EYES/ENT: No visual changes;  No vertigo or throat pain   NECK: No pain or stiffness  RESPIRATORY: No cough, wheezing, hemoptysis; No shortness of breath  CARDIOVASCULAR: No chest pain or palpitations  GASTROINTESTINAL: +abdominal / epigastric pain. +nausea, No vomiting, or hematemesis; No diarrhea or constipation. No melena or hematochezia.  GENITOURINARY: No dysuria, frequency or hematuria  NEUROLOGICAL: No numbness or weakness  SKIN: No itching, burning, rashes, or lesions   All other review of systems is negative unless indicated above.    MEDICATIONS  (STANDING):  amLODIPine   Tablet 5 milliGRAM(s) Oral daily  dextrose 5%. 1000 milliLiter(s) (50 mL/Hr) IV Continuous <Continuous>  dextrose 50% Injectable 12.5 Gram(s) IV Push once  dextrose 50% Injectable 25 Gram(s) IV Push once  dextrose 50% Injectable 25 Gram(s) IV Push once  insulin lispro (HumaLOG) corrective regimen sliding scale   SubCutaneous three times a day before meals  insulin lispro (HumaLOG) corrective regimen sliding scale   SubCutaneous at bedtime  pantoprazole    Tablet 40 milliGRAM(s) Oral before breakfast  sodium chloride 0.9%. 1000 milliLiter(s) (100 mL/Hr) IV Continuous <Continuous>    MEDICATIONS  (PRN):  acetaminophen   Tablet .. 650 milliGRAM(s) Oral every 6 hours PRN Temp greater or equal to 38C (100.4F), Mild Pain (1 - 3)  dextrose 40% Gel 15 Gram(s) Oral once PRN Blood Glucose LESS THAN 70 milliGRAM(s)/deciliter  docusate sodium 100 milliGRAM(s) Oral three times a day PRN Constipation  glucagon  Injectable 1 milliGRAM(s) IntraMuscular once PRN Glucose LESS THAN 70 milligrams/deciliter  morphine  - Injectable 4 milliGRAM(s) IV Push every 4 hours PRN Moderate Pain (4 - 6)  ondansetron Injectable 4 milliGRAM(s) IV Push every 6 hours PRN Nausea and/or Vomiting    Vital Signs (24 Hrs):  T(C): 37.3 (05-17-19 @ 10:53), Max: 37.3 (05-17-19 @ 10:53)  HR: 67 (05-17-19 @ 10:53) (67 - 76)  BP: 137/71 (05-17-19 @ 10:53) (114/67 - 145/76)  RR: 18 (05-17-19 @ 10:53) (18 - 20)  SpO2: 98% (05-17-19 @ 10:53) (97% - 98%)  Wt(kg): --  Daily     Daily     I&O's Summary    16 May 2019 07:01  -  17 May 2019 07:00  --------------------------------------------------------  IN: 1400 mL / OUT: 450 mL / NET: 950 mL      PHYSICAL EXAM:  GENERAL: NAD, well-developed, comfortable  HEAD:  Atraumatic, Normocephalic  EYES: EOMI, PERRLA, conjunctiva and sclera clear  NECK: Supple, No JVD  CHEST/LUNG: Clear to auscultation bilaterally; No wheeze, ronchi or rales  HEART: Regular rate and rhythm; No murmurs, rubs, or gallops  ABDOMEN: Soft, non tender, +distension; Bowel sounds present; no guarding or rebound  EXTREMITIES:  2+ Peripheral Pulses, No clubbing, cyanosis, or edema  PSYCH: AAOx3  NEUROLOGY: non-focal  SKIN: No rashes or lesions    LABS:                        14.4   7.37  )-----------( 224      ( 17 May 2019 07:40 )             44.6     17 May 2019 07:40    137    |  105    |  13     ----------------------------<  143    4.4     |  27     |  1.09     Ca    8.9        17 May 2019 07:40        Lipase, Serum (05.14.19 @ 21:18)    Lipase, Serum: 510 U/L    Lipid Profile in AM (05.15.19 @ 07:30)    Total Cholesterol/HDL Ratio Measurement: 3.8 RATIO    Cholesterol, Serum: 134 mg/dL    Triglycerides, Serum: 91 mg/dL    HDL Cholesterol, Serum: 35: HDL Levels >/= 60 mg/dL are considered beneficial and a "negative" risk  factor.  Effective 08/15/2018: New reference range and interpretive comment. mg/dL    Direct LDL: 81: LDL Cholesterol (mg/dL) --- Interpretive Comment (for adults 18 and over)  Optimal LDL Level may vary based on clinical situation  Below 70                   Ideal for people at very high risk of heart  disease  Below 100                  Ideal for people at risk of heart disease  100 - 129                    [place]  130 - 159                    Borderline high  160 - 189                    High  190 and Above           Very high mg/dL    Hemoglobin A1C, Whole Blood: 10.2: Method: Immunoassay       Reference Range                4.0-5.6%       High risk (prediabetic)        5.7-6.4%       Diabetic, diagnostic             >=6.5%       ADA diabetic treatment goal       <7.0%  The Hemoglobin A1c testing is NGSP-certified.Reference ranges are based  upon the 2010 recommendations of  the American Diabetes Association.  Interpretation may vary for children  and adolescents. % (05.15.19 @ 07:30)        < from: CT Abdomen and Pelvis w/ IV Cont (05.12.19 @ 13:28) >  IMPRESSION:     Mild infiltration of the fat in the region of the pancreatic head, likely   pancreatitis; correlation is recommended with pancreatic enzymes.    < end of copied text >

## 2019-05-17 NOTE — PROGRESS NOTE ADULT - SUBJECTIVE AND OBJECTIVE BOX
Pt has been seen and examined with FP resident, resident supervised agree with a/p       Patient is a 69y old  Male who presents with a chief complaint of Abdominal pain (17 May 2019 08:43)          PHYSICAL EXAM:  Vital Signs Last 24 Hrs  T(C): 37.3 (17 May 2019 10:53), Max: 37.3 (17 May 2019 10:53)  T(F): 99.1 (17 May 2019 10:53), Max: 99.1 (17 May 2019 10:53)  HR: 67 (17 May 2019 10:53) (67 - 76)  BP: 137/71 (17 May 2019 10:53) (114/67 - 145/76)  BP(mean): --  RR: 18 (17 May 2019 10:53) (18 - 20)  SpO2: 98% (17 May 2019 10:53) (97% - 98%)  general- comfortable   -[id number]-aeeb,cta  -cvs-s1s2 normal   -p/a-soft,bs+        A/P      #ct clear liquid diet and advance as tolerated. supportive care     #Discussed with Dr. Cline

## 2019-05-17 NOTE — PROGRESS NOTE ADULT - ASSESSMENT
68 y/o male with PMHx of DM type 2, HLD, HTN, and recurrent Pancreatitis, s/p cholecystectomy, who presents to the ED with c/o epigastric abdominal pain x 4 days.     #Acute pancreatitis   - full liquid diet as tolerated   - Lipid profile WNL  - continue IV hydration  - CT abdomen as above  - pain management  - possibly medication induced (tradjenta)  - GI consult appreciated    #DM type 2  - ISS  - On Lantus 30 units qhs at home  - HbA1c 10.2  - Hold oral hypoglycemic agents     #HTN  - continue amlodipine     #DVT ppx  - SCDs  - ambulation as tolerated    #Advance directives  - Full code 70 y/o male with PMHx of DM type 2, HLD, HTN, and recurrent Pancreatitis, s/p cholecystectomy, who presents to the ED with c/o epigastric abdominal pain x 4 days.     #Acute pancreatitis   - full liquid diet as tolerated   - Lipid profile WNL  - continue IV hydration  - CT abdomen as above  - pain management  - possibly medication (tradjenta) and or DM induced  - GI consult appreciated    #DM type 2  - ISS  - On Lantus 30 units qhs at home  - HbA1c 10.2  - Hold oral hypoglycemic agents     #HTN  - continue amlodipine     #DVT ppx  - SCDs  - ambulation as tolerated    #Advance directives  - Full code

## 2019-05-17 NOTE — PROGRESS NOTE ADULT - ASSESSMENT
69-year-old male poorly controlled diabetic with recurrent mild idiopathic pancreatitis, possibly related to underlying diabetes or medications such as Tradjenta.  He is improving slowly with supportive care.    Recommendations:    -Continue IV fluids today  -Continue clears or full liquid diet, do not advance until his pain improves  -continue to observe since clinically same as yesterday  -follow up labs  -As outpatient will need close follow-up with his endocrinologist to improve his control diabetes and consider alternative medications to Tradjenta  Will follow

## 2019-05-17 NOTE — PROGRESS NOTE ADULT - SUBJECTIVE AND OBJECTIVE BOX
GI    abd pain same as yesterday  not eating much due to pain  no vomiting, +nausea  required morphine overnight    ROS: Otherwise negative, all systems reviewed    PHYSICAL EXAM:  Vital Signs Last 24 Hrs  T(C): 37 (17 May 2019 04:59), Max: 37.4 (16 May 2019 10:43)  T(F): 98.6 (17 May 2019 04:59), Max: 99.3 (16 May 2019 10:43)  HR: 68 (17 May 2019 04:59) (68 - 79)  BP: 114/67 (17 May 2019 04:59) (114/67 - 146/77)  BP(mean): --  RR: 20 (16 May 2019 23:39) (16 - 20)  SpO2: 97% (16 May 2019 23:39) (97% - 100%)    Constitutional: No acute distress, alert and oriented ×3    Eyes: Anicteric    Neck: Supple, no JVD    Respiratory: Clear to auscultation bilaterally, breathing comfortably    Cardiovascular: Regular rate and rhythm, no murmur    Gastrointestinal: Soft. mild epigastric tenderness, nondistended, bowel sounds present.  No mass.  No hepatosplenomegaly.    Rectal: Deferred    Extremities: Warm, well-perfused, no edema    Neurological: Grossly intact, no focal deficits    Skin: No lesion or rash    Lymph Nodes: No cervical or supraclavicular lymphadenopathy    Psychiatric: Mood and affect seem normal

## 2019-05-18 ENCOUNTER — TRANSCRIPTION ENCOUNTER (OUTPATIENT)
Age: 69
End: 2019-05-18

## 2019-05-18 LAB
ANION GAP SERPL CALC-SCNC: 6 MMOL/L — SIGNIFICANT CHANGE UP (ref 5–17)
BUN SERPL-MCNC: 10 MG/DL — SIGNIFICANT CHANGE UP (ref 7–23)
CALCIUM SERPL-MCNC: 9 MG/DL — SIGNIFICANT CHANGE UP (ref 8.5–10.1)
CHLORIDE SERPL-SCNC: 106 MMOL/L — SIGNIFICANT CHANGE UP (ref 96–108)
CO2 SERPL-SCNC: 27 MMOL/L — SIGNIFICANT CHANGE UP (ref 22–31)
CREAT SERPL-MCNC: 1.12 MG/DL — SIGNIFICANT CHANGE UP (ref 0.5–1.3)
GLUCOSE BLDC GLUCOMTR-MCNC: 129 MG/DL — HIGH (ref 70–99)
GLUCOSE BLDC GLUCOMTR-MCNC: 152 MG/DL — HIGH (ref 70–99)
GLUCOSE BLDC GLUCOMTR-MCNC: 157 MG/DL — HIGH (ref 70–99)
GLUCOSE BLDC GLUCOMTR-MCNC: 238 MG/DL — HIGH (ref 70–99)
GLUCOSE SERPL-MCNC: 136 MG/DL — HIGH (ref 70–99)
HCT VFR BLD CALC: 48.3 % — SIGNIFICANT CHANGE UP (ref 39–50)
HGB BLD-MCNC: 15.5 G/DL — SIGNIFICANT CHANGE UP (ref 13–17)
MCHC RBC-ENTMCNC: 29 PG — SIGNIFICANT CHANGE UP (ref 27–34)
MCHC RBC-ENTMCNC: 32.1 GM/DL — SIGNIFICANT CHANGE UP (ref 32–36)
MCV RBC AUTO: 90.3 FL — SIGNIFICANT CHANGE UP (ref 80–100)
PLATELET # BLD AUTO: 234 K/UL — SIGNIFICANT CHANGE UP (ref 150–400)
POTASSIUM SERPL-MCNC: 3.9 MMOL/L — SIGNIFICANT CHANGE UP (ref 3.5–5.3)
POTASSIUM SERPL-SCNC: 3.9 MMOL/L — SIGNIFICANT CHANGE UP (ref 3.5–5.3)
RBC # BLD: 5.35 M/UL — SIGNIFICANT CHANGE UP (ref 4.2–5.8)
RBC # FLD: 13 % — SIGNIFICANT CHANGE UP (ref 10.3–14.5)
SODIUM SERPL-SCNC: 139 MMOL/L — SIGNIFICANT CHANGE UP (ref 135–145)
WBC # BLD: 7.14 K/UL — SIGNIFICANT CHANGE UP (ref 3.8–10.5)
WBC # FLD AUTO: 7.14 K/UL — SIGNIFICANT CHANGE UP (ref 3.8–10.5)

## 2019-05-18 PROCEDURE — 99231 SBSQ HOSP IP/OBS SF/LOW 25: CPT

## 2019-05-18 RX ORDER — INSULIN LISPRO 100/ML
5 VIAL (ML) SUBCUTANEOUS
Qty: 1 | Refills: 0
Start: 2019-05-18 | End: 2019-05-24

## 2019-05-18 RX ORDER — PANTOPRAZOLE SODIUM 20 MG/1
1 TABLET, DELAYED RELEASE ORAL
Qty: 7 | Refills: 0
Start: 2019-05-18 | End: 2019-05-24

## 2019-05-18 RX ORDER — OXYCODONE AND ACETAMINOPHEN 5; 325 MG/1; MG/1
1 TABLET ORAL EVERY 4 HOURS
Refills: 0 | Status: DISCONTINUED | OUTPATIENT
Start: 2019-05-18 | End: 2019-05-19

## 2019-05-18 RX ADMIN — Medication 650 MILLIGRAM(S): at 22:22

## 2019-05-18 RX ADMIN — AMLODIPINE BESYLATE 5 MILLIGRAM(S): 2.5 TABLET ORAL at 05:26

## 2019-05-18 RX ADMIN — OXYCODONE AND ACETAMINOPHEN 1 TABLET(S): 5; 325 TABLET ORAL at 18:39

## 2019-05-18 RX ADMIN — Medication 2: at 12:11

## 2019-05-18 RX ADMIN — SODIUM CHLORIDE 100 MILLILITER(S): 9 INJECTION INTRAMUSCULAR; INTRAVENOUS; SUBCUTANEOUS at 05:26

## 2019-05-18 RX ADMIN — Medication 3 MILLIGRAM(S): at 21:06

## 2019-05-18 RX ADMIN — PANTOPRAZOLE SODIUM 40 MILLIGRAM(S): 20 TABLET, DELAYED RELEASE ORAL at 05:26

## 2019-05-18 RX ADMIN — Medication 2: at 17:48

## 2019-05-18 NOTE — PROGRESS NOTE ADULT - SUBJECTIVE AND OBJECTIVE BOX
68 y/o male with PMHx of DM type 2, HLD, HTN, and recurrent Pancreatitis, s/p cholecystectomy, who presents to the ED with c/o epigastric abdominal pain x 4 days. Reports sharp epigastric pain, with severity of 7/10,  radiating to RUQ and right ribs area that aggravated with food. Patient was seen in ED on 5/12/19, CT abdomen showed acute pancreatitis and was discharge home to f/u with PMD. Pt states that he was unable to get appointment with PMD and pain increased therefore in returned to the ED.  Denies fever, chills, nausea, vomiting, diarrhea or constipation. Denies ETOH use.     ED course  Lipase 510, urine glucose >1000  S/p 1 L NS bolus.     5/18: Patient evaluated at bedside. States that abdominal pain is better. Was able to  tolerate breakfast without abdominal pain, nausea or vomiting. Stable for DC.    REVIEW OF SYSTEMS:    CONSTITUTIONAL: No weakness, fevers or chills  EYES/ENT: No visual changes;  No vertigo or throat pain   NECK: No pain or stiffness  RESPIRATORY: No cough, wheezing, hemoptysis; No shortness of breath  CARDIOVASCULAR: No chest pain or palpitations  GASTROINTESTINAL: No abdominal or epigastric pain. No nausea, vomiting, or hematemesis; No diarrhea or constipation. No melena or hematochezia.  GENITOURINARY: No dysuria, frequency or hematuria  NEUROLOGICAL: No numbness or weakness  SKIN: No itching, burning, rashes, or lesions   All other review of systems is negative unless indicated above.      MEDICATIONS  (STANDING):  amLODIPine   Tablet 5 milliGRAM(s) Oral daily  dextrose 5%. 1000 milliLiter(s) (50 mL/Hr) IV Continuous <Continuous>  dextrose 50% Injectable 12.5 Gram(s) IV Push once  dextrose 50% Injectable 25 Gram(s) IV Push once  dextrose 50% Injectable 25 Gram(s) IV Push once  insulin lispro (HumaLOG) corrective regimen sliding scale   SubCutaneous three times a day before meals  insulin lispro (HumaLOG) corrective regimen sliding scale   SubCutaneous at bedtime  pantoprazole    Tablet 40 milliGRAM(s) Oral before breakfast    MEDICATIONS  (PRN):  acetaminophen   Tablet .. 650 milliGRAM(s) Oral every 6 hours PRN Temp greater or equal to 38C (100.4F), Mild Pain (1 - 3)  dextrose 40% Gel 15 Gram(s) Oral once PRN Blood Glucose LESS THAN 70 milliGRAM(s)/deciliter  docusate sodium 100 milliGRAM(s) Oral three times a day PRN Constipation  glucagon  Injectable 1 milliGRAM(s) IntraMuscular once PRN Glucose LESS THAN 70 milligrams/deciliter  melatonin 3 milliGRAM(s) Oral at bedtime PRN Insomnia  ondansetron Injectable 4 milliGRAM(s) IV Push every 6 hours PRN Nausea and/or Vomiting  oxyCODONE    5 mG/acetaminophen 325 mG 1 Tablet(s) Oral every 4 hours PRN Moderate Pain (4 - 6)    Vital Signs (24 Hrs):  T(C): 37.1 (05-18-19 @ 17:02), Max: 37.3 (05-18-19 @ 10:29)  HR: 64 (05-18-19 @ 17:02) (64 - 69)  BP: 133/71 (05-18-19 @ 17:02) (131/61 - 133/71)  RR: 16 (05-18-19 @ 17:02) (16 - 18)  SpO2: 99% (05-18-19 @ 17:02) (98% - 100%)  Wt(kg): --  Daily     Daily     I&O's Summary    17 May 2019 07:01  -  18 May 2019 07:00  --------------------------------------------------------  IN: 1050 mL / OUT: 640 mL / NET: 410 mL      PHYSICAL EXAM:  GENERAL: NAD, well-developed, comfortable  HEAD:  Atraumatic, Normocephalic  EYES: EOMI, PERRLA, conjunctiva and sclera clear  NECK: Supple, No JVD  CHEST/LUNG: Clear to auscultation bilaterally; No wheeze, ronchi or rales  HEART: Regular rate and rhythm; No murmurs, rubs, or gallops  ABDOMEN: Soft, non tender, +distension; Bowel sounds present; no guarding or rebound  EXTREMITIES:  2+ Peripheral Pulses, No clubbing, cyanosis, or edema  PSYCH: AAOx3  NEUROLOGY: non-focal  SKIN: No rashes or lesions    LABS:                        15.5   7.14  )-----------( 234      ( 18 May 2019 08:25 )             48.3     18 May 2019 08:25    139    |  106    |  10     ----------------------------<  136    3.9     |  27     |  1.12     Ca    9.0        18 May 2019 08:25            Lipase, Serum (05.14.19 @ 21:18)    Lipase, Serum: 510 U/L    Lipid Profile in AM (05.15.19 @ 07:30)    Total Cholesterol/HDL Ratio Measurement: 3.8 RATIO    Cholesterol, Serum: 134 mg/dL    Triglycerides, Serum: 91 mg/dL    HDL Cholesterol, Serum: 35: HDL Levels >/= 60 mg/dL are considered beneficial and a "negative" risk  factor.  Effective 08/15/2018: New reference range and interpretive comment. mg/dL    Direct LDL: 81: LDL Cholesterol (mg/dL) --- Interpretive Comment (for adults 18 and over)  Optimal LDL Level may vary based on clinical situation  Below 70                   Ideal for people at very high risk of heart  disease  Below 100                  Ideal for people at risk of heart disease  100 - 129                    [place]  130 - 159                    Borderline high  160 - 189                    High  190 and Above           Very high mg/dL    Hemoglobin A1C, Whole Blood: 10.2: Method: Immunoassay       Reference Range                4.0-5.6%       High risk (prediabetic)        5.7-6.4%       Diabetic, diagnostic             >=6.5%       ADA diabetic treatment goal       <7.0%  The Hemoglobin A1c testing is NGSP-certified.Reference ranges are based  upon the 2010 recommendations of  the American Diabetes Association.  Interpretation may vary for children  and adolescents. % (05.15.19 @ 07:30)        < from: CT Abdomen and Pelvis w/ IV Cont (05.12.19 @ 13:28) >  IMPRESSION:     Mild infiltration of the fat in the region of the pancreatic head, likely   pancreatitis; correlation is recommended with pancreatic enzymes.    < end of copied text >

## 2019-05-18 NOTE — DISCHARGE NOTE PROVIDER - CARE PROVIDER_API CALL
[place] [address]  Phone: [phone]  Fax: [phone]  Follow Up Time: Navarro Welch)  [address] [address]  Phone: [phone]  Fax: [phone]  Follow Up Time:     Jenifer Alves  [address], [place] [address]  Phone: [phone]  Fax: (   )    -  Follow Up Time: Navarro Welch)  Medicine  [address] [place] [address]  Phone: [phone]  Fax: [phone]  Follow Up Time:     Jenifer Alves  [address], [place] [address]  Phone: [phone]  Fax: (   )    -  Follow Up Time:     Rmey Cline)  Gastroenterology; Internal Medicine  [address], [address]  [place] [address]  Phone: [phone]  Fax: [phone]  Follow Up Time:

## 2019-05-18 NOTE — DISCHARGE NOTE PROVIDER - HOSPITAL COURSE
68 y/o male with PMHx of DM type 2, HLD, HTN, and recurrent Pancreatitis, s/p cholecystectomy, who presents to the ED with c/o epigastric abdominal pain x 4 days. Reports sharp epigastric pain, with severity of 7/10,  radiating to RUQ and right ribs area that aggravated with food. Patient was seen in ED on 5/12/19, CT abdomen showed acute pancreatitis and was discharge home to f/u with PMD. Pt states that he was unable to get appointment with PMD and pain increased therefore in returned to the ED.  Denies fever, chills, nausea, vomiting, diarrhea or constipation. Denies ETOH use. Pt admitted for acute pancreatitis. Lab were significant for lipase of 510. CT abd showed findings suggestive of pancreatitis. As patient does not drink, lipid panel normal and had cholecystectomy years ago, pancreatitis likely due to trajenta and or DM itself. Patient received aggressive IV hydration and diet was advance slowly. Pt is tolerating regular diet with no abdominal pain, nausea or vomiting. On day of discharge patient is afebrile and hemodynamically stable. Will follow up with outpatient providers for further management. Will DC with his current DM medications excluding trajenta and will add humalog 5 units 3 x a day before meals per his endocrinologist recommendations.  ________ is aware of hospital course and discharge plan.        5/18: Patient evaluated at bedside. States that abdominal pain is better. Was able to  tolerate breakfast without abdominal pain, nausea or vomiting. Stable for DC.        REVIEW OF SYSTEMS:        CONSTITUTIONAL: No weakness, fevers or chills    EYES/ENT: No visual changes;  No vertigo or throat pain     NECK: No pain or stiffness    RESPIRATORY: No cough, wheezing, hemoptysis; No shortness of breath    CARDIOVASCULAR: No chest pain or palpitations    GASTROINTESTINAL: No abdominal or epigastric pain. No nausea, vomiting, or hematemesis; No diarrhea or constipation. No melena or hematochezia.    GENITOURINARY: No dysuria, frequency or hematuria    NEUROLOGICAL: No numbness or weakness    SKIN: No itching, burning, rashes, or lesions     All other review of systems is negative unless indicated above.        Vital Signs (24 Hrs):    T(C): 37.3 (05-18-19 @ 10:29), Max: 37.3 (05-18-19 @ 10:29)    HR: 69 (05-18-19 @ 10:29) (69 - 72)    BP: 131/75 (05-18-19 @ 10:29) (131/61 - 156/86)    RR: 17 (05-18-19 @ 10:29) (16 - 18)    SpO2: 100% (05-18-19 @ 10:29) (98% - 100%)    Wt(kg): --    Daily       Daily         I&O's Summary        17 May 2019 07:01  -  18 May 2019 07:00    --------------------------------------------------------    IN: 1050 mL / OUT: 640 mL / NET: 410 mL                PHYSICAL EXAM:    GENERAL: NAD, well-developed, comfortable    HEAD:  Normocephalic    EYES: EOMI, conjunctiva and sclera clear    NECK: Supple    CHEST/LUNG: Clear to auscultation bilaterally; No wheeze, ronchi or rales    HEART: Regular rate and rhythm; No murmurs, rubs, or gallops    ABDOMEN: Soft, non tender, +distension; Bowel sounds present; no guarding or rebound    EXTREMITIES:  2+ Peripheral Pulses, No clubbing, cyanosis, or edema    PSYCH: AAOx3    NEUROLOGY: non-focal    SKIN: No rashes or lesions 70 y/o male with PMHx of DM type 2, HLD, HTN, and recurrent Pancreatitis, s/p cholecystectomy, who presents to the ED with c/o epigastric abdominal pain x 4 days. Reports sharp epigastric pain, with severity of 7/10,  radiating to RUQ and right ribs area that aggravated with food. Patient was seen in ED on 5/12/19, CT abdomen showed acute pancreatitis and was discharge home to f/u with PMD. Pt states that he was unable to get appointment with PMD and pain increased therefore in returned to the ED.  Denies fever, chills, nausea, vomiting, diarrhea or constipation. Denies ETOH use. Pt admitted for acute pancreatitis. Lab were significant for lipase of 510. CT abd showed findings suggestive of pancreatitis. As patient does not drink, lipid panel normal and had cholecystectomy years ago, pancreatitis likely due to trajenta and or DM itself. Patient received aggressive IV hydration and diet was advance slowly. Pt is tolerating regular diet with no abdominal pain, nausea or vomiting. On day of discharge patient is afebrile and hemodynamically stable. Will follow up with outpatient providers for further management. Will DC with his current DM medications excluding trajenta. Dr. Alves his endocrinologist is aware of hospital course and discharge plan.        5/18: Patient evaluated at bedside. States that abdominal pain is better. Was able to  tolerate breakfast without abdominal pain, nausea or vomiting. Stable for DC.        REVIEW OF SYSTEMS:        CONSTITUTIONAL: No weakness, fevers or chills    EYES/ENT: No visual changes;  No vertigo or throat pain     NECK: No pain or stiffness    RESPIRATORY: No cough, wheezing, hemoptysis; No shortness of breath    CARDIOVASCULAR: No chest pain or palpitations    GASTROINTESTINAL: No abdominal or epigastric pain. No nausea, vomiting, or hematemesis; No diarrhea or constipation. No melena or hematochezia.    GENITOURINARY: No dysuria, frequency or hematuria    NEUROLOGICAL: No numbness or weakness    SKIN: No itching, burning, rashes, or lesions     All other review of systems is negative unless indicated above.        Vital Signs (24 Hrs):    T(C): 37.3 (05-18-19 @ 10:29), Max: 37.3 (05-18-19 @ 10:29)    HR: 69 (05-18-19 @ 10:29) (69 - 72)    BP: 131/75 (05-18-19 @ 10:29) (131/61 - 156/86)    RR: 17 (05-18-19 @ 10:29) (16 - 18)    SpO2: 100% (05-18-19 @ 10:29) (98% - 100%)    Wt(kg): --    Daily       Daily         I&O's Summary        17 May 2019 07:01  -  18 May 2019 07:00    --------------------------------------------------------    IN: 1050 mL / OUT: 640 mL / NET: 410 mL                PHYSICAL EXAM:    GENERAL: NAD, well-developed, comfortable    HEAD:  Normocephalic    EYES: EOMI, conjunctiva and sclera clear    NECK: Supple    CHEST/LUNG: Clear to auscultation bilaterally; No wheeze, ronchi or rales    HEART: Regular rate and rhythm; No murmurs, rubs, or gallops    ABDOMEN: Soft, non tender, +distension; Bowel sounds present; no guarding or rebound    EXTREMITIES:  2+ Peripheral Pulses, No clubbing, cyanosis, or edema    PSYCH: AAOx3    NEUROLOGY: non-focal    SKIN: No rashes or lesions 68 y/o male with PMHx of DM type 2, HLD, HTN, and recurrent Pancreatitis, s/p cholecystectomy, who presents to the ED with c/o epigastric abdominal pain x 4 days. Reports sharp epigastric pain, with severity of 7/10,  radiating to RUQ and right ribs area that aggravated with food. Patient was seen in ED on 5/12/19, CT abdomen showed acute pancreatitis and was discharge home to f/u with PMD. Pt states that he was unable to get appointment with PMD and pain increased therefore in returned to the ED.  Denies fever, chills, nausea, vomiting, diarrhea or constipation. Denies ETOH use. Pt admitted for acute pancreatitis. Lab were significant for lipase of 510. CT abd showed findings suggestive of pancreatitis. As patient does not drink, lipid panel normal and had cholecystectomy years ago, pancreatitis likely due to trajenta and or DM itself. Patient received aggressive IV hydration and diet was advance slowly. Pt is tolerating regular diet with no abdominal pain, nausea or vomiting. On day of discharge patient is afebrile and hemodynamically stable. Will follow up with outpatient providers for further management. Will DC with his current DM medications excluding trajenta. Pt states that he has all DM medications at home and no need for a refill. Dr. Alves his endocrinologist is aware of hospital course and discharge plan.        5/18: Patient evaluated at bedside. States that abdominal pain is better. Was able to  tolerate breakfast without abdominal pain, nausea or vomiting. Stable for DC.        REVIEW OF SYSTEMS:        CONSTITUTIONAL: No weakness, fevers or chills    EYES/ENT: No visual changes;  No vertigo or throat pain     NECK: No pain or stiffness    RESPIRATORY: No cough, wheezing, hemoptysis; No shortness of breath    CARDIOVASCULAR: No chest pain or palpitations    GASTROINTESTINAL: No abdominal or epigastric pain. No nausea, vomiting, or hematemesis; No diarrhea or constipation. No melena or hematochezia.    GENITOURINARY: No dysuria, frequency or hematuria    NEUROLOGICAL: No numbness or weakness    SKIN: No itching, burning, rashes, or lesions     All other review of systems is negative unless indicated above.        Vital Signs (24 Hrs):    T(C): 37.3 (05-18-19 @ 10:29), Max: 37.3 (05-18-19 @ 10:29)    HR: 69 (05-18-19 @ 10:29) (69 - 72)    BP: 131/75 (05-18-19 @ 10:29) (131/61 - 156/86)    RR: 17 (05-18-19 @ 10:29) (16 - 18)    SpO2: 100% (05-18-19 @ 10:29) (98% - 100%)    Wt(kg): --    Daily       Daily         I&O's Summary        17 May 2019 07:01  -  18 May 2019 07:00    --------------------------------------------------------    IN: 1050 mL / OUT: 640 mL / NET: 410 mL                PHYSICAL EXAM:    GENERAL: NAD, well-developed, comfortable    HEAD:  Normocephalic    EYES: EOMI, conjunctiva and sclera clear    NECK: Supple    CHEST/LUNG: Clear to auscultation bilaterally; No wheeze, ronchi or rales    HEART: Regular rate and rhythm; No murmurs, rubs, or gallops    ABDOMEN: Soft, non tender, +distension; Bowel sounds present; no guarding or rebound    EXTREMITIES:  2+ Peripheral Pulses, No clubbing, cyanosis, or edema    PSYCH: AAOx3    NEUROLOGY: non-focal    SKIN: No rashes or lesions

## 2019-05-18 NOTE — DISCHARGE NOTE PROVIDER - PROVIDER TOKENS
PROVIDER:[TOKEN:[[id number]]] PROVIDER:[TOKEN:[[id number]]],FREE:[LAST:[Long],FIRST:[Jenifer],PHONE:[phone]],FAX:[(   )    -],ADDRESS:[[address] [place] [address] [address], [place] [address]]] PROVIDER:[TOKEN:[[id number]]],FREE:[LAST:[Long],FIRST:[Jenifer],PHONE:[phone]],FAX:[(   )    -],ADDRESS:[[address] [place] [address] [address] [place] [address]]],PROVIDER:[TOKEN:[[id number]]]

## 2019-05-18 NOTE — PROGRESS NOTE ADULT - ASSESSMENT
68 yo male with pancreatitis, slowly improving. To try breakfast this morning. Will see if tolerated before discharge.

## 2019-05-18 NOTE — DISCHARGE NOTE PROVIDER - NSDCCPTREATMENT_GEN_ALL_CORE_FT
PRINCIPAL PROCEDURE  Procedure: CT scan of abdomen  Findings and Treatment: IMPRESSION:   Mild infiltration of the fat in the region of the pancreatic head, likely   pancreatitis; correlation is recommended with pancreatic enzymes.

## 2019-05-18 NOTE — PROGRESS NOTE ADULT - ASSESSMENT
68 y/o male with PMHx of DM type 2, HLD, HTN, and recurrent Pancreatitis, s/p cholecystectomy, who presents to the ED with c/o epigastric abdominal pain x 4 days.     #Acute pancreatitis   - stable for DC  - continue regular diet  - Lipid profile WNL  - s/p IVF  - CT abdomen as above  - pain management  - possibly medication (tradjenta) and or DM induced  - GI consult appreciated    #DM type 2  - ISS  - On Lantus 30 units qhs at home  - HbA1c 10.2  - Hold oral hypoglycemic agents     #HTN  - continue amlodipine     #DVT ppx  - SCDs  - ambulation as tolerated    #Advance directives  - Full code

## 2019-05-18 NOTE — DISCHARGE NOTE PROVIDER - NSDCCPCAREPLAN_GEN_ALL_CORE_FT
PRINCIPAL DISCHARGE DIAGNOSIS  Diagnosis: Recurrent pancreatitis  Assessment and Plan of Treatment: - continue pain medication as needed  - continue diet as tolerated  - stay hydrated  - stop tradjenta  - low carbohydrate and fat diet  - Continue diabetes medications. Continue to follow up with your endocrinolgist to maintain good glucose control.   - follow up with your primary care providers      SECONDARY DISCHARGE DIAGNOSES  Diagnosis: Diabetes mellitus  Assessment and Plan of Treatment: - Stop tradjenta  - continue all other home medications: Humalog sliding scale, metformin, fraxiga, lantus  - Prescription for humalog 5 units, 3 times a day sent to your pharmacy. Please take as prescribed.   - monitor your blood sugars frequently  - HbA1c 10.2  - Follow up with your endocrinologist within 1 week of discharge.  - Signs of hypoglycemia include but are not limited: fatigue, weakness, sensation that our heart is racing, sweating, blurry vision. If these symptoms occure have a glucose tablet, glass of juice or a piece of hard candy.    Diagnosis: HTN (hypertension)  Assessment and Plan of Treatment: - continue amlodipine  - DASH diet  - follow up with your primary care provider PRINCIPAL DISCHARGE DIAGNOSIS  Diagnosis: Recurrent pancreatitis  Assessment and Plan of Treatment: - Continue pain medication as needed. Prescription sent to your pharmacy. Please take as prescribed.   - Continue protonix. Prescription sent to your pharmacy. Please take as prescribed.   - stay hydrated  - stop tradjenta  - low carbohydrate and fat diet  - Continue diabetes medications. Continue to follow up with your endocrinolgist to maintain good glucose control.   - follow up with your primary care providers      SECONDARY DISCHARGE DIAGNOSES  Diagnosis: Diabetes mellitus  Assessment and Plan of Treatment: - Stop tradjenta  - continue all other home medications:   Humalog sliding scale , metformin, fraxiga, lantus 30 units a night  - monitor your blood sugars frequently  - HbA1c 10.2  - Follow up with your endocrinologist within 1 week of discharge.  - Signs of hypoglycemia include but are not limited: fatigue, weakness, sensation that our heart is racing, sweating, blurry vision. If these symptoms occure have a glucose tablet, glass of juice or a piece of hard candy.    Diagnosis: HTN (hypertension)  Assessment and Plan of Treatment: - continue amlodipine  - DASH diet  - follow up with your primary care provider PRINCIPAL DISCHARGE DIAGNOSIS  Diagnosis: Recurrent pancreatitis  Assessment and Plan of Treatment: - Continue pain medication as needed. Prescription sent to your pharmacy. Please take as prescribed.   - stay hydrated  - stop tradjenta  - low carbohydrate and fat diet  - Continue diabetes medications. Continue to follow up with your endocrinolgist to maintain good glucose control.   - follow up with your primary care providers      SECONDARY DISCHARGE DIAGNOSES  Diagnosis: Diabetes mellitus  Assessment and Plan of Treatment: - Stop tradjenta  - continue all other home medications:   Humalog sliding scale , metformin, fraxiga, lantus 30 units a night  - monitor your blood sugars frequently  - HbA1c 10.2  - Follow up with your endocrinologist within 1 week of discharge.  - Signs of hypoglycemia include but are not limited: fatigue, weakness, sensation that our heart is racing, sweating, blurry vision. If these symptoms occure have a glucose tablet, glass of juice or a piece of hard candy.    Diagnosis: HTN (hypertension)  Assessment and Plan of Treatment: - continue amlodipine  - DASH diet  - follow up with your primary care provider

## 2019-05-18 NOTE — PROGRESS NOTE ADULT - SUBJECTIVE AND OBJECTIVE BOX
Patient is a 69y old  Male who presents with a chief complaint of Abdominal pain (17 May 2019 16:38)      HPI:  68 y/o male with PMHx of DM type 2, HLD, HTN, and recurrent Pancreatitis, s/p cholecystectomy, who presents to the ED with c/o epigastric abdominal pain x 4 days. Reports sharp epigastric pain, with severity of 7/10,  radiating to RUQ and right ribs area that aggravated with food. Patient was seen in ED on 5/12/19, CT abdomen showed acute pancreatitis and was discharge home to f/u with PMD. Pt states that he was unable to get appointment with PMD and pain increased therefore in returned to the ED.  Denies fever, chills, nausea, vomiting, diarrhea or constipation. Denies ETOH use.     Patient had pain last night after eating. No pain this morning.    ED course  Lipase 510, urine glucose >1000  S/p 1 L NS bolus. (15 May 2019 03:51)      PAST MEDICAL & SURGICAL HISTORY:  DM (diabetes mellitus)  HTN (hypertension)  Pancreatitis  S/P laparoscopic cholecystectomy      MEDICATIONS  (STANDING):  amLODIPine   Tablet 5 milliGRAM(s) Oral daily  dextrose 5%. 1000 milliLiter(s) (50 mL/Hr) IV Continuous <Continuous>  dextrose 50% Injectable 12.5 Gram(s) IV Push once  dextrose 50% Injectable 25 Gram(s) IV Push once  dextrose 50% Injectable 25 Gram(s) IV Push once  insulin lispro (HumaLOG) corrective regimen sliding scale   SubCutaneous three times a day before meals  insulin lispro (HumaLOG) corrective regimen sliding scale   SubCutaneous at bedtime  pantoprazole    Tablet 40 milliGRAM(s) Oral before breakfast  sodium chloride 0.9%. 1000 milliLiter(s) (100 mL/Hr) IV Continuous <Continuous>    MEDICATIONS  (PRN):  acetaminophen   Tablet .. 650 milliGRAM(s) Oral every 6 hours PRN Temp greater or equal to 38C (100.4F), Mild Pain (1 - 3)  dextrose 40% Gel 15 Gram(s) Oral once PRN Blood Glucose LESS THAN 70 milliGRAM(s)/deciliter  docusate sodium 100 milliGRAM(s) Oral three times a day PRN Constipation  glucagon  Injectable 1 milliGRAM(s) IntraMuscular once PRN Glucose LESS THAN 70 milligrams/deciliter  melatonin 3 milliGRAM(s) Oral at bedtime PRN Insomnia  morphine  - Injectable 4 milliGRAM(s) IV Push every 4 hours PRN Moderate Pain (4 - 6)  ondansetron Injectable 4 milliGRAM(s) IV Push every 6 hours PRN Nausea and/or Vomiting      Allergies    No Known Allergies    Intolerances        Vital Signs Last 24 Hrs  T(C): 36.9 (18 May 2019 05:09), Max: 37.3 (17 May 2019 10:53)  T(F): 98.4 (18 May 2019 05:09), Max: 99.1 (17 May 2019 10:53)  HR: 69 (18 May 2019 05:09) (67 - 72)  BP: 131/61 (18 May 2019 05:09) (131/61 - 156/86)  BP(mean): --  RR: 18 (18 May 2019 05:09) (16 - 18)  SpO2: 98% (18 May 2019 05:09) (98% - 98%)    PHYSICAL EXAM:    Constitutional: NAD, well-developed  Respiratory: CTAB  Cardiovascular: S1 and S2, RRR, no M/G/R  Gastrointestinal: BS+, soft, NT/ND  LABS:                        14.4   7.37  )-----------( 224      ( 17 May 2019 07:40 )             44.6     05-17    137  |  105  |  13  ----------------------------<  143<H>  4.4   |  27  |  1.09    Ca    8.9      17 May 2019 07:40            RADIOLOGY & ADDITIONAL STUDIES:

## 2019-05-19 ENCOUNTER — TRANSCRIPTION ENCOUNTER (OUTPATIENT)
Age: 69
End: 2019-05-19

## 2019-05-19 VITALS
RESPIRATION RATE: 18 BRPM | SYSTOLIC BLOOD PRESSURE: 136 MMHG | DIASTOLIC BLOOD PRESSURE: 76 MMHG | HEART RATE: 70 BPM | OXYGEN SATURATION: 99 % | TEMPERATURE: 100 F

## 2019-05-19 LAB
GLUCOSE BLDC GLUCOMTR-MCNC: 159 MG/DL — HIGH (ref 70–99)
GLUCOSE BLDC GLUCOMTR-MCNC: 304 MG/DL — HIGH (ref 70–99)

## 2019-05-19 PROCEDURE — 99231 SBSQ HOSP IP/OBS SF/LOW 25: CPT

## 2019-05-19 RX ADMIN — Medication 2: at 08:20

## 2019-05-19 RX ADMIN — Medication 8: at 11:25

## 2019-05-19 RX ADMIN — AMLODIPINE BESYLATE 5 MILLIGRAM(S): 2.5 TABLET ORAL at 05:58

## 2019-05-19 RX ADMIN — OXYCODONE AND ACETAMINOPHEN 1 TABLET(S): 5; 325 TABLET ORAL at 01:24

## 2019-05-19 RX ADMIN — OXYCODONE AND ACETAMINOPHEN 1 TABLET(S): 5; 325 TABLET ORAL at 11:18

## 2019-05-19 RX ADMIN — PANTOPRAZOLE SODIUM 40 MILLIGRAM(S): 20 TABLET, DELAYED RELEASE ORAL at 05:58

## 2019-05-19 NOTE — PROGRESS NOTE ADULT - PROVIDER SPECIALTY LIST ADULT
Family Medicine
Gastroenterology
Hospitalist
Hospitalist
Family Medicine
Family Medicine

## 2019-05-19 NOTE — PROGRESS NOTE ADULT - SUBJECTIVE AND OBJECTIVE BOX
CHIEF COMPLAINT: Abdominal pain     SUBJECTIVE: 70 y/o male with PMHx of DM type 2, HLD, HTN, and recurrent Pancreatitis, s/p cholecystectomy, who presents to the ED with c/o epigastric abdominal pain x 4 days. Reports sharp epigastric pain, with severity of 7/10,  radiating to RUQ and right ribs area that aggravated with food. Patient was seen in ED on 5/12/19, CT abdomen showed acute pancreatitis and was discharge home to f/u with PMD. Pt states that he was unable to get appointment with PMD and pain increased therefore in returned to the ED.  Denies fever, chills, nausea, vomiting, diarrhea or constipation. Denies ETOH use.     ED course  Lipase 510, urine glucose >1000  S/p 1 L NS bolus.     5/19 :Patient seen and examined at the bedside .No overnight acute events .States overnight he had 7/10 and required percocet which helped with the pain .on the time of the admission had continuous pain now has intermittent pain come and goes in waves . In the AM he had 6/10 and percocet helped and had his breakfast and lunch .Took a dose of the percocet so denies abdominal pain after the lunch .No nausea ,vomiting ,diarrhoea  ,fever or constipation .       REVIEW OF SYSTEMS:    CONSTITUTIONAL: No weakness, fevers or chills  EYES/ENT: No visual changes;  No vertigo or throat pain   NECK: No pain or stiffness  RESPIRATORY: No cough, wheezing, hemoptysis; No shortness of breath  CARDIOVASCULAR: No chest pain or palpitations  GASTROINTESTINAL: mild abdominal or epigastric pain. No nausea, vomiting, or hematemesis; No diarrhea or constipation. No melena or hematochezia.  GENITOURINARY: No dysuria, frequency or hematuria  NEUROLOGICAL: No numbness or weakness  SKIN: No itching, burning, rashes, or lesions   All other review of systems is negative unless indicated above    Vital Signs Last 24 Hrs  T(C): 37.6 (19 May 2019 10:49), Max: 37.6 (19 May 2019 10:49)  T(F): 99.6 (19 May 2019 10:49), Max: 99.6 (19 May 2019 10:49)  HR: 70 (19 May 2019 10:49) (59 - 70)  BP: 136/76 (19 May 2019 10:49) (130/69 - 147/71)  BP(mean): --  RR: 18 (19 May 2019 10:49) (16 - 18)  SpO2: 99% (19 May 2019 10:49) (98% - 99%)    I&O's Summary      CAPILLARY BLOOD GLUCOSE      POCT Blood Glucose.: 304 mg/dL (19 May 2019 11:22)  POCT Blood Glucose.: 159 mg/dL (19 May 2019 08:07)  POCT Blood Glucose.: 238 mg/dL (18 May 2019 21:07)  POCT Blood Glucose.: 152 mg/dL (18 May 2019 17:45)      PHYSICAL EXAM:    Constitutional: NAD, awake and alert, well-developed  HEENT: PERR, EOMI, Normal Hearing, MMM  Neck: Soft and supple, No LAD, No JVD  Respiratory: Breath sounds are clear bilaterally, No wheezing, rales or rhonchi  Cardiovascular: S1 and S2, regular rate and rhythm, no Murmurs, gallops or rubs  Gastrointestinal: Bowel Sounds present, soft, nontender, nondistended, no guarding, no rebound  Extremities: No peripheral edema  Vascular: 2+ peripheral pulses  Neurological: A/O x 3, no focal deficits  Musculoskeletal: 5/5 strength b/l upper and lower extremities  Skin: No rashes    MEDICATIONS:  MEDICATIONS  (STANDING):  amLODIPine   Tablet 5 milliGRAM(s) Oral daily  dextrose 5%. 1000 milliLiter(s) (50 mL/Hr) IV Continuous <Continuous>  dextrose 50% Injectable 12.5 Gram(s) IV Push once  dextrose 50% Injectable 25 Gram(s) IV Push once  dextrose 50% Injectable 25 Gram(s) IV Push once  insulin lispro (HumaLOG) corrective regimen sliding scale   SubCutaneous three times a day before meals  insulin lispro (HumaLOG) corrective regimen sliding scale   SubCutaneous at bedtime  pantoprazole    Tablet 40 milliGRAM(s) Oral before breakfast      LABS: All Labs Reviewed:                        15.5   7.14  )-----------( 234      ( 18 May 2019 08:25 )             48.3     05-18    139  |  106  |  10  ----------------------------<  136<H>  3.9   |  27  |  1.12    Ca    9.0      18 May 2019 08:25            Blood Culture:     RADIOLOGY/EKG:    DVT PPX:    ADVANCED DIRECTIVE:    DISPOSITION: CHIEF COMPLAINT: Abdominal pain     SUBJECTIVE: 70 y/o male with PMHx of DM type 2, HLD, HTN, and recurrent Pancreatitis, s/p cholecystectomy, who presents to the ED with c/o epigastric abdominal pain x 4 days. Reports sharp epigastric pain, with severity of 7/10,  radiating to RUQ and right ribs area that aggravated with food. Patient was seen in ED on 5/12/19, CT abdomen showed acute pancreatitis and was discharge home to f/u with PMD. Pt states that he was unable to get appointment with PMD and pain increased therefore in returned to the ED.  Denies fever, chills, nausea, vomiting, diarrhea or constipation. Denies ETOH use.     ED course  Lipase 510, urine glucose >1000  S/p 1 L NS bolus.     5/19 :Patient seen and examined at the bedside .No overnight acute events .States overnight he had 7/10 and required percocet which helped with the pain .on the time of the admission had continuous pain now has intermittent pain come and goes in waves . In the AM he had 6/10 and percocet helped and had his breakfast and lunch .Took a dose of the percocet so denies abdominal pain after the lunch .No nausea ,vomiting ,diarrhoea  ,fever or constipation . Called the Dr Navarro Welch office answering service call back number .      REVIEW OF SYSTEMS:    CONSTITUTIONAL: No weakness, fevers or chills  EYES/ENT: No visual changes;  No vertigo or throat pain   NECK: No pain or stiffness  RESPIRATORY: No cough, wheezing, hemoptysis; No shortness of breath  CARDIOVASCULAR: No chest pain or palpitations  GASTROINTESTINAL: mild abdominal or epigastric pain. No nausea, vomiting, or hematemesis; No diarrhea or constipation. No melena or hematochezia.  GENITOURINARY: No dysuria, frequency or hematuria  NEUROLOGICAL: No numbness or weakness  SKIN: No itching, burning, rashes, or lesions   All other review of systems is negative unless indicated above    Vital Signs Last 24 Hrs  T(C): 37.6 (19 May 2019 10:49), Max: 37.6 (19 May 2019 10:49)  T(F): 99.6 (19 May 2019 10:49), Max: 99.6 (19 May 2019 10:49)  HR: 70 (19 May 2019 10:49) (59 - 70)  BP: 136/76 (19 May 2019 10:49) (130/69 - 147/71)  BP(mean): --  RR: 18 (19 May 2019 10:49) (16 - 18)  SpO2: 99% (19 May 2019 10:49) (98% - 99%)    I&O's Summary      CAPILLARY BLOOD GLUCOSE      POCT Blood Glucose.: 304 mg/dL (19 May 2019 11:22)  POCT Blood Glucose.: 159 mg/dL (19 May 2019 08:07)  POCT Blood Glucose.: 238 mg/dL (18 May 2019 21:07)  POCT Blood Glucose.: 152 mg/dL (18 May 2019 17:45)      PHYSICAL EXAM:    Constitutional: NAD, awake and alert, well-developed  HEENT: PERR, EOMI, Normal Hearing, MMM  Neck: Soft and supple, No LAD, No JVD  Respiratory: Breath sounds are clear bilaterally, No wheezing, rales or rhonchi  Cardiovascular: S1 and S2, regular rate and rhythm, no Murmurs, gallops or rubs  Gastrointestinal: Bowel Sounds present, soft, nontender, nondistended, no guarding, no rebound  Extremities: No peripheral edema  Vascular: 2+ peripheral pulses  Neurological: A/O x 3, no focal deficits  Musculoskeletal: 5/5 strength b/l upper and lower extremities  Skin: No rashes    MEDICATIONS:  MEDICATIONS  (STANDING):  amLODIPine   Tablet 5 milliGRAM(s) Oral daily  dextrose 5%. 1000 milliLiter(s) (50 mL/Hr) IV Continuous <Continuous>  dextrose 50% Injectable 12.5 Gram(s) IV Push once  dextrose 50% Injectable 25 Gram(s) IV Push once  dextrose 50% Injectable 25 Gram(s) IV Push once  insulin lispro (HumaLOG) corrective regimen sliding scale   SubCutaneous three times a day before meals  insulin lispro (HumaLOG) corrective regimen sliding scale   SubCutaneous at bedtime  pantoprazole    Tablet 40 milliGRAM(s) Oral before breakfast      LABS: All Labs Reviewed:                        15.5   7.14  )-----------( 234      ( 18 May 2019 08:25 )             48.3     05-18    139  |  106  |  10  ----------------------------<  136<H>  3.9   |  27  |  1.12    Ca    9.0      18 May 2019 08:25            Blood Culture:     RADIOLOGY/EKG:    DVT PPX:    ADVANCED DIRECTIVE:    DISPOSITION:

## 2019-05-19 NOTE — PROGRESS NOTE ADULT - SUBJECTIVE AND OBJECTIVE BOX
Patient is a 69y old  Male who presents with a chief complaint of Abdominal pain (18 May 2019 20:26)      HPI:  pt feeling better overall  still some intermittent upper abdominal pain but milder    PAST MEDICAL & SURGICAL HISTORY:  DM (diabetes mellitus)  HTN (hypertension)  Pancreatitis  S/P laparoscopic cholecystectomy    MEDICATIONS  (STANDING):  amLODIPine   Tablet 5 milliGRAM(s) Oral daily  dextrose 5%. 1000 milliLiter(s) (50 mL/Hr) IV Continuous <Continuous>  dextrose 50% Injectable 12.5 Gram(s) IV Push once  dextrose 50% Injectable 25 Gram(s) IV Push once  dextrose 50% Injectable 25 Gram(s) IV Push once  insulin lispro (HumaLOG) corrective regimen sliding scale   SubCutaneous three times a day before meals  insulin lispro (HumaLOG) corrective regimen sliding scale   SubCutaneous at bedtime  pantoprazole    Tablet 40 milliGRAM(s) Oral before breakfast    MEDICATIONS  (PRN):  acetaminophen   Tablet .. 650 milliGRAM(s) Oral every 6 hours PRN Temp greater or equal to 38C (100.4F), Mild Pain (1 - 3)  dextrose 40% Gel 15 Gram(s) Oral once PRN Blood Glucose LESS THAN 70 milliGRAM(s)/deciliter  docusate sodium 100 milliGRAM(s) Oral three times a day PRN Constipation  glucagon  Injectable 1 milliGRAM(s) IntraMuscular once PRN Glucose LESS THAN 70 milligrams/deciliter  melatonin 3 milliGRAM(s) Oral at bedtime PRN Insomnia  ondansetron Injectable 4 milliGRAM(s) IV Push every 6 hours PRN Nausea and/or Vomiting  oxyCODONE    5 mG/acetaminophen 325 mG 1 Tablet(s) Oral every 4 hours PRN Moderate Pain (4 - 6)    Allergies  No Known Allergies    REVIEW OF SYSTEMS:    CONSTITUTIONAL: No weakness, fevers or chills  RESPIRATORY: No cough, wheezing, hemoptysis; No shortness of breath  CARDIOVASCULAR: No chest pain or palpitations  GASTROINTESTINAL: as above  All other review of systems is negative unless indicated above.    Vital Signs Last 24 Hrs  T(C): 37.6 (19 May 2019 10:49), Max: 37.6 (19 May 2019 10:49)  T(F): 99.6 (19 May 2019 10:49), Max: 99.6 (19 May 2019 10:49)  HR: 70 (19 May 2019 10:49) (59 - 70)  BP: 136/76 (19 May 2019 10:49) (130/69 - 147/71)  BP(mean): --  RR: 18 (19 May 2019 10:49) (16 - 18)  SpO2: 99% (19 May 2019 10:49) (98% - 99%)    PHYSICAL EXAM:    Constitutional: NAD  Respiratory: CTA  Cardiovascular: S1 and S2  Gastrointestinal: BS+, soft, NT/ND      LABS:                        15.5   7.14  )-----------( 234      ( 18 May 2019 08:25 )             48.3     05-18    139  |  106  |  10  ----------------------------<  136<H>  3.9   |  27  |  1.12    Ca    9.0      18 May 2019 08:25            RADIOLOGY & ADDITIONAL STUDIES:

## 2019-05-19 NOTE — PROGRESS NOTE ADULT - ATTENDING COMMENTS
Patient seen and examined with Family Medicine Residents Rashel Serrano and Kamari Allen on the Family Medicine Teaching Service.  Agree with history, physical, labs and plan which were reviewed in detail after a face to face encounter with the patient.
on exam- comfortable   -rs-aeeb, cta  -p/a-soft, bs+  -cvs-s1s2 normal     #clinically better, ct pain control and advance diet as tolerated     #DM- manage with only insuline    #discussed with pt and all questions have been answered. Discussed with Dr. Cline
Patient seen and examined with Family Medicine Residents Rashel Patterson and Carolina Luo on the Family Medicine Teaching Service.  Agree with history, physical, labs and plan which were reviewed in detail after a face to face encounter with the patient.

## 2019-05-19 NOTE — DISCHARGE NOTE NURSING/CASE MANAGEMENT/SOCIAL WORK - NSDCDPATPORTLINK_GEN_ALL_CORE
You can access the [facility] Patient Portal, offered by [facility], by registering with the following website: [email or web address]

## 2019-05-22 DIAGNOSIS — K85.90 ACUTE PANCREATITIS WITHOUT NECROSIS OR INFECTION, UNSPECIFIED: ICD-10-CM

## 2019-05-22 DIAGNOSIS — K86.1 OTHER CHRONIC PANCREATITIS: ICD-10-CM

## 2019-05-22 DIAGNOSIS — E11.65 TYPE 2 DIABETES MELLITUS WITH HYPERGLYCEMIA: ICD-10-CM

## 2019-05-22 DIAGNOSIS — Z79.4 LONG TERM (CURRENT) USE OF INSULIN: ICD-10-CM

## 2019-05-22 DIAGNOSIS — I10 ESSENTIAL (PRIMARY) HYPERTENSION: ICD-10-CM

## 2019-05-22 DIAGNOSIS — T38.3X5A ADVERSE EFFECT OF INSULIN AND ORAL HYPOGLYCEMIC [ANTIDIABETIC] DRUGS, INITIAL ENCOUNTER: ICD-10-CM

## 2019-05-22 DIAGNOSIS — K85.30 DRUG INDUCED ACUTE PANCREATITIS WITHOUT NECROSIS OR INFECTION: ICD-10-CM

## 2019-05-22 DIAGNOSIS — E78.5 HYPERLIPIDEMIA, UNSPECIFIED: ICD-10-CM

## 2019-07-11 NOTE — ED PROVIDER NOTE - CONDITION AT DISCHARGE:
Discussed diet/exercise  Discussed nutrition referral-patient not interested at this time  Will return to check fasting lipids  
Phq9: 6  Patient uses breathing techniques and other exercises she learned from previous therapist which helps  No SI  Will monitor at this time  
Phq9: 6  tdap up to date  Pap up to date  GI order provided for colonoscopy  Mammogram order provided  Patient to return for fasting labs  Discussed shingles vaccine at pharmacy  
Will check CBC, TSH  Will check pelvic US  Will check FSH  
Improved

## 2019-08-12 NOTE — ED ADULT NURSE NOTE - CINV DISCH TEACH PARTICIP
"Fax received from [facility] requesting a refill of DULoxetine (CYMBALTA) 30 MG capsule on behalf of Indra Mehta \"Ketan\".  Last refill: 6/19/19  # 90 with 1 refills at [facility].  Last office visit:  8/8/19  Next office visit:  9/9/19    This is an appropriate refill, and has been e-prescribed. Sarahy Strauss CMA            " Patient

## 2020-08-11 ENCOUNTER — EMERGENCY (EMERGENCY)
Facility: HOSPITAL | Age: 70
LOS: 0 days | Discharge: HOME | End: 2020-08-12
Attending: EMERGENCY MEDICINE
Payer: COMMERCIAL

## 2020-08-11 VITALS — HEIGHT: 66 IN | WEIGHT: 179.9 LBS

## 2020-08-11 DIAGNOSIS — R10.31 RIGHT LOWER QUADRANT PAIN: ICD-10-CM

## 2020-08-11 DIAGNOSIS — E11.9 TYPE 2 DIABETES MELLITUS WITHOUT COMPLICATIONS: ICD-10-CM

## 2020-08-11 DIAGNOSIS — R10.10 UPPER ABDOMINAL PAIN, UNSPECIFIED: ICD-10-CM

## 2020-08-11 DIAGNOSIS — Z90.49 ACQUIRED ABSENCE OF OTHER SPECIFIED PARTS OF DIGESTIVE TRACT: ICD-10-CM

## 2020-08-11 DIAGNOSIS — I10 ESSENTIAL (PRIMARY) HYPERTENSION: ICD-10-CM

## 2020-08-11 DIAGNOSIS — Z79.4 LONG TERM (CURRENT) USE OF INSULIN: ICD-10-CM

## 2020-08-11 DIAGNOSIS — K85.80 OTHER ACUTE PANCREATITIS WITHOUT NECROSIS OR INFECTION: ICD-10-CM

## 2020-08-11 DIAGNOSIS — Z90.49 ACQUIRED ABSENCE OF OTHER SPECIFIED PARTS OF DIGESTIVE TRACT: Chronic | ICD-10-CM

## 2020-08-11 LAB
ADD ON TEST-SPECIMEN IN LAB: SIGNIFICANT CHANGE UP
ALBUMIN SERPL ELPH-MCNC: 3.2 G/DL — LOW (ref 3.3–5)
ALP SERPL-CCNC: 73 U/L — SIGNIFICANT CHANGE UP (ref 40–120)
ALT FLD-CCNC: 26 U/L — SIGNIFICANT CHANGE UP (ref 12–78)
ANION GAP SERPL CALC-SCNC: 7 MMOL/L — SIGNIFICANT CHANGE UP (ref 5–17)
AST SERPL-CCNC: 17 U/L — SIGNIFICANT CHANGE UP (ref 15–37)
BASOPHILS # BLD AUTO: 0.04 K/UL — SIGNIFICANT CHANGE UP (ref 0–0.2)
BASOPHILS NFR BLD AUTO: 0.5 % — SIGNIFICANT CHANGE UP (ref 0–2)
BILIRUB SERPL-MCNC: 0.2 MG/DL — SIGNIFICANT CHANGE UP (ref 0.2–1.2)
BUN SERPL-MCNC: 18 MG/DL — SIGNIFICANT CHANGE UP (ref 7–23)
CALCIUM SERPL-MCNC: 8.9 MG/DL — SIGNIFICANT CHANGE UP (ref 8.5–10.1)
CHLORIDE SERPL-SCNC: 109 MMOL/L — HIGH (ref 96–108)
CO2 SERPL-SCNC: 25 MMOL/L — SIGNIFICANT CHANGE UP (ref 22–31)
CREAT SERPL-MCNC: 1.02 MG/DL — SIGNIFICANT CHANGE UP (ref 0.5–1.3)
EOSINOPHIL # BLD AUTO: 0.12 K/UL — SIGNIFICANT CHANGE UP (ref 0–0.5)
EOSINOPHIL NFR BLD AUTO: 1.4 % — SIGNIFICANT CHANGE UP (ref 0–6)
GLUCOSE SERPL-MCNC: 100 MG/DL — HIGH (ref 70–99)
HCT VFR BLD CALC: 46.2 % — SIGNIFICANT CHANGE UP (ref 39–50)
HGB BLD-MCNC: 15.4 G/DL — SIGNIFICANT CHANGE UP (ref 13–17)
IMM GRANULOCYTES NFR BLD AUTO: 0.2 % — SIGNIFICANT CHANGE UP (ref 0–1.5)
LIDOCAIN IGE QN: 469 U/L — HIGH (ref 73–393)
LYMPHOCYTES # BLD AUTO: 3.59 K/UL — HIGH (ref 1–3.3)
LYMPHOCYTES # BLD AUTO: 42.8 % — SIGNIFICANT CHANGE UP (ref 13–44)
MCHC RBC-ENTMCNC: 29.7 PG — SIGNIFICANT CHANGE UP (ref 27–34)
MCHC RBC-ENTMCNC: 33.3 GM/DL — SIGNIFICANT CHANGE UP (ref 32–36)
MCV RBC AUTO: 89 FL — SIGNIFICANT CHANGE UP (ref 80–100)
MONOCYTES # BLD AUTO: 0.65 K/UL — SIGNIFICANT CHANGE UP (ref 0–0.9)
MONOCYTES NFR BLD AUTO: 7.7 % — SIGNIFICANT CHANGE UP (ref 2–14)
NEUTROPHILS # BLD AUTO: 3.97 K/UL — SIGNIFICANT CHANGE UP (ref 1.8–7.4)
NEUTROPHILS NFR BLD AUTO: 47.4 % — SIGNIFICANT CHANGE UP (ref 43–77)
PLATELET # BLD AUTO: 223 K/UL — SIGNIFICANT CHANGE UP (ref 150–400)
POTASSIUM SERPL-MCNC: 3.8 MMOL/L — SIGNIFICANT CHANGE UP (ref 3.5–5.3)
POTASSIUM SERPL-SCNC: 3.8 MMOL/L — SIGNIFICANT CHANGE UP (ref 3.5–5.3)
PROT SERPL-MCNC: 7.5 GM/DL — SIGNIFICANT CHANGE UP (ref 6–8.3)
RBC # BLD: 5.19 M/UL — SIGNIFICANT CHANGE UP (ref 4.2–5.8)
RBC # FLD: 12.6 % — SIGNIFICANT CHANGE UP (ref 10.3–14.5)
SODIUM SERPL-SCNC: 141 MMOL/L — SIGNIFICANT CHANGE UP (ref 135–145)
WBC # BLD: 8.39 K/UL — SIGNIFICANT CHANGE UP (ref 3.8–10.5)
WBC # FLD AUTO: 8.39 K/UL — SIGNIFICANT CHANGE UP (ref 3.8–10.5)

## 2020-08-11 PROCEDURE — 36415 COLL VENOUS BLD VENIPUNCTURE: CPT

## 2020-08-11 PROCEDURE — 93010 ELECTROCARDIOGRAM REPORT: CPT

## 2020-08-11 PROCEDURE — 74177 CT ABD & PELVIS W/CONTRAST: CPT

## 2020-08-11 PROCEDURE — 84484 ASSAY OF TROPONIN QUANT: CPT

## 2020-08-11 PROCEDURE — 85025 COMPLETE CBC W/AUTO DIFF WBC: CPT

## 2020-08-11 PROCEDURE — 83690 ASSAY OF LIPASE: CPT

## 2020-08-11 PROCEDURE — 96374 THER/PROPH/DIAG INJ IV PUSH: CPT | Mod: XU

## 2020-08-11 PROCEDURE — 74177 CT ABD & PELVIS W/CONTRAST: CPT | Mod: 26

## 2020-08-11 PROCEDURE — 80053 COMPREHEN METABOLIC PANEL: CPT

## 2020-08-11 PROCEDURE — 99284 EMERGENCY DEPT VISIT MOD MDM: CPT

## 2020-08-11 PROCEDURE — 99284 EMERGENCY DEPT VISIT MOD MDM: CPT | Mod: 25

## 2020-08-11 PROCEDURE — 93005 ELECTROCARDIOGRAM TRACING: CPT

## 2020-08-11 PROCEDURE — 96375 TX/PRO/DX INJ NEW DRUG ADDON: CPT

## 2020-08-11 RX ORDER — MORPHINE SULFATE 50 MG/1
4 CAPSULE, EXTENDED RELEASE ORAL ONCE
Refills: 0 | Status: DISCONTINUED | OUTPATIENT
Start: 2020-08-11 | End: 2020-08-11

## 2020-08-11 RX ORDER — ONDANSETRON 8 MG/1
4 TABLET, FILM COATED ORAL ONCE
Refills: 0 | Status: COMPLETED | OUTPATIENT
Start: 2020-08-11 | End: 2020-08-11

## 2020-08-11 RX ORDER — SODIUM CHLORIDE 9 MG/ML
1000 INJECTION, SOLUTION INTRAVENOUS ONCE
Refills: 0 | Status: COMPLETED | OUTPATIENT
Start: 2020-08-11 | End: 2020-08-11

## 2020-08-11 RX ADMIN — MORPHINE SULFATE 4 MILLIGRAM(S): 50 CAPSULE, EXTENDED RELEASE ORAL at 22:10

## 2020-08-11 RX ADMIN — ONDANSETRON 4 MILLIGRAM(S): 8 TABLET, FILM COATED ORAL at 21:32

## 2020-08-11 RX ADMIN — SODIUM CHLORIDE 2000 MILLILITER(S): 9 INJECTION, SOLUTION INTRAVENOUS at 21:32

## 2020-08-11 NOTE — ED STATDOCS - PROGRESS NOTE DETAILS
Pt feelign slightly better. States the medication helped and just has a little burning sensation. Offered pt admission for pancreatitis versus going home on medication and starting out with a liquid diet before progressing to solid foods and pt would like to go home. Will send percocet, pepcid and zofran to his pharmacy. Strict return precautions given. -Navarro Smith PA-C

## 2020-08-11 NOTE — ED STATDOCS - ATTENDING CONTRIBUTION TO CARE
I personally saw the patient with the PA, and completed the key components of the history and physical exam. I then discussed the management plan with the PA.   gen in nad resp clear cardiac no murmur abd mild ttp epigastric ruq luq no g/r/r no cvat bs nml neuro intact

## 2020-08-11 NOTE — ED STATDOCS - NSFOLLOWUPINSTRUCTIONS_ED_ALL_ED_FT
Pancreatitis    WHAT YOU NEED TO KNOW:    What is pancreatitis? Pancreatitis is inflammation of your pancreas. The pancreas is an organ that makes insulin. It also makes enzymes (digestive juices) that help your body digest food. Pancreatitis may be an acute (short-term) problem that happens only once. It may become a chronic (long-term) problem that comes and goes over time.Pancreas         What causes pancreatitis? Pancreatitis is usually caused by alcohol or gallstones. Less common causes are certain medicines, an injury to the abdomen, some procedures, and infections. High levels of triglycerides (fats) and calcium may also cause pancreatitis.    What are the signs and symptoms of pancreatitis?     Severe burning, stabbing, or aching pain that starts in the top of your abdomen and spreads to your back      Fever      Nausea and vomiting      Abdomen that is tender to the touch      Weight loss without trying    How is pancreatitis diagnosed? Your healthcare provider will examine you and ask about your symptoms. You may need any of the following:     Blood tests may show infection, pancreas function, or provide information about your overall health.      An x-ray, ultrasound, or CT may show the cause of your pancreatitis and help healthcare providers plan your treatment. You may be given contrast liquid to help your pancreas show up better in the pictures. Tell the healthcare provider if you have ever had an allergic reaction to contrast liquid.       Endoscopic retrograde cholangiopancreatography (ERCP) is a procedure used to find stones, tumors, or narrowed bile ducts. A long tube with a camera on the end is passed down your throat and into your stomach and abdomen.    How is pancreatitis treated? Treatment depends on the cause of your pancreatitis. You may need to stay in the hospital for treatment and more tests.    Medicines:   Prescription pain medicine may be given. Ask your healthcare provider how to take this medicine safely. Some prescription pain medicines contain acetaminophen. Do not take other medicines that contain acetaminophen without talking to your healthcare provider. Too much acetaminophen may cause liver damage. Prescription pain medicine may cause constipation. Ask your healthcare provider how to prevent or treat constipation.       Antibiotics may be given to treat a bacterial infection.      Surgery may be needed to open or widen blocked bile ducts or drain fluid from your pancreas. Your gallbladder may need to be removed if gallstones are causing your pancreatitis.    How can I manage my symptoms?     Rest when you feel it is needed. Slowly start to do more each day. Return to your usual activities as directed.      Do not drink any alcohol. If you need help to stop drinking, contact the following organization:     Alcoholics Anonymous  Web Address: http://www.aa.org          Ask your healthcare provider or dietitian about the best foods to eat. You may need to eat foods that are low in fat if you have chronic pancreatitis.      Do not smoke. Nicotine and other chemicals in cigarettes and cigars can cause damage. Ask your healthcare provider for information if you currently smoke and need help to quit. E-cigarettes or smokeless tobacco still contain nicotine. Talk to your healthcare provider before you use these products.     When should I call my doctor?     You have severe pain in your abdomen and you are vomiting.      You have a fever.       You continue to lose weight without trying.      Your skin or the whites of your eyes turn yellow.      You have questions or concerns about your condition or care.    CARE AGREEMENT:    You have the right to help plan your care. Learn about your health condition and how it may be treated. Discuss treatment options with your healthcare providers to decide what care you want to receive. You always have the right to refuse treatment.

## 2020-08-11 NOTE — ED STATDOCS - PATIENT PORTAL LINK FT
You can access the FollowMyHealth Patient Portal offered by [facility] by registering at the following website: [email or web address]. By joining [employer]’s FollowMyHealth portal, you will also be able to view your health information using other applications (apps) compatible with our system.

## 2020-08-11 NOTE — ED ADULT TRIAGE NOTE - CHIEF COMPLAINT QUOTE
Ambulatory from home complaining of epigastric pain/burning that started Friday. Patient states that it is worse every time he eats or drinks. Denies N/V/D, travel, sick contacts. EKG in progress.

## 2020-08-11 NOTE — ED STATDOCS - OBJECTIVE STATEMENT
71 yo male with a PMH of DM (on insulin and metformin) presents with upper abd pain. Pain radiates across the abdomen and to the RLQ. Pt states he had a similar episode last year and was dx with pancreatitis. Denies fever, cp, sob, n/v/d, bloody stools.   PMD= bridget

## 2020-08-12 VITALS — DIASTOLIC BLOOD PRESSURE: 74 MMHG | HEART RATE: 69 BPM | SYSTOLIC BLOOD PRESSURE: 154 MMHG

## 2020-08-12 RX ORDER — FAMOTIDINE 10 MG/ML
1 INJECTION INTRAVENOUS
Qty: 14 | Refills: 0
Start: 2020-08-12 | End: 2020-08-18

## 2020-08-12 RX ORDER — ONDANSETRON 8 MG/1
1 TABLET, FILM COATED ORAL
Qty: 15 | Refills: 0
Start: 2020-08-12 | End: 2020-08-16

## 2020-08-14 ENCOUNTER — INPATIENT (INPATIENT)
Facility: HOSPITAL | Age: 70
LOS: 2 days | Discharge: HOME | DRG: 440 | End: 2020-08-17
Attending: INTERNAL MEDICINE | Admitting: INTERNAL MEDICINE
Payer: COMMERCIAL

## 2020-08-14 VITALS — WEIGHT: 190.04 LBS | HEIGHT: 66 IN

## 2020-08-14 DIAGNOSIS — Z90.49 ACQUIRED ABSENCE OF OTHER SPECIFIED PARTS OF DIGESTIVE TRACT: Chronic | ICD-10-CM

## 2020-08-14 DIAGNOSIS — K85.90 ACUTE PANCREATITIS WITHOUT NECROSIS OR INFECTION, UNSPECIFIED: ICD-10-CM

## 2020-08-14 LAB
ALBUMIN SERPL ELPH-MCNC: 3.2 G/DL — LOW (ref 3.3–5)
ALP SERPL-CCNC: 76 U/L — SIGNIFICANT CHANGE UP (ref 40–120)
ALT FLD-CCNC: 48 U/L — SIGNIFICANT CHANGE UP (ref 12–78)
ANION GAP SERPL CALC-SCNC: 5 MMOL/L — SIGNIFICANT CHANGE UP (ref 5–17)
APPEARANCE UR: CLEAR — SIGNIFICANT CHANGE UP
APTT BLD: 29.9 SEC — SIGNIFICANT CHANGE UP (ref 27.5–35.5)
AST SERPL-CCNC: 49 U/L — HIGH (ref 15–37)
BASOPHILS # BLD AUTO: 0.05 K/UL — SIGNIFICANT CHANGE UP (ref 0–0.2)
BASOPHILS NFR BLD AUTO: 0.7 % — SIGNIFICANT CHANGE UP (ref 0–2)
BILIRUB SERPL-MCNC: 0.3 MG/DL — SIGNIFICANT CHANGE UP (ref 0.2–1.2)
BILIRUB UR-MCNC: NEGATIVE — SIGNIFICANT CHANGE UP
BUN SERPL-MCNC: 15 MG/DL — SIGNIFICANT CHANGE UP (ref 7–23)
CALCIUM SERPL-MCNC: 8.9 MG/DL — SIGNIFICANT CHANGE UP (ref 8.5–10.1)
CHLORIDE SERPL-SCNC: 105 MMOL/L — SIGNIFICANT CHANGE UP (ref 96–108)
CO2 SERPL-SCNC: 28 MMOL/L — SIGNIFICANT CHANGE UP (ref 22–31)
COLOR SPEC: YELLOW — SIGNIFICANT CHANGE UP
CREAT SERPL-MCNC: 1.03 MG/DL — SIGNIFICANT CHANGE UP (ref 0.5–1.3)
DIFF PNL FLD: ABNORMAL
EOSINOPHIL # BLD AUTO: 0.16 K/UL — SIGNIFICANT CHANGE UP (ref 0–0.5)
EOSINOPHIL NFR BLD AUTO: 2.1 % — SIGNIFICANT CHANGE UP (ref 0–6)
GLUCOSE SERPL-MCNC: 114 MG/DL — HIGH (ref 70–99)
GLUCOSE UR QL: 250 MG/DL
HCT VFR BLD CALC: 46.4 % — SIGNIFICANT CHANGE UP (ref 39–50)
HGB BLD-MCNC: 15.2 G/DL — SIGNIFICANT CHANGE UP (ref 13–17)
IMM GRANULOCYTES NFR BLD AUTO: 0.3 % — SIGNIFICANT CHANGE UP (ref 0–1.5)
INR BLD: 1.03 RATIO — SIGNIFICANT CHANGE UP (ref 0.88–1.16)
KETONES UR-MCNC: ABNORMAL
LACTATE SERPL-SCNC: 0.9 MMOL/L — SIGNIFICANT CHANGE UP (ref 0.7–2)
LEUKOCYTE ESTERASE UR-ACNC: NEGATIVE — SIGNIFICANT CHANGE UP
LIDOCAIN IGE QN: 324 U/L — SIGNIFICANT CHANGE UP (ref 73–393)
LYMPHOCYTES # BLD AUTO: 3.84 K/UL — HIGH (ref 1–3.3)
LYMPHOCYTES # BLD AUTO: 50.3 % — HIGH (ref 13–44)
MCHC RBC-ENTMCNC: 29.5 PG — SIGNIFICANT CHANGE UP (ref 27–34)
MCHC RBC-ENTMCNC: 32.8 GM/DL — SIGNIFICANT CHANGE UP (ref 32–36)
MCV RBC AUTO: 89.9 FL — SIGNIFICANT CHANGE UP (ref 80–100)
MONOCYTES # BLD AUTO: 0.6 K/UL — SIGNIFICANT CHANGE UP (ref 0–0.9)
MONOCYTES NFR BLD AUTO: 7.9 % — SIGNIFICANT CHANGE UP (ref 2–14)
NEUTROPHILS # BLD AUTO: 2.97 K/UL — SIGNIFICANT CHANGE UP (ref 1.8–7.4)
NEUTROPHILS NFR BLD AUTO: 38.7 % — LOW (ref 43–77)
NITRITE UR-MCNC: NEGATIVE — SIGNIFICANT CHANGE UP
PH UR: 6 — SIGNIFICANT CHANGE UP (ref 5–8)
PLATELET # BLD AUTO: 233 K/UL — SIGNIFICANT CHANGE UP (ref 150–400)
POTASSIUM SERPL-MCNC: 4 MMOL/L — SIGNIFICANT CHANGE UP (ref 3.5–5.3)
POTASSIUM SERPL-SCNC: 4 MMOL/L — SIGNIFICANT CHANGE UP (ref 3.5–5.3)
PROT SERPL-MCNC: 7.5 GM/DL — SIGNIFICANT CHANGE UP (ref 6–8.3)
PROT UR-MCNC: NEGATIVE MG/DL — SIGNIFICANT CHANGE UP
PROTHROM AB SERPL-ACNC: 11.9 SEC — SIGNIFICANT CHANGE UP (ref 10.6–13.6)
RBC # BLD: 5.16 M/UL — SIGNIFICANT CHANGE UP (ref 4.2–5.8)
RBC # FLD: 12.5 % — SIGNIFICANT CHANGE UP (ref 10.3–14.5)
SODIUM SERPL-SCNC: 138 MMOL/L — SIGNIFICANT CHANGE UP (ref 135–145)
SP GR SPEC: 1.01 — SIGNIFICANT CHANGE UP (ref 1.01–1.02)
UROBILINOGEN FLD QL: NEGATIVE MG/DL — SIGNIFICANT CHANGE UP
WBC # BLD: 7.64 K/UL — SIGNIFICANT CHANGE UP (ref 3.8–10.5)
WBC # FLD AUTO: 7.64 K/UL — SIGNIFICANT CHANGE UP (ref 3.8–10.5)

## 2020-08-14 PROCEDURE — 84478 ASSAY OF TRIGLYCERIDES: CPT

## 2020-08-14 PROCEDURE — 83036 HEMOGLOBIN GLYCOSYLATED A1C: CPT

## 2020-08-14 PROCEDURE — 71045 X-RAY EXAM CHEST 1 VIEW: CPT | Mod: 26

## 2020-08-14 PROCEDURE — 82962 GLUCOSE BLOOD TEST: CPT

## 2020-08-14 PROCEDURE — 36415 COLL VENOUS BLD VENIPUNCTURE: CPT

## 2020-08-14 PROCEDURE — 86803 HEPATITIS C AB TEST: CPT

## 2020-08-14 PROCEDURE — 81001 URINALYSIS AUTO W/SCOPE: CPT

## 2020-08-14 PROCEDURE — 80048 BASIC METABOLIC PNL TOTAL CA: CPT

## 2020-08-14 PROCEDURE — 93010 ELECTROCARDIOGRAM REPORT: CPT

## 2020-08-14 PROCEDURE — 83690 ASSAY OF LIPASE: CPT

## 2020-08-14 PROCEDURE — C9113: CPT

## 2020-08-14 PROCEDURE — 82150 ASSAY OF AMYLASE: CPT

## 2020-08-14 PROCEDURE — 99223 1ST HOSP IP/OBS HIGH 75: CPT | Mod: AI

## 2020-08-14 RX ORDER — ACETAMINOPHEN 500 MG
1000 TABLET ORAL ONCE
Refills: 0 | Status: COMPLETED | OUTPATIENT
Start: 2020-08-14 | End: 2020-08-14

## 2020-08-14 RX ORDER — MORPHINE SULFATE 50 MG/1
4 CAPSULE, EXTENDED RELEASE ORAL ONCE
Refills: 0 | Status: DISCONTINUED | OUTPATIENT
Start: 2020-08-14 | End: 2020-08-14

## 2020-08-14 RX ORDER — INSULIN LISPRO 100/ML
VIAL (ML) SUBCUTANEOUS EVERY 6 HOURS
Refills: 0 | Status: DISCONTINUED | OUTPATIENT
Start: 2020-08-14 | End: 2020-08-17

## 2020-08-14 RX ORDER — SODIUM CHLORIDE 9 MG/ML
1000 INJECTION INTRAMUSCULAR; INTRAVENOUS; SUBCUTANEOUS
Refills: 0 | Status: DISCONTINUED | OUTPATIENT
Start: 2020-08-14 | End: 2020-08-17

## 2020-08-14 RX ORDER — MORPHINE SULFATE 50 MG/1
2 CAPSULE, EXTENDED RELEASE ORAL EVERY 4 HOURS
Refills: 0 | Status: DISCONTINUED | OUTPATIENT
Start: 2020-08-14 | End: 2020-08-17

## 2020-08-14 RX ORDER — MORPHINE SULFATE 50 MG/1
3 CAPSULE, EXTENDED RELEASE ORAL EVERY 4 HOURS
Refills: 0 | Status: DISCONTINUED | OUTPATIENT
Start: 2020-08-14 | End: 2020-08-17

## 2020-08-14 RX ORDER — PANTOPRAZOLE SODIUM 20 MG/1
40 TABLET, DELAYED RELEASE ORAL EVERY 12 HOURS
Refills: 0 | Status: DISCONTINUED | OUTPATIENT
Start: 2020-08-14 | End: 2020-08-17

## 2020-08-14 RX ORDER — ACETAMINOPHEN 500 MG
650 TABLET ORAL EVERY 6 HOURS
Refills: 0 | Status: DISCONTINUED | OUTPATIENT
Start: 2020-08-14 | End: 2020-08-17

## 2020-08-14 RX ORDER — SODIUM CHLORIDE 9 MG/ML
1000 INJECTION, SOLUTION INTRAVENOUS
Refills: 0 | Status: DISCONTINUED | OUTPATIENT
Start: 2020-08-14 | End: 2020-08-17

## 2020-08-14 RX ORDER — AMLODIPINE BESYLATE 2.5 MG/1
5 TABLET ORAL DAILY
Refills: 0 | Status: DISCONTINUED | OUTPATIENT
Start: 2020-08-14 | End: 2020-08-17

## 2020-08-14 RX ORDER — DEXTROSE 50 % IN WATER 50 %
12.5 SYRINGE (ML) INTRAVENOUS ONCE
Refills: 0 | Status: DISCONTINUED | OUTPATIENT
Start: 2020-08-14 | End: 2020-08-17

## 2020-08-14 RX ORDER — DEXTROSE 50 % IN WATER 50 %
25 SYRINGE (ML) INTRAVENOUS ONCE
Refills: 0 | Status: DISCONTINUED | OUTPATIENT
Start: 2020-08-14 | End: 2020-08-17

## 2020-08-14 RX ORDER — DEXTROSE 50 % IN WATER 50 %
15 SYRINGE (ML) INTRAVENOUS ONCE
Refills: 0 | Status: DISCONTINUED | OUTPATIENT
Start: 2020-08-14 | End: 2020-08-17

## 2020-08-14 RX ORDER — GLUCAGON INJECTION, SOLUTION 0.5 MG/.1ML
1 INJECTION, SOLUTION SUBCUTANEOUS ONCE
Refills: 0 | Status: DISCONTINUED | OUTPATIENT
Start: 2020-08-14 | End: 2020-08-17

## 2020-08-14 RX ORDER — HEPARIN SODIUM 5000 [USP'U]/ML
5000 INJECTION INTRAVENOUS; SUBCUTANEOUS EVERY 8 HOURS
Refills: 0 | Status: DISCONTINUED | OUTPATIENT
Start: 2020-08-14 | End: 2020-08-17

## 2020-08-14 RX ORDER — ONDANSETRON 8 MG/1
4 TABLET, FILM COATED ORAL EVERY 6 HOURS
Refills: 0 | Status: DISCONTINUED | OUTPATIENT
Start: 2020-08-14 | End: 2020-08-17

## 2020-08-14 RX ORDER — FAMOTIDINE 10 MG/ML
20 INJECTION INTRAVENOUS ONCE
Refills: 0 | Status: COMPLETED | OUTPATIENT
Start: 2020-08-14 | End: 2020-08-14

## 2020-08-14 RX ORDER — SODIUM CHLORIDE 9 MG/ML
1000 INJECTION INTRAMUSCULAR; INTRAVENOUS; SUBCUTANEOUS ONCE
Refills: 0 | Status: COMPLETED | OUTPATIENT
Start: 2020-08-14 | End: 2020-08-14

## 2020-08-14 RX ORDER — SENNA PLUS 8.6 MG/1
2 TABLET ORAL AT BEDTIME
Refills: 0 | Status: DISCONTINUED | OUTPATIENT
Start: 2020-08-14 | End: 2020-08-17

## 2020-08-14 RX ORDER — INSULIN GLARGINE 100 [IU]/ML
16 INJECTION, SOLUTION SUBCUTANEOUS AT BEDTIME
Refills: 0 | Status: DISCONTINUED | OUTPATIENT
Start: 2020-08-14 | End: 2020-08-17

## 2020-08-14 RX ADMIN — FAMOTIDINE 20 MILLIGRAM(S): 10 INJECTION INTRAVENOUS at 17:25

## 2020-08-14 RX ADMIN — MORPHINE SULFATE 4 MILLIGRAM(S): 50 CAPSULE, EXTENDED RELEASE ORAL at 17:22

## 2020-08-14 RX ADMIN — AMLODIPINE BESYLATE 5 MILLIGRAM(S): 2.5 TABLET ORAL at 23:46

## 2020-08-14 RX ADMIN — Medication 1000 MILLIGRAM(S): at 17:22

## 2020-08-14 RX ADMIN — PANTOPRAZOLE SODIUM 40 MILLIGRAM(S): 20 TABLET, DELAYED RELEASE ORAL at 22:27

## 2020-08-14 RX ADMIN — SODIUM CHLORIDE 1000 MILLILITER(S): 9 INJECTION INTRAMUSCULAR; INTRAVENOUS; SUBCUTANEOUS at 17:21

## 2020-08-14 RX ADMIN — HEPARIN SODIUM 5000 UNIT(S): 5000 INJECTION INTRAVENOUS; SUBCUTANEOUS at 22:27

## 2020-08-14 RX ADMIN — ONDANSETRON 4 MILLIGRAM(S): 8 TABLET, FILM COATED ORAL at 17:22

## 2020-08-14 NOTE — ED STATDOCS - OBJECTIVE STATEMENT
Pertinent HPI/PMH/PSH/FHx/SHx and Review of Systems contained within  HPI:  Patient p/w CC upper abd pain, epigastric, RUQ and LUQ  x 7 days. Denies N/V/D, fevers, cough, urinary symptoms. Pt states that his pain is worse with eating, Pt was seen in the ED three days ago for similar pain. Pt had labs and imaging done and was diagnosed with pancreatitis. Pt was discharged with oxycodone. Pt states that he is still having the pain, states that the pain is getting worse. Pt states that he has hd pancreatitis in the past, last was last year. Pt states that his current pain feels like his pancreatitis pain. Pt states that he was seen by his PCP last night and was told to return to the ED.   PMH/PSH relevant for: HTN, DM, Pancreatitis, s/p cholecystectomy  PCP- Yuri [facility] negative for: fever, Chest pain, SOB, Nausea, vomiting, diarrhea, dysuria    FamilyHx and SocialHx not otherwise contributory

## 2020-08-14 NOTE — ED ADULT NURSE NOTE - NSIMPLEMENTINTERV_GEN_ALL_ED
Implemented All Universal Safety Interventions:  [place] to call system. Call bell, personal items and telephone within reach. Instruct patient to call for assistance. Room bathroom lighting operational. Non-slip footwear when patient is off stretcher. Physically safe environment: no spills, clutter or unnecessary equipment. Stretcher in lowest position, wheels locked, appropriate side rails in place.

## 2020-08-14 NOTE — ED STATDOCS - CLINICAL SUMMARY MEDICAL DECISION MAKING FREE TEXT BOX
failed op pancreatitis treatment, pt feels like pain is worse, pt likely to be admitted. failed outpt pancreatitis treatment, pt feels like pain is worse, pt likely to be admitted.

## 2020-08-14 NOTE — ED STATDOCS - PHYSICAL EXAMINATION
*GEN: No acute distress, well appearing   *HEAD: Normocephalic, Atraumatic  *EYES/NOSE: b/l Pupils symmetric & Reactive to ligth, EOMI b/l  *THROAT: airway patent, moist mucous membranes  *NECK: Neck supple  *PULMONARY: No Respiratory distress, symmetric b/l chest rise  *CARDIAC: s1s2, regular rhythm   *ABDOMEN:  Non Distended, soft, no guarding, no rebound, no masses +epigastric TTP  *BACK: no CVA tenderness, No midline vertebral tenderness to palpation   *EXTREMITIES: symmetric pulses, 2+ DP & radial pulses, no cyanosis, no edema   *SKIN: no rash, no bruising   *NEUROLOGIC: alert,  full active & passive ROM in all 4 extremities,   *PSYCH: appropriate concern about symptoms, pleasant

## 2020-08-14 NOTE — H&P ADULT - ASSESSMENT
#Epigastric abdominal pain  Likely 2 to acute pancreatitis  R/o PUD  Admit to med-surg  Imaging studies reviewed  Mild elevation of lipase that resolved, but clinically still symptomatic- ?PUD  GI eval DR AI Bass  Start IV Protonix BID  Pain meds prn  NPO exc meds  IVF  May need ?EGD  Monitor electrolytes    #Diabetes  Reduce Lantus to 16 Units QHS while NPO (usual 30Units qHs)  ISS  Adjust based on FS    #HTN- cont home meds    #COVID pending, low suspicion    #DVT proph- heparin SQ    #Dispo- inpatient admit, d/w pt

## 2020-08-14 NOTE — ED STATDOCS - ATTENDING CONTRIBUTION TO CARE
I, Jeremiah Mederos MD,  performed the initial face to face bedside interview with this patient regarding history of present illness, review of symptoms and relevant past medical, social and family history.  I completed an independent physical examination.  I was the initial provider who evaluated this patient. I have signed out the follow up of any pending tests (i.e. labs, radiological studies) to the ACP.  The ACP will complete the work up, interpret tests, administer medications if necessary and reassess the patient for an appropriate disposition.  If questions arise the ACP is expected to contact me for consultation. In the current work-flow I usually don't get to speak to nor reassess patients for final disposition once I sign the case out to the ACP (Unless otherwise documented).

## 2020-08-14 NOTE — H&P ADULT - NSHPLABSRESULTS_GEN_ALL_CORE
15.2   7.64  )-----------( 233      ( 14 Aug 2020 17:10 )             46.4     14 Aug 2020 17:10    138    |  105    |  15     ----------------------------<  114    4.0     |  28     |  1.03     Ca    8.9        14 Aug 2020 17:10    TPro  7.5    /  Alb  3.2    /  TBili  0.3    /  DBili  x      /  AST  49     /  ALT  48     /  AlkPhos  76     14 Aug 2020 17:10    LIVER FUNCTIONS - ( 14 Aug 2020 17:10 )  Alb: 3.2 g/dL / Pro: 7.5 gm/dL / ALK PHOS: 76 U/L / ALT: 48 U/L / AST: 49 U/L / GGT: x           PT/INR - ( 14 Aug 2020 17:10 )   PT: 11.9 sec;   INR: 1.03 ratio      PTT - ( 14 Aug 2020 17:10 )  PTT:29.9 sec

## 2020-08-14 NOTE — ED ADULT NURSE NOTE - CHPI ED NUR SYMPTOMS NEG
no abdominal distension/no dysuria/no fever/no hematuria/no nausea/no vomiting/no burning urination/no blood in stool/no chills/no diarrhea

## 2020-08-14 NOTE — ED ADULT TRIAGE NOTE - CHIEF COMPLAINT QUOTE
c/o worsening chest pain radiating to back, recently diagnosed with chest pain, o2 sat in triage 95% on RA, pulse 89

## 2020-08-14 NOTE — H&P ADULT - HISTORY OF PRESENT ILLNESS
71yo/M with PMH HTN, diabetes, prior h/o pancreatitis [resented with epigastric abd pain. Pain started about 4-5 days ago, was seen in ED and diagnosed with acute pancreatitis but did not want to stay. At home pain continues, radiates from epigastric area around to the back, worse after eating or drinking, better on the empty stomach. Patient states he could not eat anything past few days and came back to ED. Denies excessive alcohol intake. Prior h/o lap cholecystectomy

## 2020-08-14 NOTE — ED ADULT NURSE NOTE - OBJECTIVE STATEMENT
presents to ed with pancreatitis. patient states was just discharged from hospital for pancreatitis, went home and the pain came back, patient took oxycodone at home with no relief. will continue to monitor for safety and comfort.

## 2020-08-14 NOTE — ED STATDOCS - CARE PLAN
Principal Discharge DX:	Pancreatitis Principal Discharge DX:	Pancreatitis  Secondary Diagnosis:	Intractable abdominal pain

## 2020-08-14 NOTE — ED STATDOCS - PROGRESS NOTE DETAILS
Patient seen and evaluated.  Despite improved lipase level, patient still having the same pain, unable to tolerate PO at all at home secondary to pain.  Patient would like to be admitted for IVF at this time.  Case d/w Dr. Smith for admission -Lawrence Miles PA-C

## 2020-08-14 NOTE — ED STATDOCS - NS ED ROS FT
Review of Systems:  	•	CONSTITUTIONAL: no fever  	•	SKIN: no rash  	•	RESPIRATORY: no shortness of breath  	•	CARDIAC: no chest pain, no palpitations  	•	GI:   no nausea, no vomiting, no diarrhea +upper abd pain  	•	GENITO-URINARY:  no dysuria; no hematuria    	•	MUSCULOSKELETAL:  no back pain  	•	NEUROLOGIC: no weakness  	•	ALLERGY: no rhinitis  	•	PSYSCHIATRIC: no anxiety

## 2020-08-14 NOTE — H&P ADULT - NSHPPHYSICALEXAM_GEN_ALL_CORE
Vital Signs Last 24 Hrs  T(C): 37.1 (14 Aug 2020 16:20), Max: 37.1 (14 Aug 2020 16:20)  T(F): 98.8 (14 Aug 2020 16:20), Max: 98.8 (14 Aug 2020 16:20)  HR: 73 (14 Aug 2020 16:20) (73 - 73)  BP: 157/85 (14 Aug 2020 16:20) (157/85 - 157/85)  BP(mean): 95 (14 Aug 2020 16:20) (95 - 95)  RR: 17 (14 Aug 2020 16:20) (17 - 17)  SpO2: 95% (14 Aug 2020 16:20) (95% - 95%)

## 2020-08-15 LAB
A1C WITH ESTIMATED AVERAGE GLUCOSE RESULT: 9.8 % — HIGH (ref 4–5.6)
AMYLASE P1 CFR SERPL: 56 U/L — SIGNIFICANT CHANGE UP (ref 25–115)
ANION GAP SERPL CALC-SCNC: 4 MMOL/L — LOW (ref 5–17)
BUN SERPL-MCNC: 9 MG/DL — SIGNIFICANT CHANGE UP (ref 7–23)
CALCIUM SERPL-MCNC: 8.8 MG/DL — SIGNIFICANT CHANGE UP (ref 8.5–10.1)
CHLORIDE SERPL-SCNC: 107 MMOL/L — SIGNIFICANT CHANGE UP (ref 96–108)
CO2 SERPL-SCNC: 27 MMOL/L — SIGNIFICANT CHANGE UP (ref 22–31)
CREAT SERPL-MCNC: 1 MG/DL — SIGNIFICANT CHANGE UP (ref 0.5–1.3)
ESTIMATED AVERAGE GLUCOSE: 235 MG/DL — HIGH (ref 68–114)
GLUCOSE SERPL-MCNC: 103 MG/DL — HIGH (ref 70–99)
HCV AB S/CO SERPL IA: 0.28 S/CO — SIGNIFICANT CHANGE UP (ref 0–0.99)
HCV AB SERPL-IMP: SIGNIFICANT CHANGE UP
LIDOCAIN IGE QN: 207 U/L — SIGNIFICANT CHANGE UP (ref 73–393)
POTASSIUM SERPL-MCNC: 4.5 MMOL/L — SIGNIFICANT CHANGE UP (ref 3.5–5.3)
POTASSIUM SERPL-SCNC: 4.5 MMOL/L — SIGNIFICANT CHANGE UP (ref 3.5–5.3)
SARS-COV-2 IGG SERPL QL IA: POSITIVE
SARS-COV-2 IGM SERPL IA-ACNC: 133 AU/ML — HIGH
SARS-COV-2 RNA SPEC QL NAA+PROBE: SIGNIFICANT CHANGE UP
SODIUM SERPL-SCNC: 138 MMOL/L — SIGNIFICANT CHANGE UP (ref 135–145)

## 2020-08-15 PROCEDURE — 99233 SBSQ HOSP IP/OBS HIGH 50: CPT

## 2020-08-15 RX ORDER — LIPASE/PROTEASE/AMYLASE 16-48-48K
2 CAPSULE,DELAYED RELEASE (ENTERIC COATED) ORAL
Refills: 0 | Status: DISCONTINUED | OUTPATIENT
Start: 2020-08-15 | End: 2020-08-17

## 2020-08-15 RX ORDER — SUCRALFATE 1 G
1 TABLET ORAL
Refills: 0 | Status: DISCONTINUED | OUTPATIENT
Start: 2020-08-15 | End: 2020-08-17

## 2020-08-15 RX ADMIN — MORPHINE SULFATE 3 MILLIGRAM(S): 50 CAPSULE, EXTENDED RELEASE ORAL at 08:57

## 2020-08-15 RX ADMIN — HEPARIN SODIUM 5000 UNIT(S): 5000 INJECTION INTRAVENOUS; SUBCUTANEOUS at 14:20

## 2020-08-15 RX ADMIN — MORPHINE SULFATE 3 MILLIGRAM(S): 50 CAPSULE, EXTENDED RELEASE ORAL at 23:17

## 2020-08-15 RX ADMIN — PANTOPRAZOLE SODIUM 40 MILLIGRAM(S): 20 TABLET, DELAYED RELEASE ORAL at 08:51

## 2020-08-15 RX ADMIN — Medication 2 CAPSULE(S): at 16:38

## 2020-08-15 RX ADMIN — SODIUM CHLORIDE 125 MILLILITER(S): 9 INJECTION, SOLUTION INTRAVENOUS at 16:36

## 2020-08-15 RX ADMIN — PANTOPRAZOLE SODIUM 40 MILLIGRAM(S): 20 TABLET, DELAYED RELEASE ORAL at 23:18

## 2020-08-15 RX ADMIN — SODIUM CHLORIDE 125 MILLILITER(S): 9 INJECTION, SOLUTION INTRAVENOUS at 06:08

## 2020-08-15 RX ADMIN — HEPARIN SODIUM 5000 UNIT(S): 5000 INJECTION INTRAVENOUS; SUBCUTANEOUS at 23:18

## 2020-08-15 RX ADMIN — MORPHINE SULFATE 3 MILLIGRAM(S): 50 CAPSULE, EXTENDED RELEASE ORAL at 23:30

## 2020-08-15 RX ADMIN — MORPHINE SULFATE 2 MILLIGRAM(S): 50 CAPSULE, EXTENDED RELEASE ORAL at 00:20

## 2020-08-15 RX ADMIN — INSULIN GLARGINE 16 UNIT(S): 100 INJECTION, SOLUTION SUBCUTANEOUS at 23:18

## 2020-08-15 RX ADMIN — MORPHINE SULFATE 2 MILLIGRAM(S): 50 CAPSULE, EXTENDED RELEASE ORAL at 00:35

## 2020-08-15 RX ADMIN — Medication 1 GRAM(S): at 16:38

## 2020-08-15 RX ADMIN — AMLODIPINE BESYLATE 5 MILLIGRAM(S): 2.5 TABLET ORAL at 08:51

## 2020-08-15 RX ADMIN — MORPHINE SULFATE 3 MILLIGRAM(S): 50 CAPSULE, EXTENDED RELEASE ORAL at 09:12

## 2020-08-15 RX ADMIN — HEPARIN SODIUM 5000 UNIT(S): 5000 INJECTION INTRAVENOUS; SUBCUTANEOUS at 06:06

## 2020-08-15 NOTE — PATIENT PROFILE ADULT - NSPROEDALEARNPREF_GEN_A_NUR
Platelet count 15  Pt arrived to dept for one unit of irradiated platelets  Is aware that his white count is also low  Reviewed precautions with pt  Iv inserted without difficulty   Pt premedicated with benadryl at his request  written material/verbal instruction

## 2020-08-15 NOTE — CONSULT NOTE ADULT - ASSESSMENT
recurrent pancreatitis   epig pain    add carafate one gram tid  pantoprazole 40 mg daily  add creon 24 K two capsules tid with meals  check igg 4 r/o autoimmune pancreatitis  check triglyceride level  he was advised to follow up with Dr Joe Mann next week  juans

## 2020-08-15 NOTE — CONSULT NOTE ADULT - SUBJECTIVE AND OBJECTIVE BOX
HPI:  69yo/M with PMH HTN, diabetes, prior h/o pancreatitis and cholecystectomy   pt had acute pancreatitis one year ago  now with recurrent epig pain radiating to back aggravated after eating particularly fatty food or rich meal  mild nausea but no vomiting  no fever or chills  recent motrin to relieve epig pain taken by pt  no jaundice  no alcohol intake  denies heartburn or dysphagia  pt follows by Dr. JENI Mann    bm's reported as regular  no diarrhea or oily stools or wt loss    PAST MEDICAL & SURGICAL HISTORY:  DM (diabetes mellitus)  HTN (hypertension)  Pancreatitis  S/P laparoscopic cholecystectomy      Allergies    No Known Allergies    Intolerances        MEDICATIONS  (STANDING):  amLODIPine   Tablet 5 milliGRAM(s) Oral daily  dextrose 5%. 1000 milliLiter(s) (50 mL/Hr) IV Continuous <Continuous>  dextrose 50% Injectable 12.5 Gram(s) IV Push once  dextrose 50% Injectable 25 Gram(s) IV Push once  dextrose 50% Injectable 25 Gram(s) IV Push once  heparin   Injectable 5000 Unit(s) SubCutaneous every 8 hours  insulin glargine Injectable (LANTUS) 16 Unit(s) SubCutaneous at bedtime  insulin lispro (HumaLOG) corrective regimen sliding scale   SubCutaneous every 6 hours  lactated ringers. 1000 milliLiter(s) (125 mL/Hr) IV Continuous <Continuous>  pantoprazole  Injectable 40 milliGRAM(s) IV Push every 12 hours  sodium chloride 0.9%. 1000 milliLiter(s) (125 mL/Hr) IV Continuous <Continuous>    MEDICATIONS  (PRN):  acetaminophen   Tablet .. 650 milliGRAM(s) Oral every 6 hours PRN Mild Pain (1 - 3)  aluminum hydroxide/magnesium hydroxide/simethicone Suspension 30 milliLiter(s) Oral every 4 hours PRN Dyspepsia  dextrose 40% Gel 15 Gram(s) Oral once PRN Blood Glucose LESS THAN 70 milliGRAM(s)/deciliter  glucagon  Injectable 1 milliGRAM(s) IntraMuscular once PRN Glucose LESS THAN 70 milligrams/deciliter  morphine  - Injectable 2 milliGRAM(s) IV Push every 4 hours PRN Moderate Pain (4 - 6)  morphine  - Injectable 3 milliGRAM(s) IV Push every 4 hours PRN Severe Pain (7 - 10)  ondansetron Injectable 4 milliGRAM(s) IV Push every 6 hours PRN Nausea and/or Vomiting  senna 2 Tablet(s) Oral at bedtime PRN Constipation      FAMILY HISTORY:  Family history of diabetes mellitus in brother    neg for pancreatitis or pancreatic cancer   Social History:  no etoh  REVIEW OF SYSTEMS      General:	No fever or chills    Skin/Breast: No jaundice or rash   		  ENMT: denies sore throat or thrush    Respiratory and Thorax: Denies dyspnea or cough or shortness of breath  	  Cardiovascular: Denies chest pain or palpitations 	    Gastrointestinal: Denies jaundice or pruritis    Genitourinary: Denies dysuria or hematuria	    Musculoskeletal: Denies muscular pain or swelling	    Neurological: Denies confusion or tremor	    Hematology/Lymphatics: Denies easy bruising or bleeding 	    Endocrine:	Denies polyphagia or polyuria    See above hx otherwise neg for any major organ systems    PHYSICAL EXAM:    Vital Signs Last 24 Hrs  T(C): 36.3 (15 Aug 2020 09:01), Max: 37.1 (14 Aug 2020 16:20)  T(F): 97.4 (15 Aug 2020 09:01), Max: 98.8 (14 Aug 2020 16:20)  HR: 72 (15 Aug 2020 11:59) (61 - 73)  BP: 144/76 (15 Aug 2020 11:59) (141/82 - 166/84)  BP(mean): 128 (14 Aug 2020 21:49) (95 - 128)  RR: 16 (15 Aug 2020 09:01) (16 - 18)  SpO2: 99% (15 Aug 2020 09:01) (95% - 99%)    Constitutional: no acute distress    ENMT: NC/AT scl anicteric opm pink no lesions     Neck: supple. No jvd or LN    Respiratory: Clear     Cardiovascular: RRR s1s2     Gastrointestinal: Pos bs , soft , not tender, no hepatosplenomegaly,  no mass  (after given morphine)    Rectal: pt deferred  Back: No CVA tenderness    Extremities: NO cce     Neurological: Alert and oriented x 3     Skin: No rash or jaundice     Date/Time:08-15 @ 08:48    Albumin: --  ALT/SGPT: --  Alk Phos: --  AST/SGOT: --  Bilirubin Direct: --  Bilirubin Total: --  Ca: 8.8  eGFR : 87  eGFR Non-: 75  Lipase: 207  Amylase: 56  INR: --  PTT: --  Date/Time:08-14 @ 17:10    Albumin: 3.2  ALT/SGPT: 48  Alk Phos: 76  AST/SGOT: 49  Bilirubin Direct: --  Bilirubin Total: 0.3  Ca: 8.9  eGFR : 84  eGFR Non-: 72  Lipase: 324  Amylase: --  INR: 1.03  PTT: --      08-15    138  |  107  |  9   ----------------------------<  103<H>  4.5   |  27  |  1.00    Ca    8.8      15 Aug 2020 08:48    TPro  7.5  /  Alb  3.2<L>  /  TBili  0.3  /  DBili  x   /  AST  49<H>  /  ALT  48  /  AlkPhos  76  08-14                            15.2   7.64  )-----------( 233      ( 14 Aug 2020 17:10 )             46.4   Lipase, Serum: 469 U/L (08.11.20 @ 21:29)    Lipase, Serum: 207 U/L (08.15.20 @ 08:48)    INR: 1.03: Recommended ranges for therapeutic INR:    2.0-3.0 for most medical and surgical thromboembolic states    2.0-3.0 for atrial fibrillation    2.0-3.0 for bileaflet mechanical valve in aortic position    2.5-3.5 for mechanical heart valves    Chest 2004;[id number]  The presence of direct thrombin inhibitors (argatroban, refludan)  may falsely increase results. ratio (08.14.20 @ 17:10)    < from: CT Abdomen and Pelvis w/ IV Cont (08.11.20 @ 22:42) >  EXAM:  CT ABDOMEN AND PELVIS IC                            PROCEDURE DATE:  08/11/2020          INTERPRETATION:  CLINICAL INFORMATION: Upper abdominal right lower quadrant pain.    COMPARISON: CT abdomen/pelvis 5/12/2019.    PROCEDURE:  CT of the Abdomen and Pelvis was performed with intravenous contrast.  Intravenous contrast: 90 ml Omnipaque 350. 10 ml discarded.  Oral contrast: None.  Sagittal and coronal reformats were performed.    FINDINGS:  LOWER CHEST: Mild bibasilar subsegmental atelectasis versus scarring.    LIVER: Punctate calcified granuloma in the right inferior lobe.  BILE DUCTS: Normal caliber.  GALLBLADDER: Within normal limits.  SPLEEN: Punctate calcified granuloma.  PANCREAS: Subtle fat stranding in the surrounding the the pancreatic head/uncinate process, concerning for interstitial edematous pancreatitis. Normal pancreatic parenchymal enhancement. No fluid collections.  ADRENALS: Within normal limits.  KIDNEYS/URETERS: Within normal limits.    BLADDER: Within normallimits.  REPRODUCTIVE ORGANS: Enlarged prostate.    BOWEL: No bowel obstruction. Appendix is normal.  PERITONEUM: No ascites.  VESSELS: Within normal limits.  RETROPERITONEUM/LYMPH NODES: No lymphadenopathy.  ABDOMINAL WALL: Fat-containing umbilical hernia.  BONES: Degenerative changes.    IMPRESSION:  Mild fatty infiltration in the region of the pancreatic head/uncinate process most compatible with interstitial edematous pancreatitis, recommend correlation with pancreatic enzymes.                MARTA GLYNN MD; Attending Radiologist  This document has been electronically signed. Aug 11 2020 11:45PM                < end of copied text >

## 2020-08-16 LAB — TRIGL SERPL-MCNC: 68 MG/DL — SIGNIFICANT CHANGE UP (ref 10–149)

## 2020-08-16 PROCEDURE — 99232 SBSQ HOSP IP/OBS MODERATE 35: CPT

## 2020-08-16 RX ORDER — POLYETHYLENE GLYCOL 3350 17 G/17G
17 POWDER, FOR SOLUTION ORAL DAILY
Refills: 0 | Status: DISCONTINUED | OUTPATIENT
Start: 2020-08-16 | End: 2020-08-17

## 2020-08-16 RX ADMIN — Medication 2 CAPSULE(S): at 17:00

## 2020-08-16 RX ADMIN — Medication 1 GRAM(S): at 09:37

## 2020-08-16 RX ADMIN — INSULIN GLARGINE 16 UNIT(S): 100 INJECTION, SOLUTION SUBCUTANEOUS at 21:28

## 2020-08-16 RX ADMIN — SENNA PLUS 2 TABLET(S): 8.6 TABLET ORAL at 21:25

## 2020-08-16 RX ADMIN — HEPARIN SODIUM 5000 UNIT(S): 5000 INJECTION INTRAVENOUS; SUBCUTANEOUS at 13:32

## 2020-08-16 RX ADMIN — PANTOPRAZOLE SODIUM 40 MILLIGRAM(S): 20 TABLET, DELAYED RELEASE ORAL at 21:26

## 2020-08-16 RX ADMIN — Medication 4: at 11:35

## 2020-08-16 RX ADMIN — SODIUM CHLORIDE 125 MILLILITER(S): 9 INJECTION, SOLUTION INTRAVENOUS at 05:14

## 2020-08-16 RX ADMIN — AMLODIPINE BESYLATE 5 MILLIGRAM(S): 2.5 TABLET ORAL at 08:12

## 2020-08-16 RX ADMIN — HEPARIN SODIUM 5000 UNIT(S): 5000 INJECTION INTRAVENOUS; SUBCUTANEOUS at 21:26

## 2020-08-16 RX ADMIN — SODIUM CHLORIDE 125 MILLILITER(S): 9 INJECTION, SOLUTION INTRAVENOUS at 17:49

## 2020-08-16 RX ADMIN — Medication 4: at 21:25

## 2020-08-16 RX ADMIN — HEPARIN SODIUM 5000 UNIT(S): 5000 INJECTION INTRAVENOUS; SUBCUTANEOUS at 05:14

## 2020-08-16 RX ADMIN — Medication 2 CAPSULE(S): at 11:38

## 2020-08-16 RX ADMIN — Medication 2 CAPSULE(S): at 08:12

## 2020-08-16 RX ADMIN — SODIUM CHLORIDE 125 MILLILITER(S): 9 INJECTION, SOLUTION INTRAVENOUS at 09:38

## 2020-08-16 RX ADMIN — PANTOPRAZOLE SODIUM 40 MILLIGRAM(S): 20 TABLET, DELAYED RELEASE ORAL at 09:37

## 2020-08-16 RX ADMIN — Medication 1 GRAM(S): at 21:26

## 2020-08-16 RX ADMIN — POLYETHYLENE GLYCOL 3350 17 GRAM(S): 17 POWDER, FOR SOLUTION ORAL at 15:11

## 2020-08-16 NOTE — PROGRESS NOTE ADULT - ASSESSMENT
recurrent pancreatitis of unclear origin      await igg subset results to look for igg4 level    adv diet low fat diet  creon 24K two capsules with each meal  ppi  carafate  miralax one capful mixed in 8 ounces of water daily for constipation  outpt follow up with primary gastroenterologist Dr. Joe Mann advised.

## 2020-08-16 NOTE — PROGRESS NOTE ADULT - SUBJECTIVE AND OBJECTIVE BOX
69yo/M with PMH HTN, diabetes, prior h/o pancreatitis [resented with epigastric abd pain. Pain started about 4-5 days ago, was seen in ED and diagnosed with acute pancreatitis but did not want to stay. At home pain continues, radiates from epigastric area around to the back, worse after eating or drinking, better on the empty stomach. Patient states he could not eat anything past few days and came back to ED. Denies excessive alcohol intake. Prior h/o lap cholecystectomy.      08/15/20: Patient seen and examined. Still with upper abd pain. Discussed with patient in length regarding management and d/c plan.       Vital Signs Last 24 Hrs  T(C): 36.3 (15 Aug 2020 09:01), Max: 37.1 (14 Aug 2020 16:20)  T(F): 97.4 (15 Aug 2020 09:01), Max: 98.8 (14 Aug 2020 16:20)  HR: 72 (15 Aug 2020 11:59) (61 - 73)  BP: 144/76 (15 Aug 2020 11:59) (141/82 - 166/84)  BP(mean): 128 (14 Aug 2020 21:49) (95 - 128)  RR: 16 (15 Aug 2020 09:01) (16 - 18)  SpO2: 99% (15 Aug 2020 09:01) (95% - 99%)        Physical Exam:  · Constitutional	Well-developed, well nourished	  · Neck	No bruits; no thyromegaly or nodules	  · Back	No deformity or limitation of movement	  · Respiratory	Breath Sounds equal & clear to percussion & auscultation, no accessory muscle use	  · Cardiovascular	Regular rate & rhythm, normal S1, S2; no murmurs, gallops or rubs; no S3, S4	  · Gastrointestinal	detailed exam	  · Gastrointestinal Comments	soft, slightly distended, +SM, sesitive to palpation in epigastric and periumbilical area	  · Genitourinary	Normal genitalia; no lesions; no discharge	  · Extremities	No cyanosis, clubbing or edema	  · Neurological	Alert & oriented; no sensory, motor or coordination deficits, normal reflexes	  · Skin	No lesions; no rash	        MEDICATIONS  (STANDING):  amLODIPine   Tablet 5 milliGRAM(s) Oral daily  dextrose 5%. 1000 milliLiter(s) (50 mL/Hr) IV Continuous <Continuous>  dextrose 50% Injectable 12.5 Gram(s) IV Push once  dextrose 50% Injectable 25 Gram(s) IV Push once  dextrose 50% Injectable 25 Gram(s) IV Push once  heparin   Injectable 5000 Unit(s) SubCutaneous every 8 hours  insulin glargine Injectable (LANTUS) 16 Unit(s) SubCutaneous at bedtime  insulin lispro (HumaLOG) corrective regimen sliding scale   SubCutaneous every 6 hours  lactated ringers. 1000 milliLiter(s) (125 mL/Hr) IV Continuous <Continuous>  pantoprazole  Injectable 40 milliGRAM(s) IV Push every 12 hours  sodium chloride 0.9%. 1000 milliLiter(s) (125 mL/Hr) IV Continuous <Continuous>    MEDICATIONS  (PRN):  acetaminophen   Tablet .. 650 milliGRAM(s) Oral every 6 hours PRN Mild Pain (1 - 3)  aluminum hydroxide/magnesium hydroxide/simethicone Suspension 30 milliLiter(s) Oral every 4 hours PRN Dyspepsia  dextrose 40% Gel 15 Gram(s) Oral once PRN Blood Glucose LESS THAN 70 milliGRAM(s)/deciliter  glucagon  Injectable 1 milliGRAM(s) IntraMuscular once PRN Glucose LESS THAN 70 milligrams/deciliter  morphine  - Injectable 2 milliGRAM(s) IV Push every 4 hours PRN Moderate Pain (4 - 6)  morphine  - Injectable 3 milliGRAM(s) IV Push every 4 hours PRN Severe Pain (7 - 10)  ondansetron Injectable 4 milliGRAM(s) IV Push every 6 hours PRN Nausea and/or Vomiting  senna 2 Tablet(s) Oral at bedtime PRN Constipation                              15.2   7.64  )-----------( 233      ( 14 Aug 2020 17:10 )             46.4     15 Aug 2020 08:48    138    |  107    |  9      ----------------------------<  103    4.5     |  27     |  1.00     Ca    8.8        15 Aug 2020 08:48    TPro  7.5    /  Alb  3.2    /  TBili  0.3    /  DBili  x      /  AST  49     /  ALT  48     /  AlkPhos  76     14 Aug 2020 17:10    LIVER FUNCTIONS - ( 14 Aug 2020 17:10 )  Alb: 3.2 g/dL / Pro: 7.5 gm/dL / ALK PHOS: 76 U/L / ALT: 48 U/L / AST: 49 U/L / GGT: x           PT/INR - ( 14 Aug 2020 17:10 )   PT: 11.9 sec;   INR: 1.03 ratio         PTT - ( 14 Aug 2020 17:10 )  PTT:29.9 sec  CAPILLARY BLOOD GLUCOSE      POCT Blood Glucose.: 90 mg/dL (15 Aug 2020 11:56)  POCT Blood Glucose.: 89 mg/dL (15 Aug 2020 03:33)  POCT Blood Glucose.: 84 mg/dL (14 Aug 2020 23:49)  POCT Blood Glucose.: 88 mg/dL (14 Aug 2020 23:48)        Urinalysis Basic - ( 14 Aug 2020 21:47 )    Color: Yellow / Appearance: Clear / S.015 / pH: x  Gluc: x / Ketone: Moderate  / Bili: Negative / Urobili: Negative mg/dL   Blood: x / Protein: Negative mg/dL / Nitrite: Negative   Leuk Esterase: Negative / RBC: 0-2 /HPF / WBC 3-5   Sq Epi: x / Non Sq Epi: Occasional / Bacteria: Occasional              Assessment and Plan:   Assessment:  · Assessment		  #Epigastric abdominal pain  Likely 2 to on going acute pancreatitis  R/o PUD  Mild elevation of lipase that resolved, but clinically still symptomatic  GI eval DR AI Bass pending   IV Protonix BID  Pain meds prn  NPO exc meds  IVF  May need EGD  Monitor electrolytes    #Diabetes  Reduce Lantus to 16 Units QHS while NPO (usual 30Units qHs)  ISS  Adjust based on FS    #HTN- cont home meds    #DVT proph- heparin SQ
69yo/M with PMH HTN, diabetes, prior h/o pancreatitis [resented with epigastric abd pain. Pain started about 4-5 days ago, was seen in ED and diagnosed with acute pancreatitis but did not want to stay. At home pain continues, radiates from epigastric area around to the back, worse after eating or drinking, better on the empty stomach. Patient states he could not eat anything past few days and came back to ED. Denies excessive alcohol intake. Prior h/o lap cholecystectomy.      08/15/20: Patient seen and examined. Still with upper abd pain. Discussed with patient in length regarding management and d/c plan.   20: Patient seen and examined. Abd pain improving, tolerating clears. Discussed with patient in length regarding management and d/c plan.       Vital Signs Last 24 Hrs  T(C): 36.3 (15 Aug 2020 09:01), Max: 37.1 (14 Aug 2020 16:20)  T(F): 97.4 (15 Aug 2020 09:01), Max: 98.8 (14 Aug 2020 16:20)  HR: 72 (15 Aug 2020 11:59) (61 - 73)  BP: 144/76 (15 Aug 2020 11:59) (141/82 - 166/84)  BP(mean): 128 (14 Aug 2020 21:49) (95 - 128)  RR: 16 (15 Aug 2020 09:01) (16 - 18)  SpO2: 99% (15 Aug 2020 09:01) (95% - 99%)        Physical Exam:  · Constitutional	Well-developed, well nourished	  · Neck	No bruits; no thyromegaly or nodules	  · Back	No deformity or limitation of movement	  · Respiratory	Breath Sounds equal & clear to percussion & auscultation, no accessory muscle use	  · Cardiovascular	Regular rate & rhythm, normal S1, S2; no murmurs, gallops or rubs; no S3, S4	  · Gastrointestinal	detailed exam	  · Gastrointestinal Comments	soft, slightly distended, +SM, sesitive to palpation in epigastric and periumbilical area	  · Genitourinary	Normal genitalia; no lesions; no discharge	  · Extremities	No cyanosis, clubbing or edema	  · Neurological	Alert & oriented; no sensory, motor or coordination deficits, normal reflexes	  · Skin	No lesions; no rash	        MEDICATIONS  (STANDING):  amLODIPine   Tablet 5 milliGRAM(s) Oral daily  dextrose 5%. 1000 milliLiter(s) (50 mL/Hr) IV Continuous <Continuous>  dextrose 50% Injectable 12.5 Gram(s) IV Push once  dextrose 50% Injectable 25 Gram(s) IV Push once  dextrose 50% Injectable 25 Gram(s) IV Push once  heparin   Injectable 5000 Unit(s) SubCutaneous every 8 hours  insulin glargine Injectable (LANTUS) 16 Unit(s) SubCutaneous at bedtime  insulin lispro (HumaLOG) corrective regimen sliding scale   SubCutaneous every 6 hours  lactated ringers. 1000 milliLiter(s) (125 mL/Hr) IV Continuous <Continuous>  pantoprazole  Injectable 40 milliGRAM(s) IV Push every 12 hours  sodium chloride 0.9%. 1000 milliLiter(s) (125 mL/Hr) IV Continuous <Continuous>    MEDICATIONS  (PRN):  acetaminophen   Tablet .. 650 milliGRAM(s) Oral every 6 hours PRN Mild Pain (1 - 3)  aluminum hydroxide/magnesium hydroxide/simethicone Suspension 30 milliLiter(s) Oral every 4 hours PRN Dyspepsia  dextrose 40% Gel 15 Gram(s) Oral once PRN Blood Glucose LESS THAN 70 milliGRAM(s)/deciliter  glucagon  Injectable 1 milliGRAM(s) IntraMuscular once PRN Glucose LESS THAN 70 milligrams/deciliter  morphine  - Injectable 2 milliGRAM(s) IV Push every 4 hours PRN Moderate Pain (4 - 6)  morphine  - Injectable 3 milliGRAM(s) IV Push every 4 hours PRN Severe Pain (7 - 10)  ondansetron Injectable 4 milliGRAM(s) IV Push every 6 hours PRN Nausea and/or Vomiting  senna 2 Tablet(s) Oral at bedtime PRN Constipation                               15.2   7.64  )-----------( 233      ( 14 Aug 2020 17:10 )             46.4     15 Aug 2020 08:48    138    |  107    |  9      ----------------------------<  103    4.5     |  27     |  1.00     Ca    8.8        15 Aug 2020 08:48    TPro  7.5    /  Alb  3.2    /  TBili  0.3    /  DBili  x      /  AST  49     /  ALT  48     /  AlkPhos  76     14 Aug 2020 17:10    LIVER FUNCTIONS - ( 14 Aug 2020 17:10 )  Alb: 3.2 g/dL / Pro: 7.5 gm/dL / ALK PHOS: 76 U/L / ALT: 48 U/L / AST: 49 U/L / GGT: x           PT/INR - ( 14 Aug 2020 17:10 )   PT: 11.9 sec;   INR: 1.03 ratio         PTT - ( 14 Aug 2020 17:10 )  PTT:29.9 sec  CAPILLARY BLOOD GLUCOSE      POCT Blood Glucose.: 218 mg/dL (16 Aug 2020 11:34)  POCT Blood Glucose.: 117 mg/dL (16 Aug 2020 05:13)  POCT Blood Glucose.: 103 mg/dL (15 Aug 2020 23:16)  POCT Blood Glucose.: 96 mg/dL (15 Aug 2020 16:36)        Urinalysis Basic - ( 14 Aug 2020 21:47 )    Color: Yellow / Appearance: Clear / S.015 / pH: x  Gluc: x / Ketone: Moderate  / Bili: Negative / Urobili: Negative mg/dL   Blood: x / Protein: Negative mg/dL / Nitrite: Negative   Leuk Esterase: Negative / RBC: 0-2 /HPF / WBC 3-5   Sq Epi: x / Non Sq Epi: Occasional / Bacteria: Occasional              Assessment and Plan:   Assessment:  · Assessment		  #Epigastric abdominal pain  Acute pancreatitis  Mild elevation of lipase that resolved, but clinically still symptomatic  Dr AI Bass consult appreciated    IV Protonix BID  Pain meds prn  On clears-advance as tolerated  IVF    #Diabetes  Continue lantus  ISS  Adjust based on FS    #HTN- cont home meds    #DVT proph- heparin SQ      Disposition: once tolerates regular diet
feels better  less abd pain  able to tolerate clears   no pain meds today    Allergies    No Known Allergies    Intolerances        MEDICATIONS  (STANDING):  amLODIPine   Tablet 5 milliGRAM(s) Oral daily  dextrose 5%. 1000 milliLiter(s) (50 mL/Hr) IV Continuous <Continuous>  dextrose 50% Injectable 12.5 Gram(s) IV Push once  dextrose 50% Injectable 25 Gram(s) IV Push once  dextrose 50% Injectable 25 Gram(s) IV Push once  heparin   Injectable 5000 Unit(s) SubCutaneous every 8 hours  insulin glargine Injectable (LANTUS) 16 Unit(s) SubCutaneous at bedtime  insulin lispro (HumaLOG) corrective regimen sliding scale   SubCutaneous every 6 hours  lactated ringers. 1000 milliLiter(s) (125 mL/Hr) IV Continuous <Continuous>  pancrelipase  (CREON 12,000 Lipase Units) 2 Capsule(s) Oral three times a day with meals  pantoprazole  Injectable 40 milliGRAM(s) IV Push every 12 hours  sodium chloride 0.9%. 1000 milliLiter(s) (125 mL/Hr) IV Continuous <Continuous>  sucralfate 1 Gram(s) Oral two times a day    MEDICATIONS  (PRN):  acetaminophen   Tablet .. 650 milliGRAM(s) Oral every 6 hours PRN Mild Pain (1 - 3)  aluminum hydroxide/magnesium hydroxide/simethicone Suspension 30 milliLiter(s) Oral every 4 hours PRN Dyspepsia  dextrose 40% Gel 15 Gram(s) Oral once PRN Blood Glucose LESS THAN 70 milliGRAM(s)/deciliter  glucagon  Injectable 1 milliGRAM(s) IntraMuscular once PRN Glucose LESS THAN 70 milligrams/deciliter  morphine  - Injectable 2 milliGRAM(s) IV Push every 4 hours PRN Moderate Pain (4 - 6)  morphine  - Injectable 3 milliGRAM(s) IV Push every 4 hours PRN Severe Pain (7 - 10)  ondansetron Injectable 4 milliGRAM(s) IV Push every 6 hours PRN Nausea and/or Vomiting  senna 2 Tablet(s) Oral at bedtime PRN Constipation      REVIEW OF SYSTEMS      General:	No fever or chills    Skin/Breast: No jaundice or rash   		  ENMT: denies sore throat or thrush    Respiratory and Thorax: Denies dyspnea or cough or shortness of breath  	  Cardiovascular: Denies chest pain or palpitations 	    Gastrointestinal: Denies jaundice or pruritis    Genitourinary: Denies dysuria or hematuria	    Musculoskeletal: Denies muscular pain or swelling	    Neurological: Denies confusion or tremor	    Hematology/Lymphatics: Denies easy bruising or bleeding 	    Endocrine:	Denies polyphagia or polyuria    See above hx otherwise neg for any major organ systems    PHYSICAL EXAM:    Vital Signs Last 24 Hrs  T(C): 36.4 (16 Aug 2020 08:14), Max: 36.7 (15 Aug 2020 17:47)  T(F): 97.5 (16 Aug 2020 08:14), Max: 98 (15 Aug 2020 17:47)  HR: 72 (16 Aug 2020 08:14) (70 - 72)  BP: 154/86 (16 Aug 2020 08:14) (154/86 - 166/83)  BP(mean): --  RR: 16 (16 Aug 2020 08:14) (16 - 18)  SpO2: 100% (16 Aug 2020 08:14) (98% - 100%)    Constitutional: no acute distress    ENMT: NC/AT scl anicteric opm pink no lesions     Neck: supple. No jvd or LN    Respiratory: Clear     Cardiovascular: RRR s1s2     Gastrointestinal: Pos bs , soft , not tender, no hepatosplenomegaly,  no mass        Back: No CVA tenderness    Extremities: NO cce     Neurological: Alert and oriented x 3     Skin: No rash or jaundice    Date/Time:08-15 @ 08:48    ALT/SGPT: --  Albumin: --  AST/SGOT: --  Bilirubin Direct: --  Bilirubin Total: --  Ca: 8.8  eGFR : 87  eGFR Non-: 75  Lipase: 207  Amylase: 56  INR: --  PTT: --        Date/Time:08-14 @ 17:10    ALT/SGPT: 48  Albumin: 3.2  AST/SGOT: 49  Bilirubin Direct: --  Bilirubin Total: 0.3  Ca: 8.9  eGFR : 84  eGFR Non-: 72  Lipase: 324  Amylase: --  INR: 1.03  PTT: --            08-15    138  |  107  |  9   ----------------------------<  103<H>  4.5   |  27  |  1.00    Ca    8.8      15 Aug 2020 08:48    TPro  7.5  /  Alb  3.2<L>  /  TBili  0.3  /  DBili  x   /  AST  49<H>  /  ALT  48  /  AlkPhos  76  08-14                            15.2   7.64  )-----------( 233      ( 14 Aug 2020 17:10 )             46.4     Lipase, Serum: 207 U/L (08.15.20 @ 08:48)  IgG Subsets (03.14.17 @ 07:07)    IgG 1: 538 mg/dL    IgG 2: 293 mg/dL    IgG 3: 31.3 mg/dL    IgG 4: 59.1: Test Performed by:  [address] [address] mg/dL    IgG Subset Total: 1040 mg/dL

## 2020-08-17 ENCOUNTER — TRANSCRIPTION ENCOUNTER (OUTPATIENT)
Age: 70
End: 2020-08-17

## 2020-08-17 VITALS
DIASTOLIC BLOOD PRESSURE: 75 MMHG | RESPIRATION RATE: 18 BRPM | TEMPERATURE: 98 F | OXYGEN SATURATION: 98 % | HEART RATE: 65 BPM | SYSTOLIC BLOOD PRESSURE: 145 MMHG

## 2020-08-17 PROCEDURE — 99239 HOSP IP/OBS DSCHRG MGMT >30: CPT

## 2020-08-17 RX ORDER — DAPAGLIFLOZIN 10 MG/1
1 TABLET, FILM COATED ORAL
Qty: 0 | Refills: 0 | DISCHARGE

## 2020-08-17 RX ORDER — LIPASE/PROTEASE/AMYLASE 16-48-48K
2 CAPSULE,DELAYED RELEASE (ENTERIC COATED) ORAL
Qty: 42 | Refills: 0
Start: 2020-08-17 | End: 2020-08-23

## 2020-08-17 RX ORDER — ENOXAPARIN SODIUM 100 MG/ML
30 INJECTION SUBCUTANEOUS
Qty: 0 | Refills: 0 | DISCHARGE

## 2020-08-17 RX ADMIN — Medication 1 GRAM(S): at 09:50

## 2020-08-17 RX ADMIN — Medication 2 CAPSULE(S): at 12:21

## 2020-08-17 RX ADMIN — AMLODIPINE BESYLATE 5 MILLIGRAM(S): 2.5 TABLET ORAL at 09:50

## 2020-08-17 RX ADMIN — POLYETHYLENE GLYCOL 3350 17 GRAM(S): 17 POWDER, FOR SOLUTION ORAL at 09:49

## 2020-08-17 RX ADMIN — PANTOPRAZOLE SODIUM 40 MILLIGRAM(S): 20 TABLET, DELAYED RELEASE ORAL at 09:49

## 2020-08-17 RX ADMIN — SODIUM CHLORIDE 125 MILLILITER(S): 9 INJECTION, SOLUTION INTRAVENOUS at 05:42

## 2020-08-17 RX ADMIN — SODIUM CHLORIDE 125 MILLILITER(S): 9 INJECTION, SOLUTION INTRAVENOUS at 10:49

## 2020-08-17 RX ADMIN — Medication 2 CAPSULE(S): at 08:40

## 2020-08-17 NOTE — DISCHARGE NOTE PROVIDER - CARE PROVIDER_API CALL
follow up with your gastroenterologist and pcp within 1 week.,   Phone: (   )    -  Fax: (   )    -  Follow Up Time: follow up with your gastroenterologist and pcp within 1 week.,   Phone: (   )    -  Fax: (   )    -  Follow Up Time:     Joe Mann  GASTROENTEROLOGY  [address] [place] [address]  Phone: [phone]  Fax: [phone]  Follow Up Time: 1 week

## 2020-08-17 NOTE — DISCHARGE NOTE NURSING/CASE MANAGEMENT/SOCIAL WORK - PATIENT PORTAL LINK FT
You can access the FollowMyHealth Patient Portal offered by [facility] by registering at the following website: [email or web address]. By joining [employer]’s FollowMyHealth portal, you will also be able to view your health information using other applications (apps) compatible with our system.

## 2020-08-17 NOTE — DISCHARGE NOTE PROVIDER - NSDCMRMEDTOKEN_GEN_ALL_CORE_FT
amLODIPine 5 mg oral tablet: 1 tab(s) orally once a day  HumaLOG KwikPen 100 units/mL subcutaneous solution: see sliding scale  provided unit(s) subcutaneous 4 times a day (after meals and at bedtime)  Lantus Solostar Pen 100 units/mL subcutaneous solution: 40 unit(s) subcutaneous once a day (at bedtime)  metFORMIN 1000 mg oral tablet: 1 tab(s) orally 2 times a day  ondansetron 4 mg oral tablet, disintegratin tab(s) orally 3 times a day   oxycodone-acetaminophen 5 mg-325 mg oral tablet: 1 tab(s) orally every 12 hours MDD:2  pancrelipase 12,000 units-38,000 units-60,000 units oral delayed release capsule: 2 cap(s) orally 3 times a day (with meals)  Pepcid 20 mg oral tablet: 1 tab(s) orally 2 times a day   Percocet 5/325 oral tablet: 1 tab(s) orally every 6 hours, As Needed -for severe pain MDD:4 tabs  Percocet 5/325 oral tablet: 1 tab(s) orally every 6 hours, As Needed -for severe pain MDD:4   Percocet 5/325 oral tablet: 1 tab(s) orally every 6 hours, As Needed -for severe pain MDD:4   Zantac: 150 milligram(s) orally once a day  Zofran 4 mg oral tablet: 1 tab(s) orally every 8 hours, As Needed -for nausea

## 2020-08-17 NOTE — PROVIDER CONTACT NOTE (OTHER) - SITUATION
notified Dr Welch's office of admission
Office aware of consult
Tele MD service spoke with Emigdio to advise MD of routine consult.
notified Dr Cline's service of consult

## 2020-08-17 NOTE — DISCHARGE NOTE PROVIDER - HOSPITAL COURSE
Reason for Admission/cc: epigastric abd pain        History of Present Illness:     69yo/M with PMH HTN, diabetes, prior h/o pancreatitis [resented with epigastric abd pain. Pain started about 4-5 days ago, was seen in ED and diagnosed with acute pancreatitis but did not want to stay. At home pain continues, radiates from epigastric area around to the back, worse after eating or drinking, better on the empty stomach. Patient states he could not eat anything past few days and came back to ED. Denies excessive alcohol intake. Prior h/o lap cholecystectomy.        Hospital Course:  Pt was treated with supportive care, IVFs.  Diet was slowly reintroduced with resolution of abd/epigastric pain.  Pt denies fever, chills, n, v.  Instructed him to avoid farxiga until outpatient follow up.  He should increase lantus 30u to 40u due to a1c 9.8.        REVIEW OF SYSTEMS: All other review of systems is negative unless indicated above.        Vital Signs Last 24 Hrs    T(C): 36.6 (17 Aug 2020 08:52), Max: 36.6 (17 Aug 2020 08:52)    T(F): 97.8 (17 Aug 2020 08:52), Max: 97.8 (17 Aug 2020 08:52)    HR: 65 (17 Aug 2020 08:52) (59 - 76)    BP: 145/75 (17 Aug 2020 08:52) (139/68 - 158/88)    BP(mean): --    RR: 18 (17 Aug 2020 08:52) (17 - 18)    SpO2: 98% (17 Aug 2020 08:52) (96% - 99%)        PHYSICAL EXAM:        Constitutional: NAD, awake and alert, well-developed    HEENT: PERR, EOMI, Normal Hearing, MMM    Neck: Soft and supple    Respiratory: Breath sounds are clear bilaterally, No wheezing, rales or rhonchi    Cardiovascular: S1 and S2, regular rate and rhythm, no Murmurs, gallops or rubs    Gastrointestinal: Bowel Sounds present, soft, nontender, nondistended, no guarding, no rebound    Extremities: No peripheral edema    Neurological: A/O x 3, no focal deficits in my limited exam        med/labs: Reviewed and interpreted         Assessment and Plan:         #Epigastric abdominal pain Likely 2 to acute pancreatitis: Idiopathic     Imaging studies reviewed    Mild elevation of lipase that resolved, but clinically still symptomatic    GI eval DR AI Bass appreciated, on IV protonix inpatient.  Will need outpatient follow up. take creon, oral PPI.     Pain meds prn    s/p IVFs    tolerating regular diet            #Diabetes    increase lantus on discharge 30 to 40u due to a1c 9.8    told to stop farxiga on discharge for now        #HTN- cont home meds            Attending Statement: 40 minutes spent on total encounter and discharge planning.

## 2020-08-17 NOTE — DISCHARGE NOTE PROVIDER - PROVIDER TOKENS
FREE:[LAST:[follow up with your gastroenterologist and pcp within 1 week.],PHONE:[(   )    -],FAX:[(   )    -]] FREE:[LAST:[follow up with your gastroenterologist and pcp within 1 week.],PHONE:[(   )    -],FAX:[(   )    -]],PROVIDER:[TOKEN:[[id number]],FOLLOWUP:[1 week]]

## 2020-08-17 NOTE — DISCHARGE NOTE PROVIDER - EXTENDED VTE YES NO FOR MLM ENOXAPARIN
Patient here for instill. Patient reports feeling need for instill today. Instill given per protocol. Patient catheterized for a residual of 60 ml. Chemstrip performed. Patient tolerated procedure well. ,

## 2020-08-17 NOTE — DISCHARGE NOTE PROVIDER - NSDCCPCAREPLAN_GEN_ALL_CORE_FT
PRINCIPAL DISCHARGE DIAGNOSIS  Diagnosis: Pancreatitis  Assessment and Plan of Treatment: resolved.  Stop farxiga until outpatient follow up.  Increase lantus to 40units due to a1c 9.8.  Follow up with gastroenterologist and pcp 1 week.  Continue home meds.

## 2020-08-19 NOTE — CDI QUERY NOTE - NSCDIOTHERTXTBX_GEN_ALL_CORE_HH
Documentation on chart of Acute Pancreatitis.    H&P   R/o PUD  Mild elevation of lipase that resolved, but clinically still symptomatic- ?PUD    GAstro: await igg subset results to look for igg4 level  check igg 4 r/o autoimmune pancreatitis    Discharge Summary:  #Epigastric abdominal pain Likely 2 to acute pancreatitis: Idiopathic   Imaging studies reviewed  Mild elevation of lipase that resolved, but clinically still symptomatic    Please clarify a diagnosis based on the above clinical criteria:  A) PUD ( Peptic Ulcer Disease) ruled in and present on admission.  B) PUD ( Peptic Ulcer Disease) ruled out  C) Unable to determine  D) Other ( Please specify condition) Documentation on chart of Acute Pancreatitis.    H&P   R/o PUD  Mild elevation of lipase that resolved, but clinically still symptomatic- ?PUD    GAstro: await igg subset results to look for igg4 level  check igg 4 r/o autoimmune pancreatitis    Discharge Summary:  #Epigastric abdominal pain Likely 2 to acute pancreatitis: Idiopathic   Imaging studies reviewed  Mild elevation of lipase that resolved, but clinically still symptomatic    Please clarify a diagnosis based on the above clinical criteria:  A) Acute PUD ( Acute Peptic Ulcer Disease) ruled in and present on admission.  B) PUD ( Peptic Ulcer Disease) ruled out  C) Unable to determine  D) Other ( Please specify condition)

## 2020-08-24 DIAGNOSIS — E11.9 TYPE 2 DIABETES MELLITUS WITHOUT COMPLICATIONS: ICD-10-CM

## 2020-08-24 DIAGNOSIS — K85.90 ACUTE PANCREATITIS WITHOUT NECROSIS OR INFECTION, UNSPECIFIED: ICD-10-CM

## 2020-08-24 DIAGNOSIS — Z79.84 LONG TERM (CURRENT) USE OF ORAL HYPOGLYCEMIC DRUGS: ICD-10-CM

## 2020-08-24 DIAGNOSIS — I10 ESSENTIAL (PRIMARY) HYPERTENSION: ICD-10-CM

## 2020-08-24 DIAGNOSIS — Z90.49 ACQUIRED ABSENCE OF OTHER SPECIFIED PARTS OF DIGESTIVE TRACT: ICD-10-CM

## 2020-10-23 ENCOUNTER — INPATIENT (INPATIENT)
Facility: HOSPITAL | Age: 70
LOS: 4 days | Discharge: HOME | DRG: 440 | End: 2020-10-28
Attending: INTERNAL MEDICINE | Admitting: FAMILY MEDICINE
Payer: COMMERCIAL

## 2020-10-23 VITALS — HEIGHT: 66 IN | WEIGHT: 175.05 LBS

## 2020-10-23 DIAGNOSIS — K85.90 ACUTE PANCREATITIS WITHOUT NECROSIS OR INFECTION, UNSPECIFIED: ICD-10-CM

## 2020-10-23 DIAGNOSIS — Z90.49 ACQUIRED ABSENCE OF OTHER SPECIFIED PARTS OF DIGESTIVE TRACT: Chronic | ICD-10-CM

## 2020-10-23 LAB
ALBUMIN SERPL ELPH-MCNC: 3.1 G/DL — LOW (ref 3.3–5)
ALP SERPL-CCNC: 87 U/L — SIGNIFICANT CHANGE UP (ref 40–120)
ALT FLD-CCNC: 25 U/L — SIGNIFICANT CHANGE UP (ref 12–78)
AMYLASE P1 CFR SERPL: 90 U/L — SIGNIFICANT CHANGE UP (ref 25–115)
ANION GAP SERPL CALC-SCNC: 6 MMOL/L — SIGNIFICANT CHANGE UP (ref 5–17)
APPEARANCE UR: CLEAR — SIGNIFICANT CHANGE UP
AST SERPL-CCNC: 20 U/L — SIGNIFICANT CHANGE UP (ref 15–37)
BASOPHILS # BLD AUTO: 0.03 K/UL — SIGNIFICANT CHANGE UP (ref 0–0.2)
BASOPHILS NFR BLD AUTO: 0.3 % — SIGNIFICANT CHANGE UP (ref 0–2)
BILIRUB SERPL-MCNC: 0.3 MG/DL — SIGNIFICANT CHANGE UP (ref 0.2–1.2)
BILIRUB UR-MCNC: NEGATIVE — SIGNIFICANT CHANGE UP
BUN SERPL-MCNC: 16 MG/DL — SIGNIFICANT CHANGE UP (ref 7–23)
CALCIUM SERPL-MCNC: 9.2 MG/DL — SIGNIFICANT CHANGE UP (ref 8.5–10.1)
CHLORIDE SERPL-SCNC: 105 MMOL/L — SIGNIFICANT CHANGE UP (ref 96–108)
CO2 SERPL-SCNC: 23 MMOL/L — SIGNIFICANT CHANGE UP (ref 22–31)
COLOR SPEC: YELLOW — SIGNIFICANT CHANGE UP
CREAT SERPL-MCNC: 1.1 MG/DL — SIGNIFICANT CHANGE UP (ref 0.5–1.3)
DIFF PNL FLD: ABNORMAL
EOSINOPHIL # BLD AUTO: 0.21 K/UL — SIGNIFICANT CHANGE UP (ref 0–0.5)
EOSINOPHIL NFR BLD AUTO: 2.4 % — SIGNIFICANT CHANGE UP (ref 0–6)
GLUCOSE SERPL-MCNC: 288 MG/DL — HIGH (ref 70–99)
GLUCOSE UR QL: 1000 MG/DL
HCT VFR BLD CALC: 42 % — SIGNIFICANT CHANGE UP (ref 39–50)
HGB BLD-MCNC: 13.8 G/DL — SIGNIFICANT CHANGE UP (ref 13–17)
IMM GRANULOCYTES NFR BLD AUTO: 0.2 % — SIGNIFICANT CHANGE UP (ref 0–1.5)
KETONES UR-MCNC: ABNORMAL
LEUKOCYTE ESTERASE UR-ACNC: NEGATIVE — SIGNIFICANT CHANGE UP
LIDOCAIN IGE QN: 874 U/L — HIGH (ref 73–393)
LYMPHOCYTES # BLD AUTO: 3.32 K/UL — HIGH (ref 1–3.3)
LYMPHOCYTES # BLD AUTO: 38 % — SIGNIFICANT CHANGE UP (ref 13–44)
MCHC RBC-ENTMCNC: 28.7 PG — SIGNIFICANT CHANGE UP (ref 27–34)
MCHC RBC-ENTMCNC: 32.9 GM/DL — SIGNIFICANT CHANGE UP (ref 32–36)
MCV RBC AUTO: 87.3 FL — SIGNIFICANT CHANGE UP (ref 80–100)
MONOCYTES # BLD AUTO: 0.89 K/UL — SIGNIFICANT CHANGE UP (ref 0–0.9)
MONOCYTES NFR BLD AUTO: 10.2 % — SIGNIFICANT CHANGE UP (ref 2–14)
NEUTROPHILS # BLD AUTO: 4.27 K/UL — SIGNIFICANT CHANGE UP (ref 1.8–7.4)
NEUTROPHILS NFR BLD AUTO: 48.9 % — SIGNIFICANT CHANGE UP (ref 43–77)
NITRITE UR-MCNC: NEGATIVE — SIGNIFICANT CHANGE UP
PH UR: 5 — SIGNIFICANT CHANGE UP (ref 5–8)
PLATELET # BLD AUTO: 235 K/UL — SIGNIFICANT CHANGE UP (ref 150–400)
POTASSIUM SERPL-MCNC: 4.6 MMOL/L — SIGNIFICANT CHANGE UP (ref 3.5–5.3)
POTASSIUM SERPL-SCNC: 4.6 MMOL/L — SIGNIFICANT CHANGE UP (ref 3.5–5.3)
PROT SERPL-MCNC: 7.8 GM/DL — SIGNIFICANT CHANGE UP (ref 6–8.3)
PROT UR-MCNC: 30 MG/DL
RBC # BLD: 4.81 M/UL — SIGNIFICANT CHANGE UP (ref 4.2–5.8)
RBC # FLD: 13 % — SIGNIFICANT CHANGE UP (ref 10.3–14.5)
SARS-COV-2 RNA SPEC QL NAA+PROBE: SIGNIFICANT CHANGE UP
SODIUM SERPL-SCNC: 134 MMOL/L — LOW (ref 135–145)
SP GR SPEC: 1.01 — SIGNIFICANT CHANGE UP (ref 1.01–1.02)
UROBILINOGEN FLD QL: NEGATIVE MG/DL — SIGNIFICANT CHANGE UP
WBC # BLD: 8.74 K/UL — SIGNIFICANT CHANGE UP (ref 3.8–10.5)
WBC # FLD AUTO: 8.74 K/UL — SIGNIFICANT CHANGE UP (ref 3.8–10.5)

## 2020-10-23 PROCEDURE — 82962 GLUCOSE BLOOD TEST: CPT

## 2020-10-23 PROCEDURE — 83036 HEMOGLOBIN GLYCOSYLATED A1C: CPT

## 2020-10-23 PROCEDURE — 71045 X-RAY EXAM CHEST 1 VIEW: CPT | Mod: 26

## 2020-10-23 PROCEDURE — 93010 ELECTROCARDIOGRAM REPORT: CPT

## 2020-10-23 PROCEDURE — 83690 ASSAY OF LIPASE: CPT

## 2020-10-23 PROCEDURE — 85027 COMPLETE CBC AUTOMATED: CPT

## 2020-10-23 PROCEDURE — 86769 SARS-COV-2 COVID-19 ANTIBODY: CPT

## 2020-10-23 PROCEDURE — 84478 ASSAY OF TRIGLYCERIDES: CPT

## 2020-10-23 PROCEDURE — 80053 COMPREHEN METABOLIC PANEL: CPT

## 2020-10-23 PROCEDURE — 99223 1ST HOSP IP/OBS HIGH 75: CPT | Mod: GC

## 2020-10-23 PROCEDURE — 84100 ASSAY OF PHOSPHORUS: CPT

## 2020-10-23 PROCEDURE — 36415 COLL VENOUS BLD VENIPUNCTURE: CPT

## 2020-10-23 PROCEDURE — 87635 SARS-COV-2 COVID-19 AMP PRB: CPT

## 2020-10-23 PROCEDURE — 85025 COMPLETE CBC W/AUTO DIFF WBC: CPT

## 2020-10-23 PROCEDURE — 83735 ASSAY OF MAGNESIUM: CPT

## 2020-10-23 PROCEDURE — 80061 LIPID PANEL: CPT

## 2020-10-23 PROCEDURE — 74177 CT ABD & PELVIS W/CONTRAST: CPT | Mod: 26

## 2020-10-23 RX ORDER — MORPHINE SULFATE 50 MG/1
4 CAPSULE, EXTENDED RELEASE ORAL ONCE
Refills: 0 | Status: DISCONTINUED | OUTPATIENT
Start: 2020-10-23 | End: 2020-10-23

## 2020-10-23 RX ORDER — SODIUM CHLORIDE 9 MG/ML
1000 INJECTION INTRAMUSCULAR; INTRAVENOUS; SUBCUTANEOUS ONCE
Refills: 0 | Status: COMPLETED | OUTPATIENT
Start: 2020-10-23 | End: 2020-10-23

## 2020-10-23 RX ORDER — KETOROLAC TROMETHAMINE 30 MG/ML
15 SYRINGE (ML) INJECTION EVERY 6 HOURS
Refills: 0 | Status: DISCONTINUED | OUTPATIENT
Start: 2020-10-23 | End: 2020-10-27

## 2020-10-23 RX ORDER — MORPHINE SULFATE 50 MG/1
2 CAPSULE, EXTENDED RELEASE ORAL EVERY 4 HOURS
Refills: 0 | Status: DISCONTINUED | OUTPATIENT
Start: 2020-10-23 | End: 2020-10-28

## 2020-10-23 RX ORDER — AMLODIPINE BESYLATE 2.5 MG/1
5 TABLET ORAL ONCE
Refills: 0 | Status: COMPLETED | OUTPATIENT
Start: 2020-10-23 | End: 2020-10-23

## 2020-10-23 RX ORDER — AMLODIPINE BESYLATE 2.5 MG/1
5 TABLET ORAL DAILY
Refills: 0 | Status: DISCONTINUED | OUTPATIENT
Start: 2020-10-23 | End: 2020-10-28

## 2020-10-23 RX ORDER — HYDROMORPHONE HYDROCHLORIDE 2 MG/ML
1 INJECTION INTRAMUSCULAR; INTRAVENOUS; SUBCUTANEOUS
Refills: 0 | Status: DISCONTINUED | OUTPATIENT
Start: 2020-10-23 | End: 2020-10-28

## 2020-10-23 RX ADMIN — AMLODIPINE BESYLATE 5 MILLIGRAM(S): 2.5 TABLET ORAL at 23:53

## 2020-10-23 RX ADMIN — SODIUM CHLORIDE 1000 MILLILITER(S): 9 INJECTION INTRAMUSCULAR; INTRAVENOUS; SUBCUTANEOUS at 22:03

## 2020-10-23 RX ADMIN — MORPHINE SULFATE 4 MILLIGRAM(S): 50 CAPSULE, EXTENDED RELEASE ORAL at 17:07

## 2020-10-23 RX ADMIN — MORPHINE SULFATE 4 MILLIGRAM(S): 50 CAPSULE, EXTENDED RELEASE ORAL at 18:54

## 2020-10-23 RX ADMIN — HYDROMORPHONE HYDROCHLORIDE 1 MILLIGRAM(S): 2 INJECTION INTRAMUSCULAR; INTRAVENOUS; SUBCUTANEOUS at 22:42

## 2020-10-23 RX ADMIN — SODIUM CHLORIDE 1000 MILLILITER(S): 9 INJECTION INTRAMUSCULAR; INTRAVENOUS; SUBCUTANEOUS at 17:07

## 2020-10-23 NOTE — ED STATDOCS - ATTENDING CONTRIBUTION TO CARE
I, Jaz Kyle MD,  performed the initial face to face bedside interview with this patient regarding history of present illness, review of symptoms and relevant past medical, social and family history.  I completed an independent physical examination.  I was the initial provider who evaluated this patient. I have signed out the follow up of any pending tests (i.e. labs, radiological studies) to the ACP.  I have communicated the patient’s plan of care and disposition with the ACP.  The history, relevant review of systems, past medical and surgical history, medical decision making, and physical examination was documented by the scribe in my presence and I attest to the accuracy of the documentation.

## 2020-10-23 NOTE — ED ADULT NURSE NOTE - OBJECTIVE STATEMENT
pt presents to ED c/o mid epigastric pain x 3days, worse w/ eating and drinking. pt denies n/v and fevers at home. pt AOx4, breathing symmetrical and unlabored, #20 IV inserted into L. AC, bloods drawn and sent to lab, pt in no acute distress at this time.

## 2020-10-23 NOTE — H&P ADULT - ASSESSMENT
71 yo male with PMHx of DM, HTN and prior history of pancreatitis.  Came in to the ED with severe abdominal pain 10/10, That started 3 days ago after eating fried food.  He describes the pain as sharp/pressure like that starts on the epigastric are and radiates to the right side of the abdomen.  He tried taking OTC medications, but the pain did not improve.  The pain gets worse whenever he eats or drinks and feels better when he's stomach is empty.    #Abdominal pain 2/2 acute pancreatits  - 4 mg Morphine x 2 given on the ED, continue pain management  - Admit to med-surg  - NPO  - Lipase 874  - CT: Evidence of acute pancreatitis predominantly involving the pancreatic head and neck.  - 2L NS given on the ED, Continue IV hydration 5 ml/kg/hr  - f/u AM CBC, CMP, lipid profile, electrolytes  - f/u GI consult    #Diabetes Mellitus  - Continue taking     #HTN  - Continue amlodipine 5 mg    #DVT ppx  - SVDs    #Advance directives  - Emergency contact son [phone]    Case, plan and management discussed with Dr. Cadet.` 69 yo male with PMHx of DM, HTN and prior history of pancreatitis.  Came in to the ED with severe abdominal pain 10/10, That started 3 days ago after eating fried food.  He describes the pain as sharp/pressure like that starts on the epigastric are and radiates to the right side of the abdomen.  He tried taking OTC medications, but the pain did not improve.  The pain gets worse whenever he eats or drinks and feels better when he's stomach is empty.    #Abdominal pain 2/2 acute pancreatits  - 4 mg Morphine x 2 given on the ED, continue pain management  - Admit to med-surg  - NPO  - Lipase 874  - CT: Evidence of acute pancreatitis predominantly involving the pancreatic head and neck.  - 2L NS given on the ED, Continue IV hydration 5 ml/kg/hr  - f/u AM CBC, CMP, lipid profile, electrolytes  - f/u GI consult    #Diabetes Mellitus  - Continue Humalog  4 units before every meal and at bedtime  - Continue Lantus 30 units at bedtime     #HTN  - Continue amlodipine 5 mg    #DVT ppx  - SVDs    #Advance directives  - Emergency contact Thom Graves [phone]. Works in the lab at [employer]     Case, plan and management discussed with Dr. Cadet.` 69 yo male with PMHx of DM, HTN and prior history of pancreatitis.  Came in to the ED with severe abdominal pain 10/10, That started 3 days ago after eating fried food.  He describes the pain as sharp/pressure like that starts on the epigastric are and radiates to the right side of the abdomen.  He tried taking OTC medications, but the pain did not improve.  The pain gets worse whenever he eats or drinks and feels better when he's stomach is empty.    #Abdominal pain 2/2 acute pancreatits  - 4 mg Morphine x 2 given on the ED, continue pain management  - Admit to med-surg  - NPO  - Lipase 874  - CT: Evidence of acute pancreatitis predominantly involving the pancreatic head and neck.  - 2L NS given on the ED, Continue IV hydration 125cc/hr  - f/u AM CBC, CMP, lipid profile, electrolytes  - f/u GI consult    #Diabetes Mellitus  - Continue Humalog  4 units before every meal and at bedtime  - Patient takes 30 units Lantus at bedtime on a regular basis, Patient is NPO and 15 units were administered on admission day.  Consider increase to 30 units once the diet status is advanced.    #HTN  - Continue amlodipine 5 mg    #DVT ppx  - SVDs    #Advance directives  - Emergency contact Thom Graves [phone]. Works in the lab at [employer]     Case, plan and management discussed with Dr. Cadet.`

## 2020-10-23 NOTE — H&P ADULT - NSHPREVIEWOFSYSTEMS_GEN_ALL_CORE
REVIEW OF SYSTEMS:    CONSTITUTIONAL: No weakness, fevers or chills  EYES/ENT: No visual changes;  No vertigo or throat pain   NECK: No pain or stiffness  RESPIRATORY: No cough, wheezing, hemoptysis; No shortness of breath  CARDIOVASCULAR: No chest pain or palpitations  GASTROINTESTINAL: + epigastric pain radiating to the Right abdomen. No nausea, vomiting, or hematemesis; No diarrhea or constipation. No melena or hematochezia.  GENITOURINARY: No dysuria, frequency or hematuria  NEUROLOGICAL: No numbness or weakness  SKIN: No itching, rashes

## 2020-10-23 NOTE — ED STATDOCS - PROGRESS NOTE DETAILS
69 yo male with a PMH of htn, dm, pancreatitis presents with epigastric pain x 3 days, radiating to the R abdomen. States he had similar pain when he was dx with pancreatitis in august 2020. Denies f/c/sweating, n/v/d/cp/sob. Labs, CT, UA, pain meds, reeval. -Navarro Smith PA-C Pt with CT showing acute pancreatitis and elevated lipase >800. Will admit. Call made to hospitlaist and left message to call back. -Navarro Smith PA-C

## 2020-10-23 NOTE — ED ADULT TRIAGE NOTE - CHIEF COMPLAINT QUOTE
Patient presents to ED complaining of epigastric pain x several days. Pt reports no appetite since onset of pain.  Denies chest pain, SOB, fevers.

## 2020-10-23 NOTE — H&P ADULT - NSHPPHYSICALEXAM_GEN_ALL_CORE
Vital Signs Last 24 Hrs  T(C): 36.3 (23 Oct 2020 20:33), Max: 37 (23 Oct 2020 16:04)  T(F): 97.4 (23 Oct 2020 20:33), Max: 98.6 (23 Oct 2020 16:04)  HR: 70 (23 Oct 2020 20:33) (70 - 82)  BP: 171/90 (23 Oct 2020 20:33) (171/81 - 171/90)  BP(mean): 113 (23 Oct 2020 20:33) (102 - 113)  RR: 18 (23 Oct 2020 20:33) (17 - 18)  SpO2: 98% (23 Oct 2020 20:33) (97% - 98%)

## 2020-10-23 NOTE — ED STATDOCS - GASTROINTESTINAL, MLM
abdomen soft, and non-distended. Bowel sounds present. +epigastric and RUQ TTP with voluntary guarding

## 2020-10-23 NOTE — ED STATDOCS - OBJECTIVE STATEMENT
69 y/o M with PMHx of HTN, DM, pancreatitis, cholecystectomy presenting to the ED c/o epigastric abd pain x3 days. Pain worsened with eating and drinking, non radiating. Patient was seen in [facility] in August 2020 with similar complaints, diagnosed with pancreatitis and admitted to the hospital. No relief with oxycodone. Patient has also been having decreased PO intake/appetite since onset of pain. Denies any CP, SOB, dizziness, N/V/D, fever or chills.  GI: Dr. Mann

## 2020-10-23 NOTE — H&P ADULT - NSICDXFAMILYHX_GEN_ALL_CORE_FT
FAMILY HISTORY:  Family history of cancer in mother  Family history of diabetes mellitus in brother

## 2020-10-23 NOTE — H&P ADULT - HISTORY OF PRESENT ILLNESS
69 yo male with PMHx of DM, HTN and prior history of pancreatitis.  Came in to the ED with severe abdominal pain 10/10, That started 3 days ago after eating fried food.  He describes the pain as sharp/pressure like that starts on the epigastric are and radiates to the right side of the abdomen.  He tried taking OTC medications, but the pain did not improve.  The pain gets worse whenever he eats or drinks and feels better when he's stomach is empty.  He was here on August with similar pain and it was also associated with food.  Patient denies any headache, SOB, palpitations, N/V, diarrhea, constipation.  Patient denies tobacco or EtOH intake. 69 yo male with PMHx of DM, HTN and prior history of pancreatitis.  Came in to the ED with severe abdominal pain 10/10, That started 3 days ago after eating fried food.  He describes the pain as sharp/pressure like that starts on the epigastric are and radiates to the right side of the abdomen and when it gets worse it goes all the way to the back.  He tried taking OTC medications, but the pain did not improve.  The pain gets worse whenever he eats or drinks and feels better when he's stomach is empty.  He was here on August with similar pain and it was also associated with food.  Patient denies any headache, SOB, palpitations, N/V, diarrhea, constipation.  Patient denies tobacco or EtOH intake.

## 2020-10-23 NOTE — ED STATDOCS - CPE ED CARDIAC NORM
----- Message from Angeles Brooklyn sent at 7/13/2020  2:23 PM CDT -----  Type: Patient Call Back    Who called:Krystal Rutherford    What is the request in detail: spouse asking for a call back to ask for advice for a referral to neurology    Can the clinic reply by [facility]? No     Would the patient rather a call back or a response via [facility]? Call back     Best call back number: [phone]    Additional Information:        
Call - ok to take 2 pills at one time and we wrote the script to say that so tell wife perfectly ok and see if it controls pain a little better    For the neuro appt, looks like seen Dr Erickson in Neuro and they can call and set another appt or ask her who else she would rec BUt I bet she can handle it  
Spoke to Mrs. Rutherford about the questions she asked. She verbally understood the information given to her.  
Spoke with pt wife she stated pt has been in a lot of pain lately pain ranging at a 10 and bp been run high for a week 160 and 170 she stated that the pain is chronic and she asked if  It was ok to give him 2 Narco 5mg at once pt stated he is in a lot of pain and just need a Dr smith help him I asked Dr.Nguyen cruz he advise yes and if the pt is in too much of pain he can go to the er she stated  he will also like to get referred to neurolgy   
normal...

## 2020-10-23 NOTE — H&P ADULT - ATTENDING COMMENTS
71 y/o M PMHx as noted above admitted for further evaluation and management of acute abdominal pain consistent with acute pancreatitis.    #Abdominal pain due to Acute Pancreatitis  ~admit to Medicine  ~cont. pain management  ~NPO and advance diet accordingly  ~as noted above CT notable for acute pancreatitis predominantly involving the pancreatic head and neck.  ~cont. IV hydration  ~f/u am labs  ~strict I/Os  ~f/u GI consultation    #Diabetes Mellitus (insulin-dependent)  ~cont. Basal-Bolus Insuli regimen  ~FS q6h while NPO    #HTN  ~cont. Amlodipine 5mg po daily    ~Vte ppx  ~cont. SCDs for now

## 2020-10-24 LAB
A1C WITH ESTIMATED AVERAGE GLUCOSE RESULT: 11 % — HIGH (ref 4–5.6)
ALBUMIN SERPL ELPH-MCNC: 2.5 G/DL — LOW (ref 3.3–5)
ALP SERPL-CCNC: 74 U/L — SIGNIFICANT CHANGE UP (ref 40–120)
ALT FLD-CCNC: 20 U/L — SIGNIFICANT CHANGE UP (ref 12–78)
ANION GAP SERPL CALC-SCNC: 5 MMOL/L — SIGNIFICANT CHANGE UP (ref 5–17)
AST SERPL-CCNC: 11 U/L — LOW (ref 15–37)
BASOPHILS # BLD AUTO: 0.05 K/UL — SIGNIFICANT CHANGE UP (ref 0–0.2)
BASOPHILS NFR BLD AUTO: 0.7 % — SIGNIFICANT CHANGE UP (ref 0–2)
BILIRUB SERPL-MCNC: 0.4 MG/DL — SIGNIFICANT CHANGE UP (ref 0.2–1.2)
BUN SERPL-MCNC: 10 MG/DL — SIGNIFICANT CHANGE UP (ref 7–23)
CALCIUM SERPL-MCNC: 8.7 MG/DL — SIGNIFICANT CHANGE UP (ref 8.5–10.1)
CHLORIDE SERPL-SCNC: 107 MMOL/L — SIGNIFICANT CHANGE UP (ref 96–108)
CHOLEST SERPL-MCNC: 121 MG/DL — SIGNIFICANT CHANGE UP
CO2 SERPL-SCNC: 29 MMOL/L — SIGNIFICANT CHANGE UP (ref 22–31)
CREAT SERPL-MCNC: 0.97 MG/DL — SIGNIFICANT CHANGE UP (ref 0.5–1.3)
EOSINOPHIL # BLD AUTO: 0.25 K/UL — SIGNIFICANT CHANGE UP (ref 0–0.5)
EOSINOPHIL NFR BLD AUTO: 3.7 % — SIGNIFICANT CHANGE UP (ref 0–6)
ESTIMATED AVERAGE GLUCOSE: 269 MG/DL — HIGH (ref 68–114)
GLUCOSE SERPL-MCNC: 103 MG/DL — HIGH (ref 70–99)
HCT VFR BLD CALC: 39.4 % — SIGNIFICANT CHANGE UP (ref 39–50)
HDLC SERPL-MCNC: 43 MG/DL — SIGNIFICANT CHANGE UP
HGB BLD-MCNC: 12.6 G/DL — LOW (ref 13–17)
IMM GRANULOCYTES NFR BLD AUTO: 0.3 % — SIGNIFICANT CHANGE UP (ref 0–1.5)
LIPID PNL WITH DIRECT LDL SERPL: 61 MG/DL — SIGNIFICANT CHANGE UP
LYMPHOCYTES # BLD AUTO: 3.07 K/UL — SIGNIFICANT CHANGE UP (ref 1–3.3)
LYMPHOCYTES # BLD AUTO: 45 % — HIGH (ref 13–44)
MAGNESIUM SERPL-MCNC: 1.8 MG/DL — SIGNIFICANT CHANGE UP (ref 1.6–2.6)
MCHC RBC-ENTMCNC: 28.6 PG — SIGNIFICANT CHANGE UP (ref 27–34)
MCHC RBC-ENTMCNC: 32 GM/DL — SIGNIFICANT CHANGE UP (ref 32–36)
MCV RBC AUTO: 89.3 FL — SIGNIFICANT CHANGE UP (ref 80–100)
MONOCYTES # BLD AUTO: 0.81 K/UL — SIGNIFICANT CHANGE UP (ref 0–0.9)
MONOCYTES NFR BLD AUTO: 11.9 % — SIGNIFICANT CHANGE UP (ref 2–14)
NEUTROPHILS # BLD AUTO: 2.62 K/UL — SIGNIFICANT CHANGE UP (ref 1.8–7.4)
NEUTROPHILS NFR BLD AUTO: 38.4 % — LOW (ref 43–77)
NON HDL CHOLESTEROL: 78 MG/DL — SIGNIFICANT CHANGE UP
PHOSPHATE SERPL-MCNC: 2.9 MG/DL — SIGNIFICANT CHANGE UP (ref 2.5–4.5)
PLATELET # BLD AUTO: 231 K/UL — SIGNIFICANT CHANGE UP (ref 150–400)
POTASSIUM SERPL-MCNC: 4.2 MMOL/L — SIGNIFICANT CHANGE UP (ref 3.5–5.3)
POTASSIUM SERPL-SCNC: 4.2 MMOL/L — SIGNIFICANT CHANGE UP (ref 3.5–5.3)
PROT SERPL-MCNC: 6.7 GM/DL — SIGNIFICANT CHANGE UP (ref 6–8.3)
RBC # BLD: 4.41 M/UL — SIGNIFICANT CHANGE UP (ref 4.2–5.8)
RBC # FLD: 13.2 % — SIGNIFICANT CHANGE UP (ref 10.3–14.5)
SARS-COV-2 IGG SERPL QL IA: POSITIVE
SARS-COV-2 IGM SERPL IA-ACNC: 5.38 INDEX — HIGH
SODIUM SERPL-SCNC: 141 MMOL/L — SIGNIFICANT CHANGE UP (ref 135–145)
TRIGL SERPL-MCNC: 82 MG/DL — SIGNIFICANT CHANGE UP
WBC # BLD: 6.82 K/UL — SIGNIFICANT CHANGE UP (ref 3.8–10.5)
WBC # FLD AUTO: 6.82 K/UL — SIGNIFICANT CHANGE UP (ref 3.8–10.5)

## 2020-10-24 PROCEDURE — 99233 SBSQ HOSP IP/OBS HIGH 50: CPT | Mod: GC

## 2020-10-24 RX ORDER — SODIUM CHLORIDE 9 MG/ML
1000 INJECTION, SOLUTION INTRAVENOUS
Refills: 0 | Status: DISCONTINUED | OUTPATIENT
Start: 2020-10-24 | End: 2020-10-28

## 2020-10-24 RX ORDER — INSULIN GLARGINE 100 [IU]/ML
10 INJECTION, SOLUTION SUBCUTANEOUS AT BEDTIME
Refills: 0 | Status: DISCONTINUED | OUTPATIENT
Start: 2020-10-24 | End: 2020-10-28

## 2020-10-24 RX ORDER — SODIUM CHLORIDE 9 MG/ML
1000 INJECTION INTRAMUSCULAR; INTRAVENOUS; SUBCUTANEOUS
Refills: 0 | Status: DISCONTINUED | OUTPATIENT
Start: 2020-10-24 | End: 2020-10-27

## 2020-10-24 RX ORDER — DEXTROSE 50 % IN WATER 50 %
25 SYRINGE (ML) INTRAVENOUS ONCE
Refills: 0 | Status: DISCONTINUED | OUTPATIENT
Start: 2020-10-24 | End: 2020-10-28

## 2020-10-24 RX ORDER — DEXTROSE 50 % IN WATER 50 %
12.5 SYRINGE (ML) INTRAVENOUS ONCE
Refills: 0 | Status: DISCONTINUED | OUTPATIENT
Start: 2020-10-24 | End: 2020-10-28

## 2020-10-24 RX ORDER — INSULIN LISPRO 100/ML
VIAL (ML) SUBCUTANEOUS AT BEDTIME
Refills: 0 | Status: DISCONTINUED | OUTPATIENT
Start: 2020-10-24 | End: 2020-10-28

## 2020-10-24 RX ORDER — INSULIN LISPRO 100/ML
VIAL (ML) SUBCUTANEOUS
Refills: 0 | Status: DISCONTINUED | OUTPATIENT
Start: 2020-10-24 | End: 2020-10-28

## 2020-10-24 RX ORDER — ENOXAPARIN SODIUM 100 MG/ML
40 INJECTION SUBCUTANEOUS
Qty: 0 | Refills: 0 | DISCHARGE

## 2020-10-24 RX ORDER — PANTOPRAZOLE SODIUM 20 MG/1
40 TABLET, DELAYED RELEASE ORAL
Refills: 0 | Status: DISCONTINUED | OUTPATIENT
Start: 2020-10-24 | End: 2020-10-28

## 2020-10-24 RX ORDER — DEXTROSE 50 % IN WATER 50 %
15 SYRINGE (ML) INTRAVENOUS ONCE
Refills: 0 | Status: DISCONTINUED | OUTPATIENT
Start: 2020-10-24 | End: 2020-10-28

## 2020-10-24 RX ORDER — GLUCAGON INJECTION, SOLUTION 0.5 MG/.1ML
1 INJECTION, SOLUTION SUBCUTANEOUS ONCE
Refills: 0 | Status: DISCONTINUED | OUTPATIENT
Start: 2020-10-24 | End: 2020-10-28

## 2020-10-24 RX ORDER — INSULIN GLARGINE 100 [IU]/ML
15 INJECTION, SOLUTION SUBCUTANEOUS AT BEDTIME
Refills: 0 | Status: DISCONTINUED | OUTPATIENT
Start: 2020-10-24 | End: 2020-10-24

## 2020-10-24 RX ORDER — SODIUM CHLORIDE 9 MG/ML
1000 INJECTION INTRAMUSCULAR; INTRAVENOUS; SUBCUTANEOUS
Refills: 0 | Status: COMPLETED | OUTPATIENT
Start: 2020-10-24 | End: 2020-10-24

## 2020-10-24 RX ORDER — SODIUM CHLORIDE 9 MG/ML
1000 INJECTION INTRAMUSCULAR; INTRAVENOUS; SUBCUTANEOUS
Refills: 0 | Status: DISCONTINUED | OUTPATIENT
Start: 2020-10-24 | End: 2020-10-24

## 2020-10-24 RX ORDER — RANITIDINE HYDROCHLORIDE 150 MG/1
150 TABLET, FILM COATED ORAL
Qty: 0 | Refills: 0 | DISCHARGE

## 2020-10-24 RX ADMIN — HYDROMORPHONE HYDROCHLORIDE 1 MILLIGRAM(S): 2 INJECTION INTRAMUSCULAR; INTRAVENOUS; SUBCUTANEOUS at 10:00

## 2020-10-24 RX ADMIN — HYDROMORPHONE HYDROCHLORIDE 1 MILLIGRAM(S): 2 INJECTION INTRAMUSCULAR; INTRAVENOUS; SUBCUTANEOUS at 15:45

## 2020-10-24 RX ADMIN — Medication 15 MILLIGRAM(S): at 02:23

## 2020-10-24 RX ADMIN — HYDROMORPHONE HYDROCHLORIDE 1 MILLIGRAM(S): 2 INJECTION INTRAMUSCULAR; INTRAVENOUS; SUBCUTANEOUS at 21:14

## 2020-10-24 RX ADMIN — HYDROMORPHONE HYDROCHLORIDE 1 MILLIGRAM(S): 2 INJECTION INTRAMUSCULAR; INTRAVENOUS; SUBCUTANEOUS at 09:43

## 2020-10-24 RX ADMIN — SODIUM CHLORIDE 125 MILLILITER(S): 9 INJECTION INTRAMUSCULAR; INTRAVENOUS; SUBCUTANEOUS at 01:19

## 2020-10-24 RX ADMIN — Medication 15 MILLIGRAM(S): at 22:29

## 2020-10-24 RX ADMIN — Medication 15 MILLIGRAM(S): at 02:52

## 2020-10-24 RX ADMIN — SODIUM CHLORIDE 150 MILLILITER(S): 9 INJECTION INTRAMUSCULAR; INTRAVENOUS; SUBCUTANEOUS at 20:10

## 2020-10-24 RX ADMIN — MORPHINE SULFATE 2 MILLIGRAM(S): 50 CAPSULE, EXTENDED RELEASE ORAL at 01:19

## 2020-10-24 RX ADMIN — HYDROMORPHONE HYDROCHLORIDE 1 MILLIGRAM(S): 2 INJECTION INTRAMUSCULAR; INTRAVENOUS; SUBCUTANEOUS at 20:44

## 2020-10-24 RX ADMIN — MORPHINE SULFATE 2 MILLIGRAM(S): 50 CAPSULE, EXTENDED RELEASE ORAL at 02:24

## 2020-10-24 RX ADMIN — AMLODIPINE BESYLATE 5 MILLIGRAM(S): 2.5 TABLET ORAL at 15:28

## 2020-10-24 RX ADMIN — SODIUM CHLORIDE 75 MILLILITER(S): 9 INJECTION INTRAMUSCULAR; INTRAVENOUS; SUBCUTANEOUS at 11:23

## 2020-10-24 RX ADMIN — SODIUM CHLORIDE 150 MILLILITER(S): 9 INJECTION INTRAMUSCULAR; INTRAVENOUS; SUBCUTANEOUS at 14:31

## 2020-10-24 RX ADMIN — HYDROMORPHONE HYDROCHLORIDE 1 MILLIGRAM(S): 2 INJECTION INTRAMUSCULAR; INTRAVENOUS; SUBCUTANEOUS at 15:28

## 2020-10-24 NOTE — DIETITIAN INITIAL EVALUATION ADULT. - PERTINENT LABORATORY DATA
10-24    141  |  107  |  10  ----------------------------<  103<H>  4.2   |  29  |  0.97    Ca    8.7      24 Oct 2020 07:21  Phos  2.9     10-24  Mg     1.8     10-24    TPro  6.7  /  Alb  2.5<L>  /  TBili  0.4  /  DBili  x   /  AST  11<L>  /  ALT  20  /  AlkPhos  74  10-24  A1C 9.8

## 2020-10-24 NOTE — DIETITIAN INITIAL EVALUATION ADULT. - PERTINENT MEDS FT
MEDICATIONS  (STANDING):  amLODIPine   Tablet 5 milliGRAM(s) Oral daily  dextrose 5%. 1000 milliLiter(s) (50 mL/Hr) IV Continuous <Continuous>  dextrose 50% Injectable 12.5 Gram(s) IV Push once  dextrose 50% Injectable 25 Gram(s) IV Push once  dextrose 50% Injectable 25 Gram(s) IV Push once  insulin glargine Injectable (LANTUS) 15 Unit(s) SubCutaneous at bedtime  insulin lispro (ADMELOG) corrective regimen sliding scale   SubCutaneous three times a day before meals  insulin lispro (ADMELOG) corrective regimen sliding scale   SubCutaneous at bedtime  pantoprazole    Tablet 40 milliGRAM(s) Oral before breakfast  sodium chloride 0.9%. 1000 milliLiter(s) (75 mL/Hr) IV Continuous <Continuous>    MEDICATIONS  (PRN):  dextrose 40% Gel 15 Gram(s) Oral once PRN Blood Glucose LESS THAN 70 milliGRAM(s)/deciliter  glucagon  Injectable 1 milliGRAM(s) IntraMuscular once PRN Glucose LESS THAN 70 milligrams/deciliter  HYDROmorphone  Injectable 1 milliGRAM(s) IV Push every 3 hours PRN Severe Pain (7 - 10)  ketorolac   Injectable 15 milliGRAM(s) IV Push every 6 hours PRN Mild Pain (1 - 3)  morphine  - Injectable 2 milliGRAM(s) IV Push every 4 hours PRN Moderate Pain (4 - 6)

## 2020-10-24 NOTE — DIETITIAN INITIAL EVALUATION ADULT. - OTHER INFO
71yo male with PMH of DM, HTN and prior pancreatitis p/w severe abd pain 10/10.  Pt admitted with acute pancreatitis.    *d/w pt low fat diet and provided detailed verbal education.  pt verbalized understanding.  however, given A1C, pt will require DM diet review/education prior to d/c after PO diet is resumed.  re-consult for DM education prior to d/c.

## 2020-10-24 NOTE — CONSULT NOTE ADULT - ASSESSMENT
70M with diabetes and recurrent mild pancreatitis of unclear etiology.  No EtOH use, s/p cholecystectomy, normal LFTs and recently had normal triglycerides.     Recommend:  -increase IVF to 150 cc/h since NPO  -trial clears tomorrow  -check triglycerides in AM  -consider outpatient MRI pancreas or EUS when resolved to r/o pancreas pathology  -follow up with Dr. Mann after discharge

## 2020-10-24 NOTE — DIETITIAN INITIAL EVALUATION ADULT. - NAME AND PHONE
Ju Diallo MA, RDN, CDN, [facility]  [phone] (office number)  [phone] (pager number)

## 2020-10-24 NOTE — CONSULT NOTE ADULT - SUBJECTIVE AND OBJECTIVE BOX
GI consult    HPI:  69 yo male with PMHx of DM, HTN and prior history of pancreatitis.  Came in to the ED with severe abdominal pain 10/10, That started 3 days ago after eating fried food.  He describes the pain as sharp/pressure like that starts on the epigastric are and radiates to the right side of the abdomen and when it gets worse it goes all the way to the back.  He tried taking OTC medications, but the pain did not improve.  The pain gets worse whenever he eats or drinks and feels better when he's stomach is empty.  He was here on August with similar pain and it was also associated with food.      Pt of Dr. Mann. He reports ongoing pain today but better than yesterday. Does not want to try PO yet. No new meds reported. Denies EtOH. +nausea, no vomiting. No fever. No BM x 4 days.      PAST MEDICAL & SURGICAL HISTORY:  DM (diabetes mellitus)    HTN (hypertension)    Pancreatitis    S/P laparoscopic cholecystectomy        Home Medications:  HumaLOG KwikPen 100 units/mL subcutaneous solution: 4 unit(s) subcutaneous 4 times a day (after meals and at bedtime) (24 Oct 2020 00:08)  Lantus Solostar Pen 100 units/mL subcutaneous solution: 30 unit(s) subcutaneous once a day (at bedtime) (24 Oct 2020 00:08)  metFORMIN 1000 mg oral tablet: 1 tab(s) orally 2 times a day (24 Oct 2020 00:08)  pantoprazole 40 mg oral delayed release tablet: 1 tab(s) orally once a day (24 Oct 2020 00:08)      MEDICATIONS  (STANDING):  amLODIPine   Tablet 5 milliGRAM(s) Oral daily  dextrose 5%. 1000 milliLiter(s) (50 mL/Hr) IV Continuous <Continuous>  dextrose 50% Injectable 12.5 Gram(s) IV Push once  dextrose 50% Injectable 25 Gram(s) IV Push once  dextrose 50% Injectable 25 Gram(s) IV Push once  insulin glargine Injectable (LANTUS) 15 Unit(s) SubCutaneous at bedtime  insulin lispro (ADMELOG) corrective regimen sliding scale   SubCutaneous three times a day before meals  insulin lispro (ADMELOG) corrective regimen sliding scale   SubCutaneous at bedtime  pantoprazole    Tablet 40 milliGRAM(s) Oral before breakfast  sodium chloride 0.9%. 1000 milliLiter(s) (75 mL/Hr) IV Continuous <Continuous>    MEDICATIONS  (PRN):  dextrose 40% Gel 15 Gram(s) Oral once PRN Blood Glucose LESS THAN 70 milliGRAM(s)/deciliter  glucagon  Injectable 1 milliGRAM(s) IntraMuscular once PRN Glucose LESS THAN 70 milligrams/deciliter  HYDROmorphone  Injectable 1 milliGRAM(s) IV Push every 3 hours PRN Severe Pain (7 - 10)  ketorolac   Injectable 15 milliGRAM(s) IV Push every 6 hours PRN Mild Pain (1 - 3)  morphine  - Injectable 2 milliGRAM(s) IV Push every 4 hours PRN Moderate Pain (4 - 6)      Allergies    No Known Allergies    Intolerances        SOCIAL HISTORY: no tob or EtOH    FAMILY HISTORY:  Family history of cancer in mother    Family history of diabetes mellitus in brother        ROS  As above  Otherwise unremarkable, all systems reviewed    PE:  Vital Signs Last 24 Hrs  T(C): 36.3 (24 Oct 2020 07:40), Max: 37 (23 Oct 2020 16:04)  T(F): 97.3 (24 Oct 2020 07:40), Max: 98.6 (23 Oct 2020 16:04)  HR: 67 (24 Oct 2020 07:40) (63 - 82)  BP: 144/74 (24 Oct 2020 07:40) (144/74 - 177/85)  BP(mean): 113 (23 Oct 2020 20:33) (102 - 113)  RR: 16 (24 Oct 2020 07:40) (16 - 18)  SpO2: 95% (24 Oct 2020 07:40) (95% - 100%)    Constitutional: NAD, well-developed, A+Ox3  Anicteric   Respiratory: CTABL, breathing comfortably  Cardiovascular: S1 and S2, RRR  Gastrointestinal: +BS, soft, mild mid upper tenderness, non distended, no mass  Extremities: warm, well perfused, no edema  Psychiatric: Normal mood, normal affect  Neuro: moves all extremities, grossly intact  Skin: No rashes or lesions    LABS:                        12.6   6.82  )-----------( 231      ( 24 Oct 2020 07:21 )             39.4     10-24    141  |  107  |  10  ----------------------------<  103<H>  4.2   |  29  |  0.97    Ca    8.7      24 Oct 2020 07:21  Phos  2.9     10-24  Mg     1.8     10-24    TPro  6.7  /  Alb  2.5<L>  /  TBili  0.4  /  DBili  x   /  AST  11<L>  /  ALT  20  /  AlkPhos  74  10-24      LIVER FUNCTIONS - ( 24 Oct 2020 07:21 )  Alb: 2.5 g/dL / Pro: 6.7 gm/dL / ALK PHOS: 74 U/L / ALT: 20 U/L / AST: 11 U/L / GGT: x             RADIOLOGY & ADDITIONAL STUDIES:    CT shows mild interstitial pancreatitis

## 2020-10-24 NOTE — DIETITIAN INITIAL EVALUATION ADULT. - ADD RECOMMEND
1) as feasible, advance diet as tolerated to DASH, consistent carb 2) prior to d/c, re-consult for DM diet education 3) monitor length NPO, advancement/tolerance nutr 4) weekly wt checks to track/trend changes

## 2020-10-24 NOTE — PROGRESS NOTE ADULT - SUBJECTIVE AND OBJECTIVE BOX
HPI: 69 yo male with PMHx of DM, HTN and prior history of pancreatitis.  Came in to the ED with severe abdominal pain 10/10, That started 3 days ago after eating fried food.  He describes the pain as sharp/pressure like that starts on the epigastric are and radiates to the right side of the abdomen and when it gets worse it goes all the way to the back.  He tried taking OTC medications, but the pain did not improve.  The pain gets worse whenever he eats or drinks and feels better when he's stomach is empty.  He was here on August with similar pain and it was also associated with food.  Patient denies any headache, SOB, palpitations, N/V, diarrhea, constipation.  Patient denies tobacco or EtOH intake.     Subjective: Pt seen at bedside. AOx3. NAD. Says abdominal pain improved but still has pain 6/10. Denies N/V, diarrhea, chest pain, constipation.    REVIEW OF SYSTEMS:  CONSTITUTIONAL: No weakness, fevers or chills  EYES/ENT: No visual changes;  No vertigo or throat pain   NECK: No pain or stiffness  RESPIRATORY: No cough, wheezing, hemoptysis; No shortness of breath  CARDIOVASCULAR: No chest pain or palpitations  GASTROINTESTINAL: +epigastric pain No nausea, vomiting, or hematemesis; No diarrhea or constipation. No melena or hematochezia.  GENITOURINARY: No dysuria, frequency or hematuria  NEUROLOGICAL: No numbness or weakness  SKIN: No itching, burning, rashes, or lesions   All other review of systems is negative unless indicated above    Vital Signs Last 24 Hrs  T(C): 36.3 (24 Oct 2020 07:40), Max: 37 (23 Oct 2020 16:04)  T(F): 97.3 (24 Oct 2020 07:40), Max: 98.6 (23 Oct 2020 16:04)  HR: 67 (24 Oct 2020 07:40) (63 - 82)  BP: 144/74 (24 Oct 2020 07:40) (144/74 - 177/85)  BP(mean): 113 (23 Oct 2020 20:33) (102 - 113)  RR: 16 (24 Oct 2020 07:40) (16 - 18)  SpO2: 95% (24 Oct 2020 07:40) (95% - 100%)    Constitutional: Pt lying in bed, awake and alert, NAD  HEENT: EOMI, normal hearing, moist mucous membranes  Neck: Soft and supple, no JVD  Respiratory: CTABL, No wheezing, rales or rhonchi  Cardiovascular: S1S2+, RRR, no M/G/R  Gastrointestinal: BS+, soft, TTP in epigastric region, ND, no guarding, no rebound  Extremities: No peripheral edema  Vascular: 2+ peripheral pulses  Neurological: AAOx3, no focal deficits  Musculoskeletal: 5/5 strength b/l upper and lower extremities  Skin: No rashes    MEDICATIONS:  MEDICATIONS  (STANDING):  amLODIPine   Tablet 5 milliGRAM(s) Oral daily  dextrose 5%. 1000 milliLiter(s) (50 mL/Hr) IV Continuous <Continuous>  dextrose 50% Injectable 12.5 Gram(s) IV Push once  dextrose 50% Injectable 25 Gram(s) IV Push once  dextrose 50% Injectable 25 Gram(s) IV Push once  insulin glargine Injectable (LANTUS) 15 Unit(s) SubCutaneous at bedtime  insulin lispro (ADMELOG) corrective regimen sliding scale   SubCutaneous three times a day before meals  insulin lispro (ADMELOG) corrective regimen sliding scale   SubCutaneous at bedtime  pantoprazole    Tablet 40 milliGRAM(s) Oral before breakfast  sodium chloride 0.9%. 1000 milliLiter(s) (75 mL/Hr) IV Continuous <Continuous>      LABS: All Labs Reviewed:                        12.6   6.82  )-----------( 231      ( 24 Oct 2020 07:21 )             39.4     10-24    141  |  107  |  10  ----------------------------<  103<H>  4.2   |  29  |  0.97    Ca    8.7      24 Oct 2020 07:21  Phos  2.9     10-24  Mg     1.8     10-24    TPro  6.7  /  Alb  2.5<L>  /  TBili  0.4  /  DBili  x   /  AST  11<L>  /  ALT  20  /  AlkPhos  74  10-24      CAPILLARY BLOOD GLUCOSE  POCT Blood Glucose.: 102 mg/dL (24 Oct 2020 11:56)  POCT Blood Glucose.: 96 mg/dL (24 Oct 2020 07:23)  POCT Blood Glucose.: 125 mg/dL (24 Oct 2020 00:42)      RADIOLOGY/EKG:  < from: Xray Chest 1 View AP/PA. (10.23.20 @ 16:45) >    INTERPRETATION:  Frontal chest on October 23, 2020 at 4:41 PM. 2 views. Patient has right upper quadrant pain.    Heart is normal for projection.    On August 14of this year there were basilar atelectatic changes. Present film shows significant improvement with slight residual.    IMPRESSION: As above.    < end of copied text >  < from: CT Abdomen and Pelvis w/ IV Cont (10.23.20 @ 19:01) >    IMPRESSION:  Evidence of acute pancreatitis predominantly involving the pancreatic head and neck.    < end of copied text >

## 2020-10-25 LAB
ALBUMIN SERPL ELPH-MCNC: 2.7 G/DL — LOW (ref 3.3–5)
ALP SERPL-CCNC: 76 U/L — SIGNIFICANT CHANGE UP (ref 40–120)
ALT FLD-CCNC: 20 U/L — SIGNIFICANT CHANGE UP (ref 12–78)
ANION GAP SERPL CALC-SCNC: 6 MMOL/L — SIGNIFICANT CHANGE UP (ref 5–17)
AST SERPL-CCNC: 17 U/L — SIGNIFICANT CHANGE UP (ref 15–37)
BASOPHILS # BLD AUTO: 0.04 K/UL — SIGNIFICANT CHANGE UP (ref 0–0.2)
BASOPHILS NFR BLD AUTO: 0.8 % — SIGNIFICANT CHANGE UP (ref 0–2)
BILIRUB SERPL-MCNC: 0.4 MG/DL — SIGNIFICANT CHANGE UP (ref 0.2–1.2)
BUN SERPL-MCNC: 9 MG/DL — SIGNIFICANT CHANGE UP (ref 7–23)
CALCIUM SERPL-MCNC: 8.7 MG/DL — SIGNIFICANT CHANGE UP (ref 8.5–10.1)
CHLORIDE SERPL-SCNC: 108 MMOL/L — SIGNIFICANT CHANGE UP (ref 96–108)
CO2 SERPL-SCNC: 27 MMOL/L — SIGNIFICANT CHANGE UP (ref 22–31)
CREAT SERPL-MCNC: 0.96 MG/DL — SIGNIFICANT CHANGE UP (ref 0.5–1.3)
EOSINOPHIL # BLD AUTO: 0.27 K/UL — SIGNIFICANT CHANGE UP (ref 0–0.5)
EOSINOPHIL NFR BLD AUTO: 5.1 % — SIGNIFICANT CHANGE UP (ref 0–6)
GLUCOSE SERPL-MCNC: 124 MG/DL — HIGH (ref 70–99)
HCT VFR BLD CALC: 40 % — SIGNIFICANT CHANGE UP (ref 39–50)
HGB BLD-MCNC: 13.1 G/DL — SIGNIFICANT CHANGE UP (ref 13–17)
IMM GRANULOCYTES NFR BLD AUTO: 0.2 % — SIGNIFICANT CHANGE UP (ref 0–1.5)
LIDOCAIN IGE QN: 174 U/L — SIGNIFICANT CHANGE UP (ref 73–393)
LYMPHOCYTES # BLD AUTO: 2.8 K/UL — SIGNIFICANT CHANGE UP (ref 1–3.3)
LYMPHOCYTES # BLD AUTO: 52.8 % — HIGH (ref 13–44)
MCHC RBC-ENTMCNC: 28.9 PG — SIGNIFICANT CHANGE UP (ref 27–34)
MCHC RBC-ENTMCNC: 32.8 GM/DL — SIGNIFICANT CHANGE UP (ref 32–36)
MCV RBC AUTO: 88.1 FL — SIGNIFICANT CHANGE UP (ref 80–100)
MONOCYTES # BLD AUTO: 0.58 K/UL — SIGNIFICANT CHANGE UP (ref 0–0.9)
MONOCYTES NFR BLD AUTO: 10.9 % — SIGNIFICANT CHANGE UP (ref 2–14)
NEUTROPHILS # BLD AUTO: 1.6 K/UL — LOW (ref 1.8–7.4)
NEUTROPHILS NFR BLD AUTO: 30.2 % — LOW (ref 43–77)
PLATELET # BLD AUTO: 241 K/UL — SIGNIFICANT CHANGE UP (ref 150–400)
POTASSIUM SERPL-MCNC: 4.3 MMOL/L — SIGNIFICANT CHANGE UP (ref 3.5–5.3)
POTASSIUM SERPL-SCNC: 4.3 MMOL/L — SIGNIFICANT CHANGE UP (ref 3.5–5.3)
PROT SERPL-MCNC: 6.8 GM/DL — SIGNIFICANT CHANGE UP (ref 6–8.3)
RBC # BLD: 4.54 M/UL — SIGNIFICANT CHANGE UP (ref 4.2–5.8)
RBC # FLD: 13.1 % — SIGNIFICANT CHANGE UP (ref 10.3–14.5)
SODIUM SERPL-SCNC: 141 MMOL/L — SIGNIFICANT CHANGE UP (ref 135–145)
TRIGL SERPL-MCNC: 79 MG/DL — SIGNIFICANT CHANGE UP
WBC # BLD: 5.3 K/UL — SIGNIFICANT CHANGE UP (ref 3.8–10.5)
WBC # FLD AUTO: 5.3 K/UL — SIGNIFICANT CHANGE UP (ref 3.8–10.5)

## 2020-10-25 PROCEDURE — 99232 SBSQ HOSP IP/OBS MODERATE 35: CPT | Mod: GC

## 2020-10-25 RX ORDER — INSULIN GLARGINE 100 [IU]/ML
5 INJECTION, SOLUTION SUBCUTANEOUS ONCE
Refills: 0 | Status: COMPLETED | OUTPATIENT
Start: 2020-10-25 | End: 2020-10-25

## 2020-10-25 RX ADMIN — SODIUM CHLORIDE 150 MILLILITER(S): 9 INJECTION INTRAMUSCULAR; INTRAVENOUS; SUBCUTANEOUS at 02:39

## 2020-10-25 RX ADMIN — HYDROMORPHONE HYDROCHLORIDE 1 MILLIGRAM(S): 2 INJECTION INTRAMUSCULAR; INTRAVENOUS; SUBCUTANEOUS at 15:15

## 2020-10-25 RX ADMIN — Medication 15 MILLIGRAM(S): at 20:04

## 2020-10-25 RX ADMIN — SODIUM CHLORIDE 150 MILLILITER(S): 9 INJECTION INTRAMUSCULAR; INTRAVENOUS; SUBCUTANEOUS at 20:04

## 2020-10-25 RX ADMIN — MORPHINE SULFATE 2 MILLIGRAM(S): 50 CAPSULE, EXTENDED RELEASE ORAL at 14:49

## 2020-10-25 RX ADMIN — INSULIN GLARGINE 5 UNIT(S): 100 INJECTION, SOLUTION SUBCUTANEOUS at 22:42

## 2020-10-25 RX ADMIN — Medication 15 MILLIGRAM(S): at 20:46

## 2020-10-25 RX ADMIN — Medication 1: at 11:21

## 2020-10-25 RX ADMIN — Medication 15 MILLIGRAM(S): at 00:03

## 2020-10-25 RX ADMIN — PANTOPRAZOLE SODIUM 40 MILLIGRAM(S): 20 TABLET, DELAYED RELEASE ORAL at 05:38

## 2020-10-25 RX ADMIN — AMLODIPINE BESYLATE 5 MILLIGRAM(S): 2.5 TABLET ORAL at 09:49

## 2020-10-25 RX ADMIN — MORPHINE SULFATE 2 MILLIGRAM(S): 50 CAPSULE, EXTENDED RELEASE ORAL at 11:26

## 2020-10-25 NOTE — PROGRESS NOTE ADULT - SUBJECTIVE AND OBJECTIVE BOX
CHIEF COMPLAINT:    SUBJECTIVE:   HPI:  69 yo male with PMHx of DM, HTN and prior history of pancreatitis.  Came in to the ED with severe abdominal pain 10/10, That started 3 days prior to admission after eating fried food.  He describes the pain as sharp/pressure like that starts on the epigastric are and radiates to the right side of the abdomen and when it gets worse it goes all the way to the back.  He tried taking OTC medications, but the pain did not improve.  The pain gets worse whenever he eats or drinks and feels better when he's stomach is empty.  He was here in August with similar pain and it was also associated with food.  Patient denies any headache, SOB, palpitations, N/V, diarrhea, constipation.  Patient denies tobacco or EtOH intake.      10/25 no complaints overnight. Pt seen at bedside. Tolerating liquids. Advance diet as tolerated    REVIEW OF SYSTEMS:  CONSTITUTIONAL: No weakness, fevers or chills  EYES/ENT: No visual changes;  No vertigo or throat pain   NECK: No pain or stiffness  RESPIRATORY: No cough, wheezing, hemoptysis; No shortness of breath  CARDIOVASCULAR: No chest pain or palpitations  GASTROINTESTINAL: + abdominal and epigastric pain improving. No nausea, vomiting, or hematemesis; No diarrhea or constipation. No melena or hematochezia.  GENITOURINARY: No dysuria, frequency or hematuria  NEUROLOGICAL: No numbness or weakness  SKIN: No itching, burning, rashes, or lesions   All other review of systems is negative unless indicated above    Vital Signs Last 24 Hrs  T(C): 36.9 (25 Oct 2020 07:57), Max: 36.9 (24 Oct 2020 20:41)  T(F): 98.4 (25 Oct 2020 07:57), Max: 98.5 (24 Oct 2020 23:19)  HR: 66 (25 Oct 2020 07:57) (66 - 78)  BP: 138/73 (25 Oct 2020 07:57) (138/73 - 168/84)  BP(mean): --  RR: 17 (25 Oct 2020 07:57) (16 - 17)  SpO2: 95% (25 Oct 2020 07:57) (95% - 97%)    I&O's Summary      CAPILLARY BLOOD GLUCOSE      POCT Blood Glucose.: 193 mg/dL (25 Oct 2020 10:33)  POCT Blood Glucose.: 129 mg/dL (25 Oct 2020 07:37)  POCT Blood Glucose.: 127 mg/dL (24 Oct 2020 22:19)  POCT Blood Glucose.: 147 mg/dL (24 Oct 2020 20:55)  POCT Blood Glucose.: 89 mg/dL (24 Oct 2020 17:09)      PHYSICAL EXAM:  Constitutional: NAD, awake and alert, well-developed  HEENT: PERR, EOMI, Normal Hearing, MMM  Neck: Soft and supple, No LAD  Respiratory: Breath sounds are clear bilaterally, No wheezing, rales or rhonchi  Cardiovascular: S1 and S2, regular rate and rhythm, no Murmurs, gallops or rubs  Gastrointestinal: Bowel Sounds present, soft, nontender, nondistended, no guarding, no rebound  Extremities: No peripheral edema  Vascular: 2+ peripheral pulses  Neurological: A/O x 3, no focal deficits  Musculoskeletal: 5/5 strength b/l upper and lower extremities  Skin: No rashes    MEDICATIONS:  MEDICATIONS  (STANDING):  amLODIPine   Tablet 5 milliGRAM(s) Oral daily  dextrose 5%. 1000 milliLiter(s) (50 mL/Hr) IV Continuous <Continuous>  dextrose 50% Injectable 12.5 Gram(s) IV Push once  dextrose 50% Injectable 25 Gram(s) IV Push once  dextrose 50% Injectable 25 Gram(s) IV Push once  insulin glargine Injectable (LANTUS) 10 Unit(s) SubCutaneous at bedtime  insulin lispro (ADMELOG) corrective regimen sliding scale   SubCutaneous three times a day before meals  insulin lispro (ADMELOG) corrective regimen sliding scale   SubCutaneous at bedtime  pantoprazole    Tablet 40 milliGRAM(s) Oral before breakfast  sodium chloride 0.9%. 1000 milliLiter(s) (150 mL/Hr) IV Continuous <Continuous>      LABS: All Labs Reviewed:                        13.1   5.30  )-----------( 241      ( 25 Oct 2020 08:02 )             40.0     10-25    141  |  108  |  9   ----------------------------<  124<H>  4.3   |  27  |  0.96    Ca    8.7      25 Oct 2020 08:02  Phos  2.9     10-24  Mg     1.8     10-24    TPro  6.8  /  Alb  2.7<L>  /  TBili  0.4  /  DBili  x   /  AST  17  /  ALT  20  /  AlkPhos  76  10-25          Blood Culture:     RADIOLOGY/EKG:  < from: CT Abdomen and Pelvis w/ IV Cont (10.23.20 @ 19:01) >  IMPRESSION:  Evidence of acute pancreatitis predominantly involving the pancreatic head and neck.    < end of copied text >    DVT PPX:    ADVANCED DIRECTIVE:    DISPOSITION:

## 2020-10-25 NOTE — PROGRESS NOTE ADULT - SUBJECTIVE AND OBJECTIVE BOX
Patient is a 70y old  Male who presents with a chief complaint of Abdominal pain (25 Oct 2020 14:16)      HPI:  71 yo male with PMHx of DM, HTN and prior history of pancreatitis.  Came in to the ED with severe abdominal pain 10/10, That started 3 days ago after eating fried food.  He describes the pain as sharp/pressure like that starts on the epigastric are and radiates to the right side of the abdomen and when it gets worse it goes all the way to the back.  He tried taking OTC medications, but the pain did not improve.  The pain gets worse whenever he eats or drinks and feels better when he's stomach is empty.  He was here on August with similar pain and it was also associated with food.  Patient denies any headache, SOB, palpitations, N/V, diarrhea, constipation.  Patient denies tobacco or EtOH intake.  (23 Oct 2020 21:05)    pt comf andhad mild pain after clears  neg N V  neg cp or sob  PAST MEDICAL & SURGICAL HISTORY:  DM (diabetes mellitus)    HTN (hypertension)    Pancreatitis    S/P laparoscopic cholecystectomy        MEDICATIONS  (STANDING):  amLODIPine   Tablet 5 milliGRAM(s) Oral daily  dextrose 5%. 1000 milliLiter(s) (50 mL/Hr) IV Continuous <Continuous>  dextrose 50% Injectable 12.5 Gram(s) IV Push once  dextrose 50% Injectable 25 Gram(s) IV Push once  dextrose 50% Injectable 25 Gram(s) IV Push once  insulin glargine Injectable (LANTUS) 10 Unit(s) SubCutaneous at bedtime  insulin lispro (ADMELOG) corrective regimen sliding scale   SubCutaneous three times a day before meals  insulin lispro (ADMELOG) corrective regimen sliding scale   SubCutaneous at bedtime  pantoprazole    Tablet 40 milliGRAM(s) Oral before breakfast  sodium chloride 0.9%. 1000 milliLiter(s) (150 mL/Hr) IV Continuous <Continuous>    MEDICATIONS  (PRN):  dextrose 40% Gel 15 Gram(s) Oral once PRN Blood Glucose LESS THAN 70 milliGRAM(s)/deciliter  glucagon  Injectable 1 milliGRAM(s) IntraMuscular once PRN Glucose LESS THAN 70 milligrams/deciliter  HYDROmorphone  Injectable 1 milliGRAM(s) IV Push every 3 hours PRN Severe Pain (7 - 10)  ketorolac   Injectable 15 milliGRAM(s) IV Push every 6 hours PRN Mild Pain (1 - 3)  morphine  - Injectable 2 milliGRAM(s) IV Push every 4 hours PRN Moderate Pain (4 - 6)      Allergies    No Known Allergies    Intolerances        SOCIAL HISTORY:    FAMILY HISTORY:  Family history of cancer in mother    Family history of diabetes mellitus in brother        REVIEW OF SYSTEMS:    CONSTITUTIONAL: No weakness, fevers or chills  EYES/ENT: No visual changes;  No vertigo or throat pain   NECK: No pain or stiffness  RESPIRATORY: No cough, wheezing, hemoptysis; No shortness of breath  CARDIOVASCULAR: No chest pain or palpitations  GENITOURINARY: No dysuria, frequency or hematuria  NEUROLOGICAL: No numbness or weakness  SKIN: No itching, burning, rashes, or lesions   All other review of systems is negative unless indicated above.    Vital Signs Last 24 Hrs  T(C): 36.9 (25 Oct 2020 07:57), Max: 36.9 (24 Oct 2020 20:41)  T(F): 98.4 (25 Oct 2020 07:57), Max: 98.5 (24 Oct 2020 23:19)  HR: 66 (25 Oct 2020 07:57) (66 - 78)  BP: 138/73 (25 Oct 2020 07:57) (138/73 - 168/84)  BP(mean): --  RR: 17 (25 Oct 2020 07:57) (16 - 17)  SpO2: 95% (25 Oct 2020 07:57) (95% - 97%)    PHYSICAL EXAM:    Constitutional: NAD, well-developed  HEENT: EOMI, throat clear  Neck: No LAD, supple  Respiratory: CTA and P  Cardiovascular: S1 and S2, RRR, no M  Gastrointestinal: BS+, soft, NT/ND, neg HSM,  Extremities: No peripheral edema, neg clubing, cyanosis  Vascular: 2+ peripheral pulses  Neurological: A/O x 3, no focal deficits  Psychiatric: Normal mood, normal affect  Skin: No rashes    LABS:  CBC Full  -  ( 25 Oct 2020 08:02 )  WBC Count : 5.30 K/uL  RBC Count : 4.54 M/uL  Hemoglobin : 13.1 g/dL  Hematocrit : 40.0 %  Platelet Count - Automated : 241 K/uL  Mean Cell Volume : 88.1 fl  Mean Cell Hemoglobin : 28.9 pg  Mean Cell Hemoglobin Concentration : 32.8 gm/dL  Auto Neutrophil # : 1.60 K/uL  Auto Lymphocyte # : 2.80 K/uL  Auto Monocyte # : 0.58 K/uL  Auto Eosinophil # : 0.27 K/uL  Auto Basophil # : 0.04 K/uL  Auto Neutrophil % : 30.2 %  Auto Lymphocyte % : 52.8 %  Auto Monocyte % : 10.9 %  Auto Eosinophil % : 5.1 %  Auto Basophil % : 0.8 %    10-25    141  |  108  |  9   ----------------------------<  124<H>  4.3   |  27  |  0.96    Ca    8.7      25 Oct 2020 08:02  Phos  2.9     10-24  Mg     1.8     10-24    TPro  6.8  /  Alb  2.7<L>  /  TBili  0.4  /  DBili  x   /  AST  17  /  ALT  20  /  AlkPhos  76  10-25            RADIOLOGY & ADDITIONAL STUDIES:  CT shows mild interstitial pancreatitis

## 2020-10-25 NOTE — PROGRESS NOTE ADULT - ASSESSMENT
71 y/o M PMHx as noted above admitted for further evaluation and management of acute abdominal pain consistent with acute pancreatitis.    #Acute pancreatitis  - 4 mg Morphine x 2 given on the ED, continue pain management  -  advance diet as tolerated  - Lipase 874 -> 174  - CT: Evidence of acute pancreatitis predominantly involving the pancreatic head and neck.  - 2L NS given on the ED, Continue IV hydration   - f/u GI consult    #Diabetes Mellitus  - Patient takes 30 units Lantus at bedtime on a regular basis, due to decrease PO intake.  Consider increase to 30 units once the diet status is advanced.  -lantus 10 units qhs   -ISS  -accuchecks      #HTN  - Continue amlodipine 5 mg    #DVT ppx  - SCDs    #Advance directives  - Emergency contact Thom Graves [phone]. Works in the lab at [facility] [employer]

## 2020-10-25 NOTE — PROGRESS NOTE ADULT - ASSESSMENT
70M with diabetes and recurrent mild pancreatitis of unclear etiology.  No EtOH use, s/p cholecystectomy, normal LFTs and recently had normal triglycerides.         cont clears and clinical FU  -check triglycerides in AM  -consider outpatient MRI pancreas or EUS when resolved to r/o pancreas pathology  -follow up with Dr. Mann after discharge

## 2020-10-26 LAB
ALBUMIN SERPL ELPH-MCNC: 2.6 G/DL — LOW (ref 3.3–5)
ALP SERPL-CCNC: 75 U/L — SIGNIFICANT CHANGE UP (ref 40–120)
ALT FLD-CCNC: 23 U/L — SIGNIFICANT CHANGE UP (ref 12–78)
ANION GAP SERPL CALC-SCNC: 7 MMOL/L — SIGNIFICANT CHANGE UP (ref 5–17)
AST SERPL-CCNC: 16 U/L — SIGNIFICANT CHANGE UP (ref 15–37)
BILIRUB SERPL-MCNC: 0.4 MG/DL — SIGNIFICANT CHANGE UP (ref 0.2–1.2)
BUN SERPL-MCNC: 7 MG/DL — SIGNIFICANT CHANGE UP (ref 7–23)
CALCIUM SERPL-MCNC: 9 MG/DL — SIGNIFICANT CHANGE UP (ref 8.5–10.1)
CHLORIDE SERPL-SCNC: 110 MMOL/L — HIGH (ref 96–108)
CO2 SERPL-SCNC: 25 MMOL/L — SIGNIFICANT CHANGE UP (ref 22–31)
CREAT SERPL-MCNC: 0.98 MG/DL — SIGNIFICANT CHANGE UP (ref 0.5–1.3)
GLUCOSE SERPL-MCNC: 126 MG/DL — HIGH (ref 70–99)
HCT VFR BLD CALC: 39.5 % — SIGNIFICANT CHANGE UP (ref 39–50)
HGB BLD-MCNC: 13 G/DL — SIGNIFICANT CHANGE UP (ref 13–17)
MCHC RBC-ENTMCNC: 29 PG — SIGNIFICANT CHANGE UP (ref 27–34)
MCHC RBC-ENTMCNC: 32.9 GM/DL — SIGNIFICANT CHANGE UP (ref 32–36)
MCV RBC AUTO: 88 FL — SIGNIFICANT CHANGE UP (ref 80–100)
PLATELET # BLD AUTO: 242 K/UL — SIGNIFICANT CHANGE UP (ref 150–400)
POTASSIUM SERPL-MCNC: 4.2 MMOL/L — SIGNIFICANT CHANGE UP (ref 3.5–5.3)
POTASSIUM SERPL-SCNC: 4.2 MMOL/L — SIGNIFICANT CHANGE UP (ref 3.5–5.3)
PROT SERPL-MCNC: 6.6 GM/DL — SIGNIFICANT CHANGE UP (ref 6–8.3)
RBC # BLD: 4.49 M/UL — SIGNIFICANT CHANGE UP (ref 4.2–5.8)
RBC # FLD: 13.1 % — SIGNIFICANT CHANGE UP (ref 10.3–14.5)
SODIUM SERPL-SCNC: 142 MMOL/L — SIGNIFICANT CHANGE UP (ref 135–145)
WBC # BLD: 5.56 K/UL — SIGNIFICANT CHANGE UP (ref 3.8–10.5)
WBC # FLD AUTO: 5.56 K/UL — SIGNIFICANT CHANGE UP (ref 3.8–10.5)

## 2020-10-26 PROCEDURE — 99232 SBSQ HOSP IP/OBS MODERATE 35: CPT | Mod: GC

## 2020-10-26 RX ADMIN — SODIUM CHLORIDE 150 MILLILITER(S): 9 INJECTION INTRAMUSCULAR; INTRAVENOUS; SUBCUTANEOUS at 20:13

## 2020-10-26 RX ADMIN — Medication 15 MILLIGRAM(S): at 16:18

## 2020-10-26 RX ADMIN — PANTOPRAZOLE SODIUM 40 MILLIGRAM(S): 20 TABLET, DELAYED RELEASE ORAL at 05:27

## 2020-10-26 RX ADMIN — SODIUM CHLORIDE 150 MILLILITER(S): 9 INJECTION INTRAMUSCULAR; INTRAVENOUS; SUBCUTANEOUS at 02:48

## 2020-10-26 RX ADMIN — SODIUM CHLORIDE 150 MILLILITER(S): 9 INJECTION INTRAMUSCULAR; INTRAVENOUS; SUBCUTANEOUS at 10:06

## 2020-10-26 RX ADMIN — Medication 15 MILLIGRAM(S): at 17:00

## 2020-10-26 RX ADMIN — AMLODIPINE BESYLATE 5 MILLIGRAM(S): 2.5 TABLET ORAL at 10:06

## 2020-10-26 RX ADMIN — INSULIN GLARGINE 10 UNIT(S): 100 INJECTION, SOLUTION SUBCUTANEOUS at 21:02

## 2020-10-26 RX ADMIN — Medication 1: at 21:05

## 2020-10-26 RX ADMIN — Medication 15 MILLIGRAM(S): at 06:13

## 2020-10-26 RX ADMIN — Medication 15 MILLIGRAM(S): at 06:48

## 2020-10-26 NOTE — PROGRESS NOTE ADULT - SUBJECTIVE AND OBJECTIVE BOX
GI follow up    Patient is a 70y old  Male who presents with a chief complaint of Abdominal pain (25 Oct 2020 14:16)    HPI:  pain worse with clears and requires pain meds  neg N/V  neg cp or sob  +flatus    REVIEW OF SYSTEMS:    CONSTITUTIONAL: No weakness, fevers or chills  EYES/ENT: No visual changes;  No vertigo or throat pain   NECK: No pain or stiffness  RESPIRATORY: No cough, wheezing, hemoptysis; No shortness of breath  CARDIOVASCULAR: No chest pain or palpitations  GENITOURINARY: No dysuria, frequency or hematuria  NEUROLOGICAL: No numbness or weakness  SKIN: No itching, burning, rashes, or lesions   All other review of systems is negative unless indicated above.    PHYSICAL EXAM:  Vital Signs Last 24 Hrs  T(C): 36.6 (26 Oct 2020 09:30), Max: 36.8 (25 Oct 2020 21:05)  T(F): 97.9 (26 Oct 2020 09:30), Max: 98.2 (25 Oct 2020 21:05)  HR: 66 (26 Oct 2020 09:30) (62 - 66)  BP: 174/82 (26 Oct 2020 09:30) (156/78 - 174/82)  BP(mean): --  RR: 17 (26 Oct 2020 09:30) (16 - 17)  SpO2: 98% (26 Oct 2020 09:30) (97% - 98%)    Constitutional: NAD, well-developed  HEENT: EOMI, throat clear  Neck: No LAD, supple  Respiratory: CTA and P  Cardiovascular: S1 and S2, RRR, no M  Gastrointestinal: BS+, soft, NT/ND, neg HSM,  Extremities: No peripheral edema, neg clubbing cyanosis  Vascular: 2+ peripheral pulses  Neurological: A/O x 3, no focal deficits  Psychiatric: Normal mood, normal affect  Skin: No rashes                          13.0   5.56  )-----------( 242      ( 26 Oct 2020 08:14 )             39.5     10-26    142  |  110<H>  |  7   ----------------------------<  126<H>  4.2   |  25  |  0.98    Ca    9.0      26 Oct 2020 08:14    TPro  6.6  /  Alb  2.6<L>  /  TBili  0.4  /  DBili  x   /  AST  16  /  ALT  23  /  AlkPhos  75  10-26    normal Trig

## 2020-10-26 NOTE — PROGRESS NOTE ADULT - ASSESSMENT
69 y/o M PMHx as noted above admitted for further evaluation and management of acute abdominal pain consistent with acute pancreatitis.    #Acute pancreatitis  - 4 mg Morphine x 2 given on the ED, continue pain management   - on clear liquid diet, advance diet as tolerated  - Lipase 874 -> 174  - CT: Evidence of acute pancreatitis predominantly involving the pancreatic head and neck.  - 2L NS given on the ED, Continue IV hydration   - GI recs appreciated    #Diabetes Mellitus  -Patient takes 30 units Lantus at bedtime on a regular basis, due to decrease PO intake.  Consider increase to 30 units once the diet status is advanced.  -lantus 10 units qhs   -ISS  -accuchecks  -lantus held as BGM in 120s    #HTN  - Continue amlodipine 5 mg    #DVT ppx  - SCDs    #Advance directives  - Emergency contact Thom Graves [phone]. Works in the lab at [employer] 69 y/o M PMHx as noted above admitted for further evaluation and management of acute abdominal pain consistent with acute pancreatitis.    #Acute pancreatitis  - 4 mg Morphine x 2 given on the ED, continue pain management w/ toradol PRN for moderate pain and Dilaudid PRN for severe pain  - on clear liquid diet, advance diet as tolerated  - Lipase 874 -> 174  - CT: Evidence of acute pancreatitis predominantly involving the pancreatic head and neck.  - 2L NS given on the ED, Continue IV hydration   - GI recs appreciated    #Diabetes Mellitus  -Patient takes 30 units Lantus at bedtime on a regular basis  -ISS  -accuchecks  -will hold lantus as BGM in 120s    #HTN  - Continue amlodipine 5 mg    #DVT ppx  - SCDs    #Advance directives  - Emergency contact Thom Graves [phone]. Works in the lab at [facility]

## 2020-10-26 NOTE — PROGRESS NOTE ADULT - ASSESSMENT
70M with diabetes and recurrent mild pancreatitis of unclear etiology.  No EtOH use, s/p cholecystectomy, normal LFTs and normal triglycerides.       Rec:  -cont clears and clinical follow up, advance diet as torrey  -consider outpatient MRI pancreas or EUS when resolved to r/o pancreas pathology  -follow up with Dr. Mann after discharge  -d/w pt in detail

## 2020-10-26 NOTE — PROGRESS NOTE ADULT - ASSESSMENT
Pt has been seen and examined with FP resident, resident supervised agree with a/p       Patient is a 70y old  Male who presents with a chief complaint of Abdominal pain (26 Oct 2020 09:53)          PHYSICAL EXAM:  Vital Signs Last 24 Hrs  T(C): 36.6 (26 Oct 2020 09:30), Max: 36.8 (25 Oct 2020 21:05)  T(F): 97.9 (26 Oct 2020 09:30), Max: 98.2 (25 Oct 2020 21:05)  HR: 66 (26 Oct 2020 09:30) (62 - 66)  BP: 174/82 (26 Oct 2020 09:30) (156/78 - 174/82)  BP(mean): --  RR: 17 (26 Oct 2020 09:30) (16 - 17)  SpO2: 98% (26 Oct 2020 09:30) (97% - 98%)  -rs-aeeb, cta  -cvs-s1s2 normal   -p/a-soft,bs+      A/P    #did not tolerate diet yesterday, ct monitor and advance diet as tolerated for pancreatitis     #dvt pr

## 2020-10-27 LAB
HCT VFR BLD CALC: 40 % — SIGNIFICANT CHANGE UP (ref 39–50)
HGB BLD-MCNC: 12.9 G/DL — LOW (ref 13–17)
MCHC RBC-ENTMCNC: 28.5 PG — SIGNIFICANT CHANGE UP (ref 27–34)
MCHC RBC-ENTMCNC: 32.3 GM/DL — SIGNIFICANT CHANGE UP (ref 32–36)
MCV RBC AUTO: 88.3 FL — SIGNIFICANT CHANGE UP (ref 80–100)
PLATELET # BLD AUTO: 261 K/UL — SIGNIFICANT CHANGE UP (ref 150–400)
RBC # BLD: 4.53 M/UL — SIGNIFICANT CHANGE UP (ref 4.2–5.8)
RBC # FLD: 12.7 % — SIGNIFICANT CHANGE UP (ref 10.3–14.5)
WBC # BLD: 6.5 K/UL — SIGNIFICANT CHANGE UP (ref 3.8–10.5)
WBC # FLD AUTO: 6.5 K/UL — SIGNIFICANT CHANGE UP (ref 3.8–10.5)

## 2020-10-27 PROCEDURE — 99232 SBSQ HOSP IP/OBS MODERATE 35: CPT | Mod: GC

## 2020-10-27 RX ORDER — SODIUM CHLORIDE 9 MG/ML
1000 INJECTION INTRAMUSCULAR; INTRAVENOUS; SUBCUTANEOUS
Refills: 0 | Status: DISCONTINUED | OUTPATIENT
Start: 2020-10-27 | End: 2020-10-27

## 2020-10-27 RX ORDER — ENOXAPARIN SODIUM 100 MG/ML
40 INJECTION SUBCUTANEOUS DAILY
Refills: 0 | Status: DISCONTINUED | OUTPATIENT
Start: 2020-10-27 | End: 2020-10-28

## 2020-10-27 RX ORDER — METFORMIN HYDROCHLORIDE 850 MG/1
1 TABLET ORAL
Qty: 0 | Refills: 0 | DISCHARGE

## 2020-10-27 RX ADMIN — Medication 2: at 17:12

## 2020-10-27 RX ADMIN — Medication 15 MILLIGRAM(S): at 02:35

## 2020-10-27 RX ADMIN — Medication 15 MILLIGRAM(S): at 02:14

## 2020-10-27 RX ADMIN — SODIUM CHLORIDE 75 MILLILITER(S): 9 INJECTION INTRAMUSCULAR; INTRAVENOUS; SUBCUTANEOUS at 16:02

## 2020-10-27 RX ADMIN — Medication 15 MILLIGRAM(S): at 22:37

## 2020-10-27 RX ADMIN — Medication 15 MILLIGRAM(S): at 21:26

## 2020-10-27 RX ADMIN — Medication 30 MILLILITER(S): at 21:34

## 2020-10-27 RX ADMIN — Medication 30 MILLILITER(S): at 14:30

## 2020-10-27 RX ADMIN — SODIUM CHLORIDE 150 MILLILITER(S): 9 INJECTION INTRAMUSCULAR; INTRAVENOUS; SUBCUTANEOUS at 08:19

## 2020-10-27 RX ADMIN — PANTOPRAZOLE SODIUM 40 MILLIGRAM(S): 20 TABLET, DELAYED RELEASE ORAL at 06:06

## 2020-10-27 RX ADMIN — INSULIN GLARGINE 10 UNIT(S): 100 INJECTION, SOLUTION SUBCUTANEOUS at 21:24

## 2020-10-27 RX ADMIN — AMLODIPINE BESYLATE 5 MILLIGRAM(S): 2.5 TABLET ORAL at 10:49

## 2020-10-27 RX ADMIN — HYDROMORPHONE HYDROCHLORIDE 1 MILLIGRAM(S): 2 INJECTION INTRAMUSCULAR; INTRAVENOUS; SUBCUTANEOUS at 00:21

## 2020-10-27 RX ADMIN — HYDROMORPHONE HYDROCHLORIDE 1 MILLIGRAM(S): 2 INJECTION INTRAMUSCULAR; INTRAVENOUS; SUBCUTANEOUS at 00:45

## 2020-10-27 NOTE — PROGRESS NOTE ADULT - ATTENDING COMMENTS
-rs-aeeb, cta  -p/a-soft, bs+  -cvs-s1s2 normal     A/P    #ct iv fluids and monitoring     #clinically better, advance diet as tolerated
-rs-aeeb, cta  -p/a-soft, tender on deep palpation, bs+  -cvs-s1s2 normal     A/P    #  ct npo, clinically monitor, pain control
As above, pt seen and examined with house staff and treatment plan formulated on rounds.

## 2020-10-27 NOTE — CHART NOTE - NSCHARTNOTEFT_GEN_A_CORE
Pt seen and examined with house staff.  Plan formulated and reviewed on rounds     Briefly,  69 y/o male with HTN, DM and h/o pancreatitis admitted with acute pancreatitis.  Has had issues in the past with DM meds usage resulting in AP.  Presently on metformin and lantus  Feels better  No events overnight   ROS o/w negative     Did OK with clears    Awake and alert  NAD  Stable vitals    Grossly non focal   Abd soft    Labs reviewed  Lipase normal  Triglyceride normal    Advance diet  Can DC IVF if tolerating PO  FS with insulin coverage--keep FS < 180  OOB  DVT prophy    Med surg

## 2020-10-27 NOTE — PROGRESS NOTE ADULT - ASSESSMENT
71 y/o M PMHx as noted above admitted for further evaluation and management of acute abdominal pain consistent with acute pancreatitis.    #Acute pancreatitis  -4 mg Morphine x 2 given on the ED, continue pain management w/ toradol PRN for moderate pain and Dilaudid PRN for severe pain  -tolerating full liquid diet  -Lipase 874 -> 174  -CT: Evidence of acute pancreatitis predominantly involving the pancreatic head and neck.  -2L NS given on the ED, Continue IV hydration   -GI recs appreciated    #Diabetes Mellitus  -Patient takes 30 units Lantus at bedtime on a regular basis  -ISS  -accuchecks  -will give 10 units tonight, latest bgm 213    #HTN  -Continue amlodipine 5 mg    #DVT ppx  - SCDs    #Advance directives   - Emergency contact Thom Graves [phone]. 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    #Acute pancreatitis  -4 mg Morphine x 2 given on the ED, continue pain management w/ toradol PRN for moderate pain and Dilaudid PRN for severe pain  -tolerating full liquid diet, will stop IV fluids  -Lipase 874 -> 174  -CT: Evidence of acute pancreatitis predominantly involving the pancreatic head and neck.  -2L NS given on the ED, Continue IV hydration   -GI recs appreciated    #Diabetes Mellitus  -Patient takes 30 units Lantus at bedtime on a regular basis  -ISS  -accuchecks  -will give 10 units tonight, latest bgm 213    #HTN  -Continue amlodipine 5 mg    #DVT ppx  - SCDs    #Advance directives   - Emergency contact Thom Graves [phone]. 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    #Acute pancreatitis  -4 mg Morphine x 2 given on the ED, continue pain management w/ toradol PRN for moderate pain and Dilaudid PRN for severe pain  -2L NS given on the ED   -tolerating full liquid diet, will d/c IV fluids  -Lipase 874 -> 174  -CT: Evidence of acute pancreatitis predominantly involving the pancreatic head and neck.  -GI recs appreciated    #Diabetes Mellitus  -Patient takes 30 units Lantus at bedtime on a regular basis  -ISS  -accuchecks  -will give 10 units tonight, latest bgm 213    #HTN  -Continue amlodipine 5 mg    #DVT ppx  - SCDs    #Advance directives   - Emergency contact Thom Graves [phone]. 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    #Acute pancreatitis  -CT: Evidence of acute pancreatitis predominantly involving the pancreatic head and neck  -4 mg Morphine x 2 given on the ED, continue pain management w/ toradol PRN for moderate pain and Dilaudid PRN for severe pain  -2L NS given on the ED   -Lipase 874 -> 174  -lipid panel WNL  -GI recs appreciated: consider outpatient MRI pancreas or EUS when resolved to r/o pancreas pathology  -tolerating full liquid diet, will d/c IV fluids. Advance diet as tolerated    #Diabetes Mellitus  -Patient takes 30 units Lantus at bedtime on a regular basis  -ISS  -accuchecks  -will give 10 units tonight, latest bgm 213    #HTN  -Continue amlodipine 5 mg    #DVT ppx  - SCDs    #Advance directives   - Emergency contact Thom Graves [phone]. Works in the lab at [facility]

## 2020-10-27 NOTE — PROGRESS NOTE ADULT - SUBJECTIVE AND OBJECTIVE BOX
HPI: 71 yo male with PMHx of DM, HTN and prior history of pancreatitis.  Came in to the ED with severe abdominal pain 10/10, That started 3 days prior to admission after eating fried food.  He describes the pain as sharp/pressure like that starts on the epigastric are and radiates to the right side of the abdomen and when it gets worse it goes all the way to the back.  He tried taking OTC medications, but the pain did not improve. The pain gets worse whenever he eats or drinks and feels better when he's stomach is empty.  He was here in August with similar pain and it was also associated with food.  Patient denies any headache, SOB, palpitations, N/V, diarrhea, constipation.  Patient denies tobacco or EtOH intake.     Subjective: Pt seen at bedside, AOx3, NAD. Pt seen at bedside. Tolerating clear liquid diet. Abdominal pain improved today.    REVIEW OF SYSTEMS:  CONSTITUTIONAL: No weakness, fevers or chills  EYES/ENT: No visual changes;  No vertigo or throat pain   NECK: No pain or stiffness  RESPIRATORY: No cough, wheezing, hemoptysis; No shortness of breath  CARDIOVASCULAR: No chest pain or palpitations  GASTROINTESTINAL: + abdominal and epigastric pain improving. No nausea, vomiting, or hematemesis; No diarrhea or constipation. No melena or hematochezia.  GENITOURINARY: No dysuria, frequency or hematuria  NEUROLOGICAL: No numbness or weakness  SKIN: No itching, burning, rashes, or lesions   All other review of systems is negative unless indicated above    Vital Signs Last 24 Hrs  T(C): 36.8 (27 Oct 2020 15:28), Max: 36.8 (27 Oct 2020 15:28)  T(F): 98.2 (27 Oct 2020 15:28), Max: 98.2 (27 Oct 2020 15:28)  HR: 63 (27 Oct 2020 15:28) (63 - 63)  BP: 170/81 (27 Oct 2020 15:28) (169/92 - 170/81)  BP(mean): --  RR: 18 (27 Oct 2020 15:28) (17 - 18)  SpO2: 98% (27 Oct 2020 15:28) (96% - 98%)    CAPILLARY BLOOD GLUCOSE      POCT Blood Glucose.: 213 mg/dL (27 Oct 2020 17:05)  POCT Blood Glucose.: 134 mg/dL (27 Oct 2020 11:46)  POCT Blood Glucose.: 134 mg/dL (27 Oct 2020 08:14)  POCT Blood Glucose.: 262 mg/dL (26 Oct 2020 21:02)      PHYSICAL EXAM:  Constitutional: NAD, awake and alert, well-developed  HEENT: PERR, EOMI, Normal Hearing, MMM  Neck: Soft and supple, No LAD  Respiratory: Breath sounds are clear bilaterally, No wheezing, rales or rhonchi  Cardiovascular: S1 and S2, regular rate and rhythm, no Murmurs, gallops or rubs  Gastrointestinal: Bowel Sounds present, mild TTP in epigastric region, soft, nondistended, no guarding, no rebound  Extremities: No peripheral edema  Vascular: 2+ peripheral pulses  Neurological: A/O x 3, no focal deficits  Musculoskeletal: 5/5 strength b/l upper and lower extremities  Skin: No rashes    MEDICATIONS:  MEDICATIONS  (STANDING):  amLODIPine   Tablet 5 milliGRAM(s) Oral daily  dextrose 5%. 1000 milliLiter(s) (50 mL/Hr) IV Continuous <Continuous>  dextrose 50% Injectable 12.5 Gram(s) IV Push once  dextrose 50% Injectable 25 Gram(s) IV Push once  dextrose 50% Injectable 25 Gram(s) IV Push once  insulin glargine Injectable (LANTUS) 10 Unit(s) SubCutaneous at bedtime  insulin lispro (ADMELOG) corrective regimen sliding scale   SubCutaneous three times a day before meals  insulin lispro (ADMELOG) corrective regimen sliding scale   SubCutaneous at bedtime  pantoprazole    Tablet 40 milliGRAM(s) Oral before breakfast  sodium chloride 0.9%. 1000 milliLiter(s) (150 mL/Hr) IV Continuous <Continuous>      Labs                        12.9   6.50  )-----------( 261      ( 27 Oct 2020 08:01 )             40.0   10-26    142  |  110<H>  |  7   ----------------------------<  126<H>  4.2   |  25  |  0.98    Ca    9.0      26 Oct 2020 08:14    TPro  6.6  /  Alb  2.6<L>  /  TBili  0.4  /  DBili  x   /  AST  16  /  ALT  23  /  AlkPhos  75  10-26        RADIOLOGY/EKG:  < from: CT Abdomen and Pelvis w/ IV Cont (10.23.20 @ 19:01) >  IMPRESSION:  Evidence of acute pancreatitis predominantly involving the pancreatic head and neck.    < end of copied text >   HPI: 71 yo male with PMHx of DM, HTN and prior history of pancreatitis.  Came in to the ED with severe abdominal pain 10/10, That started 3 days prior to admission after eating fried food.  He describes the pain as sharp/pressure like that starts on the epigastric are and radiates to the right side of the abdomen and when it gets worse it goes all the way to the back.  He tried taking OTC medications, but the pain did not improve. The pain gets worse whenever he eats or drinks and feels better when he's stomach is empty.  He was here in August with similar pain and it was also associated with food.  Patient denies any headache, SOB, palpitations, N/V, diarrhea, constipation.  Patient denies tobacco or EtOH intake.     Subjective: Pt seen at bedside, AOx3, NAD. Pt seen at bedside. Tolerating clear liquid diet. Abdominal pain much improved today.    REVIEW OF SYSTEMS:  CONSTITUTIONAL: No weakness, fevers or chills  EYES/ENT: No visual changes;  No vertigo or throat pain   NECK: No pain or stiffness  RESPIRATORY: No cough, wheezing, hemoptysis; No shortness of breath  CARDIOVASCULAR: No chest pain or palpitations  GASTROINTESTINAL: + mild TTP improving. No nausea, vomiting, or hematemesis; No diarrhea or constipation. No melena or hematochezia.  GENITOURINARY: No dysuria, frequency or hematuria  NEUROLOGICAL: No numbness or weakness  SKIN: No itching, burning, rashes, or lesions   All other review of systems is negative unless indicated above    Vital Signs Last 24 Hrs  T(C): 36.8 (27 Oct 2020 15:28), Max: 36.8 (27 Oct 2020 15:28)  T(F): 98.2 (27 Oct 2020 15:28), Max: 98.2 (27 Oct 2020 15:28)  HR: 63 (27 Oct 2020 15:28) (63 - 63)  BP: 170/81 (27 Oct 2020 15:28) (169/92 - 170/81)  BP(mean): --  RR: 18 (27 Oct 2020 15:28) (17 - 18)  SpO2: 98% (27 Oct 2020 15:28) (96% - 98%)    CAPILLARY BLOOD GLUCOSE      POCT Blood Glucose.: 213 mg/dL (27 Oct 2020 17:05)  POCT Blood Glucose.: 134 mg/dL (27 Oct 2020 11:46)  POCT Blood Glucose.: 134 mg/dL (27 Oct 2020 08:14)  POCT Blood Glucose.: 262 mg/dL (26 Oct 2020 21:02)      PHYSICAL EXAM:  Constitutional: NAD, awake and alert, well-developed  HEENT: PERR, EOMI, Normal Hearing, MMM  Neck: Soft and supple, No LAD  Respiratory: Breath sounds are clear bilaterally, No wheezing, rales or rhonchi  Cardiovascular: S1 and S2, regular rate and rhythm, no Murmurs, gallops or rubs  Gastrointestinal: Bowel Sounds present, mild TTP in epigastric region, soft, nondistended, no guarding, no rebound  Extremities: No peripheral edema  Vascular: 2+ peripheral pulses  Neurological: A/O x 3, no focal deficits  Musculoskeletal: 5/5 strength b/l upper and lower extremities  Skin: No rashes    MEDICATIONS:  MEDICATIONS  (STANDING):  amLODIPine   Tablet 5 milliGRAM(s) Oral daily  dextrose 5%. 1000 milliLiter(s) (50 mL/Hr) IV Continuous <Continuous>  dextrose 50% Injectable 12.5 Gram(s) IV Push once  dextrose 50% Injectable 25 Gram(s) IV Push once  dextrose 50% Injectable 25 Gram(s) IV Push once  insulin glargine Injectable (LANTUS) 10 Unit(s) SubCutaneous at bedtime  insulin lispro (ADMELOG) corrective regimen sliding scale   SubCutaneous three times a day before meals  insulin lispro (ADMELOG) corrective regimen sliding scale   SubCutaneous at bedtime  pantoprazole    Tablet 40 milliGRAM(s) Oral before breakfast  sodium chloride 0.9%. 1000 milliLiter(s) (150 mL/Hr) IV Continuous <Continuous>      Labs                        12.9   6.50  )-----------( 261      ( 27 Oct 2020 08:01 )             40.0   10-26    142  |  110<H>  |  7   ----------------------------<  126<H>  4.2   |  25  |  0.98    Ca    9.0      26 Oct 2020 08:14    TPro  6.6  /  Alb  2.6<L>  /  TBili  0.4  /  DBili  x   /  AST  16  /  ALT  23  /  AlkPhos  75  10-26        RADIOLOGY/EKG:  < from: CT Abdomen and Pelvis w/ IV Cont (10.23.20 @ 19:01) >  IMPRESSION:  Evidence of acute pancreatitis predominantly involving the pancreatic head and neck.    < end of copied text >

## 2020-10-27 NOTE — PHARMACOTHERAPY INTERVENTION NOTE - COMMENTS
Confirmed home medications with patient and Dr. First Med History. Updated patient allergies and preferred outpatient pharmacy and documented in Outpatient Medication Review.

## 2020-10-28 ENCOUNTER — TRANSCRIPTION ENCOUNTER (OUTPATIENT)
Age: 70
End: 2020-10-28

## 2020-10-28 VITALS
HEART RATE: 65 BPM | RESPIRATION RATE: 18 BRPM | TEMPERATURE: 98 F | DIASTOLIC BLOOD PRESSURE: 91 MMHG | SYSTOLIC BLOOD PRESSURE: 170 MMHG | OXYGEN SATURATION: 97 %

## 2020-10-28 LAB
ALBUMIN SERPL ELPH-MCNC: 3 G/DL — LOW (ref 3.3–5)
ALP SERPL-CCNC: 89 U/L — SIGNIFICANT CHANGE UP (ref 40–120)
ALT FLD-CCNC: 34 U/L — SIGNIFICANT CHANGE UP (ref 12–78)
ANION GAP SERPL CALC-SCNC: 4 MMOL/L — LOW (ref 5–17)
AST SERPL-CCNC: 22 U/L — SIGNIFICANT CHANGE UP (ref 15–37)
BILIRUB SERPL-MCNC: 0.4 MG/DL — SIGNIFICANT CHANGE UP (ref 0.2–1.2)
BUN SERPL-MCNC: 8 MG/DL — SIGNIFICANT CHANGE UP (ref 7–23)
CALCIUM SERPL-MCNC: 9.3 MG/DL — SIGNIFICANT CHANGE UP (ref 8.5–10.1)
CHLORIDE SERPL-SCNC: 106 MMOL/L — SIGNIFICANT CHANGE UP (ref 96–108)
CO2 SERPL-SCNC: 29 MMOL/L — SIGNIFICANT CHANGE UP (ref 22–31)
CREAT SERPL-MCNC: 1.1 MG/DL — SIGNIFICANT CHANGE UP (ref 0.5–1.3)
GLUCOSE SERPL-MCNC: 150 MG/DL — HIGH (ref 70–99)
HCT VFR BLD CALC: 41.9 % — SIGNIFICANT CHANGE UP (ref 39–50)
HGB BLD-MCNC: 13.8 G/DL — SIGNIFICANT CHANGE UP (ref 13–17)
MCHC RBC-ENTMCNC: 28.7 PG — SIGNIFICANT CHANGE UP (ref 27–34)
MCHC RBC-ENTMCNC: 32.9 GM/DL — SIGNIFICANT CHANGE UP (ref 32–36)
MCV RBC AUTO: 87.1 FL — SIGNIFICANT CHANGE UP (ref 80–100)
PLATELET # BLD AUTO: 266 K/UL — SIGNIFICANT CHANGE UP (ref 150–400)
POTASSIUM SERPL-MCNC: 3.9 MMOL/L — SIGNIFICANT CHANGE UP (ref 3.5–5.3)
POTASSIUM SERPL-SCNC: 3.9 MMOL/L — SIGNIFICANT CHANGE UP (ref 3.5–5.3)
PROT SERPL-MCNC: 7.6 GM/DL — SIGNIFICANT CHANGE UP (ref 6–8.3)
RBC # BLD: 4.81 M/UL — SIGNIFICANT CHANGE UP (ref 4.2–5.8)
RBC # FLD: 12.8 % — SIGNIFICANT CHANGE UP (ref 10.3–14.5)
SODIUM SERPL-SCNC: 139 MMOL/L — SIGNIFICANT CHANGE UP (ref 135–145)
WBC # BLD: 5.82 K/UL — SIGNIFICANT CHANGE UP (ref 3.8–10.5)
WBC # FLD AUTO: 5.82 K/UL — SIGNIFICANT CHANGE UP (ref 3.8–10.5)

## 2020-10-28 PROCEDURE — 99239 HOSP IP/OBS DSCHRG MGMT >30: CPT | Mod: GC

## 2020-10-28 RX ORDER — AMLODIPINE BESYLATE 2.5 MG/1
1 TABLET ORAL
Qty: 14 | Refills: 0
Start: 2020-10-28 | End: 2020-11-10

## 2020-10-28 RX ORDER — LIPASE/PROTEASE/AMYLASE 16-48-48K
1 CAPSULE,DELAYED RELEASE (ENTERIC COATED) ORAL
Qty: 0 | Refills: 0 | DISCHARGE

## 2020-10-28 RX ORDER — AMLODIPINE BESYLATE 2.5 MG/1
1 TABLET ORAL
Qty: 0 | Refills: 0 | DISCHARGE
Start: 2020-10-28

## 2020-10-28 RX ADMIN — PANTOPRAZOLE SODIUM 40 MILLIGRAM(S): 20 TABLET, DELAYED RELEASE ORAL at 09:17

## 2020-10-28 RX ADMIN — Medication 2: at 11:45

## 2020-10-28 RX ADMIN — AMLODIPINE BESYLATE 5 MILLIGRAM(S): 2.5 TABLET ORAL at 09:17

## 2020-10-28 RX ADMIN — ENOXAPARIN SODIUM 40 MILLIGRAM(S): 100 INJECTION SUBCUTANEOUS at 09:18

## 2020-10-28 NOTE — CHART NOTE - NSCHARTNOTEFT_GEN_A_CORE
Pt seen and examined with house staff.  Plan formulated and reviewed on rounds    Briefly,  69 y/o male with HTN, DM and h/o pancreatitis admitted with acute pancreatitis.  Has had issues in the past with DM meds usage resulting in AP.  Presently on metformin and lantus  Had dinner wo pain then developed abd discomfort several hrs afterwards  Feels better  No events overnight   ROS o/w negative     Awake and alert  NAD  Stable vitals    Grossly non focal   Abd soft    Labs reviewed  Lipase normal  Triglyceride normal    Regular diet for lunch  FS with insulin coverage--keep FS < 180  OOB  DVT prophy    DC home if no abd pain after lunch   DC on lantus and metformin.  Hold Creon as this is the most recent medication added

## 2020-10-28 NOTE — DISCHARGE NOTE PROVIDER - NSDCMRMEDTOKEN_GEN_ALL_CORE_FT
amLODIPine 5 mg oral tablet: 1 tab(s) orally once a day  Centrum oral tablet: 1 tab(s) orally once a day  HumaLOG KwikPen 100 units/mL subcutaneous solution: 8 unit(s) subcutaneous 3 times a day (before meals)  Lantus Solostar Pen 100 units/mL subcutaneous solution: 30 unit(s) subcutaneous once a day (at bedtime)  losartan: ***Patient unsure of dose and could not confirm with Dr. Joyner***  meclizine 12.5 mg oral tablet: 1 tab(s) orally 3 times a day, As Needed  metFORMIN 500 mg oral tablet, extended release: 2 tab(s) orally 2 times a day  pantoprazole 40 mg oral delayed release tablet: 1 tab(s) orally once a day

## 2020-10-28 NOTE — PROGRESS NOTE ADULT - SUBJECTIVE AND OBJECTIVE BOX
GI follow up    Patient is a 70y old  Male who presents with a chief complaint of Abdominal pain (25 Oct 2020 14:16)    HPI:  feeling better, [place] low fat breakfast today  neg N/V  neg cp or sob  +flatus and +BM today    REVIEW OF SYSTEMS:    CONSTITUTIONAL: No weakness, fevers or chills  EYES/ENT: No visual changes;  No vertigo or throat pain   NECK: No pain or stiffness  RESPIRATORY: No cough, wheezing, hemoptysis; No shortness of breath  CARDIOVASCULAR: No chest pain or palpitations  GENITOURINARY: No dysuria, frequency or hematuria  NEUROLOGICAL: No numbness or weakness  SKIN: No itching, burning, rashes, or lesions   All other review of systems is negative unless indicated above.    PHYSICAL EXAM:  Vital Signs Last 24 Hrs  T(C): 36.9 (28 Oct 2020 09:19), Max: 36.9 (28 Oct 2020 09:19)  T(F): 98.4 (28 Oct 2020 09:19), Max: 98.4 (28 Oct 2020 09:19)  HR: 65 (28 Oct 2020 09:19) (63 - 65)  BP: 170/91 (28 Oct 2020 09:19) (148/81 - 170/91)  BP(mean): --  RR: 18 (28 Oct 2020 09:19) (18 - 19)  SpO2: 97% (28 Oct 2020 09:19) (96% - 98%)    Constitutional: NAD, well-developed  HEENT: EOMI, throat clear  Neck: No LAD, supple  Respiratory: CTA and P  Cardiovascular: S1 and S2, RRR, no M  Gastrointestinal: BS+, soft, NT/ND, neg HSM,  Extremities: No peripheral edema, neg clubbing cyanosis  Vascular: 2+ peripheral pulses  Neurological: A/O x 3, no focal deficits  Psychiatric: Normal mood, normal affect  Skin: No rashes

## 2020-10-28 NOTE — DISCHARGE NOTE PROVIDER - CARE PROVIDER_API CALL
Navarro Welch  MEDICINE  [address] [place] [address]  Phone: [phone]  Fax: [phone]  Follow Up Time:     Joe Mann  GASTROENTEROLOGY  [address] [place] [address]  Phone: [phone]  Fax: [phone]  Follow Up Time:

## 2020-10-28 NOTE — DISCHARGE NOTE NURSING/CASE MANAGEMENT/SOCIAL WORK - PATIENT PORTAL LINK FT
You can access the FollowMyHealth Patient Portal offered by [facility] by registering at the following website: [email or web address]. By joining [employer]’s FollowMyHealth portal, you will also be able to view your health information using other applications (apps) compatible with our system.

## 2020-10-28 NOTE — DISCHARGE NOTE PROVIDER - NSDCCPCAREPLAN_GEN_ALL_CORE_FT
PRINCIPAL DISCHARGE DIAGNOSIS  Diagnosis: Acute pancreatitis, unspecified complication status, unspecified pancreatitis type  Assessment and Plan of Treatment: You were diagnosed with inflammation of the your pancreas. Your symptoms have improved. Your CREON was stopped during this admission. At this time, you are asymtomatic. Please follow with your PCP and your Primary Gasteroenterologist Dr. Mann soon after discharge.

## 2020-10-28 NOTE — PROGRESS NOTE ADULT - ASSESSMENT
70M with diabetes and recurrent mild pancreatitis of unclear etiology. Possibly medication related.   No EtOH use, s/p cholecystectomy, normal LFTs and normal triglycerides.       Rec:  -cont low fat diet  -consider outpatient MRI pancreas or EUS when resolved to r/o pancreas pathology  -follow up with Dr. Mann after discharge  -d/w pt in detail  -no GI objection to d/c

## 2020-10-28 NOTE — DISCHARGE NOTE PROVIDER - HOSPITAL COURSE
69 yo male with PMHx of DM, HTN, Hx of cholecystectomy and prior history of pancreatitis (No Hx of alcohol use) came in to the ED with severe abdominal pain 10/10, which started after eating fried food.  Pain was described as sharp/pressure like that starts on the epigastric are and radiates to the right side of the abdomen and when it gets worse it goes all the way to the back.  CT abdomen/pelvis showed acute pancreatitis predominantly involving the pancreatic head and neck. Intial lipase was 874. Patient was admitted to  for acute pancreatitis. Triglycerides were normal, calcium was normal. Patient was initally kept NPO and diet was advanced as tolerated. GI was consulted and recommendation was made to have outpatient EUS and MRI of pancreas as outpatient. Patient symptoms improved over the course of hospitalization and at the time of discharge, patient was asymptomatic and able to tolerate full diet. Repeat lipase was 79. At this time, patient is medically optimized to be discharged home. Patient was started on Creon recently which was stopped and will f/u with primary GI if it needs to be continued.     Subjective: Pt seen at bedside, AOx3, NAD. Pt seen at bedside. Tolerating clear liquid diet. Abdominal pain much improved today.    REVIEW OF SYSTEMS:  CONSTITUTIONAL: No weakness, fevers or chills  EYES/ENT: No visual changes;  No vertigo or throat pain   NECK: No pain or stiffness  RESPIRATORY: No cough, wheezing, hemoptysis; No shortness of breath  CARDIOVASCULAR: No chest pain or palpitations  GASTROINTESTINAL: + mild TTP improving. No nausea, vomiting, or hematemesis; No diarrhea or constipation. No melena or hematochezia.  GENITOURINARY: No dysuria, frequency or hematuria  NEUROLOGICAL: No numbness or weakness  SKIN: No itching, burning, rashes, or lesions   All other review of systems is negative unless indicated above    Vital Signs Last 24 Hrs  T(C): 36.8 (27 Oct 2020 15:28), Max: 36.8 (27 Oct 2020 15:28)  T(F): 98.2 (27 Oct 2020 15:28), Max: 98.2 (27 Oct 2020 15:28)  HR: 63 (27 Oct 2020 15:28) (63 - 63)  BP: 170/81 (27 Oct 2020 15:28) (169/92 - 170/81)  BP(mean): --  RR: 18 (27 Oct 2020 15:28) (17 - 18)  SpO2: 98% (27 Oct 2020 15:28) (96% - 98%)    CAPILLARY BLOOD GLUCOSE      POCT Blood Glucose.: 213 mg/dL (27 Oct 2020 17:05)  POCT Blood Glucose.: 134 mg/dL (27 Oct 2020 11:46)  POCT Blood Glucose.: 134 mg/dL (27 Oct 2020 08:14)  POCT Blood Glucose.: 262 mg/dL (26 Oct 2020 21:02)      PHYSICAL EXAM:  Constitutional: NAD, awake and alert, well-developed  HEENT: PERR, EOMI, Normal Hearing, MMM  Neck: Soft and supple, No LAD  Respiratory: Breath sounds are clear bilaterally, No wheezing, rales or rhonchi  Cardiovascular: S1 and S2, regular rate and rhythm, no Murmurs, gallops or rubs  Gastrointestinal: Bowel Sounds present, mild TTP in epigastric region, soft, nondistended, no guarding, no rebound  Extremities: No peripheral edema  Vascular: 2+ peripheral pulses  Neurological: A/O x 3, no focal deficits  Musculoskeletal: 5/5 strength b/l upper and lower extremities  Skin: No rashes   69 yo male with PMHx of DM, HTN, Hx of cholecystectomy and prior history of pancreatitis (No Hx of alcohol use) came in to the ED with severe abdominal pain 10/10, which started after eating fried food.  Pain was described as sharp/pressure like that starts on the epigastric are and radiates to the right side of the abdomen and when it gets worse it goes all the way to the back.  CT abdomen/pelvis showed acute pancreatitis predominantly involving the pancreatic head and neck. Intial lipase was 874. Patient was admitted to  for acute pancreatitis. Triglycerides were normal, calcium was normal. Patient was initally kept NPO and diet was advanced as tolerated. GI was consulted and recommendation was made to have outpatient EUS and MRI of pancreas as outpatient. Patient symptoms improved over the course of hospitalization and at the time of discharge, patient was asymptomatic and able to tolerate full diet. Repeat lipase was 79. At this time, patient is medically optimized to be discharged home. Patient was started on Creon recently which was stopped and will f/u with primary GI if it needs to be continued.     Subjective: Pt seen at bedside, AOx3, NAD. Pt seen at bedside. Tolerating clear liquid diet. Abdominal pain much improved today.    REVIEW OF SYSTEMS:  CONSTITUTIONAL: No weakness, fevers or chills  EYES/ENT: No visual changes;  No vertigo or throat pain   NECK: No pain or stiffness  RESPIRATORY: No cough, wheezing, hemoptysis; No shortness of breath  CARDIOVASCULAR: No chest pain or palpitations  GASTROINTESTINAL: + mild TTP improving. No nausea, vomiting, or hematemesis; No diarrhea or constipation. No melena or hematochezia.  GENITOURINARY: No dysuria, frequency or hematuria  NEUROLOGICAL: No numbness or weakness  SKIN: No itching, burning, rashes, or lesions   All other review of systems is negative unless indicated above    Vital Signs Last 24 Hrs  T(C): 36.8 (27 Oct 2020 15:28), Max: 36.8 (27 Oct 2020 15:28)  T(F): 98.2 (27 Oct 2020 15:28), Max: 98.2 (27 Oct 2020 15:28)  HR: 63 (27 Oct 2020 15:28) (63 - 63)  BP: 170/81 (27 Oct 2020 15:28) (169/92 - 170/81)  BP(mean): --  RR: 18 (27 Oct 2020 15:28) (17 - 18)  SpO2: 98% (27 Oct 2020 15:28) (96% - 98%)    CAPILLARY BLOOD GLUCOSE      POCT Blood Glucose.: 213 mg/dL (27 Oct 2020 17:05)  POCT Blood Glucose.: 134 mg/dL (27 Oct 2020 11:46)  POCT Blood Glucose.: 134 mg/dL (27 Oct 2020 08:14)  POCT Blood Glucose.: 262 mg/dL (26 Oct 2020 21:02)      PHYSICAL EXAM:  Constitutional: NAD, awake and alert, well-developed  HEENT: PERR, EOMI, Normal Hearing, MMM  Neck: Soft and supple, No LAD  Respiratory: Breath sounds are clear bilaterally, No wheezing, rales or rhonchi  Cardiovascular: S1 and S2, regular rate and rhythm, no Murmurs, gallops or rubs  Gastrointestinal: Bowel Sounds present, mild TTP in epigastric region, soft, nondistended, no guarding, no rebound  Extremities: No peripheral edema  Vascular: 2+ peripheral pulses  Neurological: A/O x 3, no focal deficits  Musculoskeletal: 5/5 strength b/l upper and lower extremities  Skin: No rashes      < from: CT Abdomen and Pelvis w/ IV Cont (10.23.20 @ 19:01) >    EXAM:  CT ABDOMEN AND PELVIS IC                            PROCEDURE DATE:  10/23/2020          INTERPRETATION:  CLINICAL INFORMATION: Upper abdominal pain.    COMPARISON: 8/11/2020    PROCEDURE:  CT of the Abdomen and Pelvis was performed with intravenous contrast.  Intravenous contrast: 90 ml Omnipaque 350. 10 ml discarded.  Oral contrast: None.  Sagittal and coronal reformats were performed.    FINDINGS:    Linear atelectasis and/or fibrosis at both lung bases. Bibasilar bronchiectasis. No evidence of a pleural effusion.    The liver is unremarkable. Status post cholecystectomy.    The spleen is not enlarged and demonstrates no focal abnormality.    Hazy peripancreatic fat planes surrounding the pancreatic neck and pancreatic head consistent with acute pancreatitis. The body and tail of the pancreas is spared. No evidence of free fluid in the retroperitoneal space. The portal vein and splenic vein are patent.    The adrenal glands demonstrate normal size and contour. The kidneys reveal a normal enhanced morphology.    No aortic abnormality. No evidence of retroperitoneal or pelvic lymphadenopathy.    The prostate gland is enlarged. The bladder is unremarkable.    No evidence of intestinal obstruction. No evidence of ascites.    Evaluation of the abdominal wall demonstrates a large umbilical hernia.    Degenerative changes in the lower lumbar spine.    IMPRESSION:  Evidence of acute pancreatitis predominantly involving the pancreatic head and neck

## 2020-11-03 DIAGNOSIS — K85.90 ACUTE PANCREATITIS WITHOUT NECROSIS OR INFECTION, UNSPECIFIED: ICD-10-CM

## 2020-11-03 DIAGNOSIS — I10 ESSENTIAL (PRIMARY) HYPERTENSION: ICD-10-CM

## 2020-11-03 DIAGNOSIS — Z79.4 LONG TERM (CURRENT) USE OF INSULIN: ICD-10-CM

## 2020-11-03 DIAGNOSIS — E11.9 TYPE 2 DIABETES MELLITUS WITHOUT COMPLICATIONS: ICD-10-CM

## 2021-01-04 NOTE — PROGRESS NOTE ADULT - PROBLEM/PLAN-6
Call immediately to report any decreased vision or visual distortion.
DISPLAY PLAN FREE TEXT
DISPLAY PLAN FREE TEXT

## 2021-04-22 NOTE — PROGRESS NOTE ADULT - ASSESSMENT
Patients GI provider:  Dr Marianne Steel    Number to return call: (  [phone]    Reason for call: Pt had formed stool so fecal study couldn't be performed    Scheduled procedure/appointment date if applicable: Apt/procedure 10-6-21 68 y/o male with PMHx of DM type 2, HLD, HTN, and recurrent Pancreatitis, s/p cholecystectomy, who presents to the ED with c/o epigastric abdominal pain x 4 days.     #Acute pancreatitis with history of Recurrent pancreatitis s/p cholecystectomy   - stable for DC  - Tolerating regular diet with pain controlled by the on and off percocet   - Lipid profile WNL  - CT abdomen as above  - possibly medication (trajenta) less than 1% cases  and or DM induced-advised to stop Trajenta  - GI consult appreciated    #DM type 2  - ISS  - On Lantus 30 units qhs at home  - HbA1c 10.2 -outpatient follow up with endocrinology     #HTN  -stable  - continue amlodipine     #DVT ppx  - SCDs  - ambulation as tolerated    #Advance directives  - Full code 68 y/o male with PMHx of DM type 2, HLD, HTN, and recurrent Pancreatitis, s/p cholecystectomy, who presents to the ED with c/o epigastric abdominal pain x 4 days.     #Acute pancreatitis with history of Recurrent pancreatitis s/p cholecystectomy .  - stable for Discharge   - Tolerating regular diet with pain controlled by the on and off percocet   - Lipid profile WNL  - CT abdomen as above-Mild infiltration of the fat in the region of the pancreatic head, likely pancreatitis; correlation is recommended with pancreatic enzymes.  - possibly medication (trajenta) less than 1% cases  and or DM induced-advised to stop Trajenta  - GI consult appreciated    #DM type 2  - ISS  - On Lantus 30 units qhs at home  - HbA1c 10.2 -outpatient follow up with endocrinology     #HTN  -stable  - continue amlodipine     #DVT ppx  - SCDs  - ambulation as tolerated    #Advance directives  - Full code

## 2021-08-10 NOTE — ED ADULT TRIAGE NOTE - NSWEIGHTCALCTOOLDRUG_GEN_A_CORE
Quality 130: Documentation Of Current Medications In The Medical Record: Current Medications Documented
Detail Level: Generalized
 used

## 2022-01-26 NOTE — ED ADULT NURSE NOTE - CAS ELECT INFOMATION PROVIDED
FRANDY  Ventura Tang DC instructions

## 2022-06-07 NOTE — PROGRESS NOTE ADULT - PROVIDER SPECIALTY LIST ADULT
Family Medicine
Gastroenterology
Hospitalist
decreased strength

## 2022-09-28 NOTE — ED PROVIDER NOTE - CARE PLAN
Pt followed by Dr. Goncalves at [facility]   Oncology consult appreciated  rad/onc consult appreciated - family declines transfer to [facility] for WBRT. prefers outpt f/u for cyber knife  pt now transitioned to comfort directed care. Principal Discharge DX:	Recurrent pancreatitis

## 2023-01-10 NOTE — PATIENT PROFILE ADULT - BRADEN MOISTURE
Writer routed to Alyssa Haro CNP   *RX Refill Request     Esther Nobles LPN  Neurosurgery   
(4) rarely moist

## 2023-05-22 ENCOUNTER — NON-APPOINTMENT (OUTPATIENT)
Age: 73
End: 2023-05-22

## 2023-06-27 ENCOUNTER — NON-APPOINTMENT (OUTPATIENT)
Age: 73
End: 2023-06-27

## 2023-07-12 NOTE — ED STATDOCS - CONSTITUTIONAL, MLM
[FreeTextEntry1] : \par Exam as below (with documented exceptions in parentheses):\par \par General:  Healthy appearing man well groomed and without deformities. KPS is 90\par \par Mental Status:  Awake, alert and attentive. Oriented to person, place and time. Recent and remote memory intact. Normal concentration. Fluent spontaneous speech with intact naming and repetition. Normal fund of knowledge.  \par \par Cranial Nerves: II: Full visual fields. III,IV,VI:Pupils round, reactive to light. Full extraocular movements. No nystagmus. V: Normal bilateral bite, facial sensation. VII: No facial weakness. VIII: Hearing intact. IX,X: Palate midline, intact gag. XI:  Sternocleidomastoids normal. XII: Tongue protrudes midline. No dysarthria. \par \par Motor:  Normal tone, bulk and power throughout including arms and legs, proximal and distal. No pronator drift. No abnormal movements. (slowed FFM on right).\par \par Sensation: Normal in arms, legs and trunk to pin, proprioception and vibration. Negative Romberg. \par \par Coordination: No dysmetria or dysdiadochokinesis bilaterally. Normal heel-shin testing. \par \par Gait: Normal including heel and toe walking. Normal station. \par \par Reflexes: Normoactive and symmetric throughout. Absent Babinski bilaterally. \par \par Vascular: No peripheral edema/calf tenderness. \par \par Eyes: Funduscopic exam without papilledema (deferred)\par \par Heart: Regular rate and rhythm. Normal s1-s2. No murmurs, rubs or gallops. \par \par Respiratory: Lungs clear to auscultation bilaterally. \par \par Abdomen: Nontender, non-distended. No hepatosplenomegaly. Normal bowel sounds in four quadrants. \par \par ' normal... well appearing and in no apparent distress.

## 2024-05-17 ENCOUNTER — EMERGENCY (EMERGENCY)
Facility: HOSPITAL | Age: 74
LOS: 0 days | Discharge: HOME | End: 2024-05-17
Attending: EMERGENCY MEDICINE
Payer: MEDICARE

## 2024-05-17 VITALS — HEIGHT: 66 IN | WEIGHT: 179.24 LBS

## 2024-05-17 VITALS
RESPIRATION RATE: 18 BRPM | SYSTOLIC BLOOD PRESSURE: 145 MMHG | HEART RATE: 85 BPM | DIASTOLIC BLOOD PRESSURE: 85 MMHG | TEMPERATURE: 98 F | OXYGEN SATURATION: 99 %

## 2024-05-17 DIAGNOSIS — Z87.19 PERSONAL HISTORY OF OTHER DISEASES OF THE DIGESTIVE SYSTEM: ICD-10-CM

## 2024-05-17 DIAGNOSIS — R10.13 EPIGASTRIC PAIN: ICD-10-CM

## 2024-05-17 DIAGNOSIS — Z79.84 LONG TERM (CURRENT) USE OF ORAL HYPOGLYCEMIC DRUGS: ICD-10-CM

## 2024-05-17 DIAGNOSIS — R74.8 ABNORMAL LEVELS OF OTHER SERUM ENZYMES: ICD-10-CM

## 2024-05-17 DIAGNOSIS — E11.9 TYPE 2 DIABETES MELLITUS WITHOUT COMPLICATIONS: ICD-10-CM

## 2024-05-17 DIAGNOSIS — R07.89 OTHER CHEST PAIN: ICD-10-CM

## 2024-05-17 DIAGNOSIS — Z90.49 ACQUIRED ABSENCE OF OTHER SPECIFIED PARTS OF DIGESTIVE TRACT: ICD-10-CM

## 2024-05-17 DIAGNOSIS — I10 ESSENTIAL (PRIMARY) HYPERTENSION: ICD-10-CM

## 2024-05-17 DIAGNOSIS — Z90.49 ACQUIRED ABSENCE OF OTHER SPECIFIED PARTS OF DIGESTIVE TRACT: Chronic | ICD-10-CM

## 2024-05-17 LAB
ALBUMIN SERPL ELPH-MCNC: 3.1 G/DL — LOW (ref 3.3–5)
ALP SERPL-CCNC: 78 U/L — SIGNIFICANT CHANGE UP (ref 40–120)
ALT FLD-CCNC: 25 U/L — SIGNIFICANT CHANGE UP (ref 12–78)
ANION GAP SERPL CALC-SCNC: 6 MMOL/L — SIGNIFICANT CHANGE UP (ref 5–17)
APPEARANCE UR: CLEAR — SIGNIFICANT CHANGE UP
AST SERPL-CCNC: 23 U/L — SIGNIFICANT CHANGE UP (ref 15–37)
BASOPHILS # BLD AUTO: 0.04 K/UL — SIGNIFICANT CHANGE UP (ref 0–0.2)
BASOPHILS NFR BLD AUTO: 0.5 % — SIGNIFICANT CHANGE UP (ref 0–2)
BILIRUB SERPL-MCNC: 0.6 MG/DL — SIGNIFICANT CHANGE UP (ref 0.2–1.2)
BILIRUB UR-MCNC: NEGATIVE — SIGNIFICANT CHANGE UP
BUN SERPL-MCNC: 22 MG/DL — SIGNIFICANT CHANGE UP (ref 7–23)
CALCIUM SERPL-MCNC: 8.9 MG/DL — SIGNIFICANT CHANGE UP (ref 8.5–10.1)
CHLORIDE SERPL-SCNC: 107 MMOL/L — SIGNIFICANT CHANGE UP (ref 96–108)
CO2 SERPL-SCNC: 24 MMOL/L — SIGNIFICANT CHANGE UP (ref 22–31)
COLOR SPEC: YELLOW — SIGNIFICANT CHANGE UP
CREAT SERPL-MCNC: 1.16 MG/DL — SIGNIFICANT CHANGE UP (ref 0.5–1.3)
DIFF PNL FLD: NEGATIVE — SIGNIFICANT CHANGE UP
EGFR: 65 ML/MIN/1.73M2 — SIGNIFICANT CHANGE UP
EOSINOPHIL # BLD AUTO: 0.14 K/UL — SIGNIFICANT CHANGE UP (ref 0–0.5)
EOSINOPHIL NFR BLD AUTO: 1.7 % — SIGNIFICANT CHANGE UP (ref 0–6)
GLUCOSE SERPL-MCNC: 239 MG/DL — HIGH (ref 70–99)
GLUCOSE UR QL: >=1000 MG/DL
HCT VFR BLD CALC: 44.7 % — SIGNIFICANT CHANGE UP (ref 39–50)
HCV AB S/CO SERPL IA: 0.15 S/CO — SIGNIFICANT CHANGE UP (ref 0–0.99)
HCV AB SERPL-IMP: SIGNIFICANT CHANGE UP
HGB BLD-MCNC: 14.6 G/DL — SIGNIFICANT CHANGE UP (ref 13–17)
HIV 1 & 2 AB SERPL IA.RAPID: SIGNIFICANT CHANGE UP
IMM GRANULOCYTES NFR BLD AUTO: 0.2 % — SIGNIFICANT CHANGE UP (ref 0–0.9)
KETONES UR-MCNC: ABNORMAL MG/DL
LEUKOCYTE ESTERASE UR-ACNC: NEGATIVE — SIGNIFICANT CHANGE UP
LIDOCAIN IGE QN: 146 U/L — HIGH (ref 13–75)
LYMPHOCYTES # BLD AUTO: 2.31 K/UL — SIGNIFICANT CHANGE UP (ref 1–3.3)
LYMPHOCYTES # BLD AUTO: 28.7 % — SIGNIFICANT CHANGE UP (ref 13–44)
MCHC RBC-ENTMCNC: 29.2 PG — SIGNIFICANT CHANGE UP (ref 27–34)
MCHC RBC-ENTMCNC: 32.7 GM/DL — SIGNIFICANT CHANGE UP (ref 32–36)
MCV RBC AUTO: 89.4 FL — SIGNIFICANT CHANGE UP (ref 80–100)
MONOCYTES # BLD AUTO: 0.65 K/UL — SIGNIFICANT CHANGE UP (ref 0–0.9)
MONOCYTES NFR BLD AUTO: 8.1 % — SIGNIFICANT CHANGE UP (ref 2–14)
NEUTROPHILS # BLD AUTO: 4.9 K/UL — SIGNIFICANT CHANGE UP (ref 1.8–7.4)
NEUTROPHILS NFR BLD AUTO: 60.8 % — SIGNIFICANT CHANGE UP (ref 43–77)
NITRITE UR-MCNC: NEGATIVE — SIGNIFICANT CHANGE UP
PH UR: 5.5 — SIGNIFICANT CHANGE UP (ref 5–8)
PLATELET # BLD AUTO: 252 K/UL — SIGNIFICANT CHANGE UP (ref 150–400)
POTASSIUM SERPL-MCNC: 4.2 MMOL/L — SIGNIFICANT CHANGE UP (ref 3.5–5.3)
POTASSIUM SERPL-SCNC: 4.2 MMOL/L — SIGNIFICANT CHANGE UP (ref 3.5–5.3)
PROT SERPL-MCNC: 7.3 GM/DL — SIGNIFICANT CHANGE UP (ref 6–8.3)
PROT UR-MCNC: NEGATIVE MG/DL — SIGNIFICANT CHANGE UP
RBC # BLD: 5 M/UL — SIGNIFICANT CHANGE UP (ref 4.2–5.8)
RBC # FLD: 13.2 % — SIGNIFICANT CHANGE UP (ref 10.3–14.5)
SODIUM SERPL-SCNC: 137 MMOL/L — SIGNIFICANT CHANGE UP (ref 135–145)
SP GR SPEC: >1.03 — HIGH (ref 1–1.03)
TROPONIN I, HIGH SENSITIVITY RESULT: 9.3 NG/L — SIGNIFICANT CHANGE UP
UROBILINOGEN FLD QL: 0.2 MG/DL — SIGNIFICANT CHANGE UP (ref 0.2–1)
WBC # BLD: 8.06 K/UL — SIGNIFICANT CHANGE UP (ref 3.8–10.5)
WBC # FLD AUTO: 8.06 K/UL — SIGNIFICANT CHANGE UP (ref 3.8–10.5)

## 2024-05-17 PROCEDURE — 86703 HIV-1/HIV-2 1 RESULT ANTBDY: CPT

## 2024-05-17 PROCEDURE — 86803 HEPATITIS C AB TEST: CPT

## 2024-05-17 PROCEDURE — 74177 CT ABD & PELVIS W/CONTRAST: CPT | Mod: 26,MC

## 2024-05-17 PROCEDURE — 80053 COMPREHEN METABOLIC PANEL: CPT

## 2024-05-17 PROCEDURE — 36415 COLL VENOUS BLD VENIPUNCTURE: CPT

## 2024-05-17 PROCEDURE — 93010 ELECTROCARDIOGRAM REPORT: CPT

## 2024-05-17 PROCEDURE — 85025 COMPLETE CBC W/AUTO DIFF WBC: CPT

## 2024-05-17 PROCEDURE — 99285 EMERGENCY DEPT VISIT HI MDM: CPT | Mod: 25

## 2024-05-17 PROCEDURE — 36000 PLACE NEEDLE IN VEIN: CPT | Mod: XU

## 2024-05-17 PROCEDURE — 71045 X-RAY EXAM CHEST 1 VIEW: CPT | Mod: 26

## 2024-05-17 PROCEDURE — 83690 ASSAY OF LIPASE: CPT

## 2024-05-17 PROCEDURE — 74177 CT ABD & PELVIS W/CONTRAST: CPT | Mod: MC

## 2024-05-17 PROCEDURE — 99285 EMERGENCY DEPT VISIT HI MDM: CPT

## 2024-05-17 PROCEDURE — 84484 ASSAY OF TROPONIN QUANT: CPT

## 2024-05-17 PROCEDURE — 71045 X-RAY EXAM CHEST 1 VIEW: CPT

## 2024-05-17 PROCEDURE — 81003 URINALYSIS AUTO W/O SCOPE: CPT

## 2024-05-17 PROCEDURE — 93005 ELECTROCARDIOGRAM TRACING: CPT

## 2024-05-17 RX ORDER — ACETAMINOPHEN 500 MG
1000 TABLET ORAL ONCE
Refills: 0 | Status: COMPLETED | OUTPATIENT
Start: 2024-05-17 | End: 2024-05-17

## 2024-05-17 RX ORDER — LIDOCAINE 4 G/100G
5 CREAM TOPICAL ONCE
Refills: 0 | Status: COMPLETED | OUTPATIENT
Start: 2024-05-17 | End: 2024-05-17

## 2024-05-17 RX ORDER — FAMOTIDINE 10 MG/ML
1 INJECTION INTRAVENOUS
Qty: 30 | Refills: 0
Start: 2024-05-17 | End: 2024-06-15

## 2024-05-17 RX ORDER — ONDANSETRON 8 MG/1
1 TABLET, FILM COATED ORAL
Qty: 1 | Refills: 0
Start: 2024-05-17

## 2024-05-17 RX ORDER — SODIUM CHLORIDE 9 MG/ML
1000 INJECTION INTRAMUSCULAR; INTRAVENOUS; SUBCUTANEOUS ONCE
Refills: 0 | Status: COMPLETED | OUTPATIENT
Start: 2024-05-17 | End: 2024-05-17

## 2024-05-17 RX ADMIN — Medication 1000 MILLIGRAM(S): at 11:03

## 2024-05-17 RX ADMIN — Medication 30 MILLILITER(S): at 11:04

## 2024-05-17 RX ADMIN — LIDOCAINE 5 MILLILITER(S): 4 CREAM TOPICAL at 11:04

## 2024-05-17 RX ADMIN — SODIUM CHLORIDE 2000 MILLILITER(S): 9 INJECTION INTRAMUSCULAR; INTRAVENOUS; SUBCUTANEOUS at 09:28

## 2024-05-17 NOTE — ED PROVIDER NOTE - PROGRESS NOTE DETAILS
Renan Gonzales for attending Dr. Low   Pt updated on results for tests, pt's abdomen s/nt, had seen Dr. Mann in the past but unable to reestablish, recommend pt f/u with GI and PMD.

## 2024-05-17 NOTE — ED PROVIDER NOTE - PATIENT PORTAL LINK FT
You can access the FollowMyHealth Patient Portal offered by [facility] by registering at the following website: [email or web address]. By joining [employer]’s FollowMyHealth portal, you will also be able to view your health information using other applications (apps) compatible with our system.

## 2024-05-17 NOTE — ED ADULT TRIAGE NOTE - CHIEF COMPLAINT QUOTE
Pt presents to the ED c/o chest pain since 5/15, worsening yesterday. Denies radiation of pain. Pt states pain is worse with eating and drinking. PMH of DM and HTN. Pt sent for EKG.

## 2024-05-17 NOTE — ED ADULT NURSE NOTE - OBJECTIVE STATEMENT
pt in ed c/o of epigastric pain. pt reports symptoms started 2 days ago and gets worse when he eats. pt denies cardiac hx. piv inserted. blood work sent.

## 2024-05-17 NOTE — ED PROVIDER NOTE - OBJECTIVE STATEMENT
74-year-old patient with past medical history for pancreatitis, type 2 diabetes on metformin, high blood pressure, history of cholecystectomy, presents emerged department with chest discomfort.  Patient states chest pain started about 2 days ago.  Patient noticed that every time he eats or drinks that the pain gets worse.  No nausea/vomiting/diarrhea.  No trauma, no travel, no sick contacts.  Pain not going away and patient came to the emergency department

## 2024-05-17 NOTE — ED PROVIDER NOTE - NSFOLLOWUPINSTRUCTIONS_ED_ALL_ED_FT
Please call and follow up with your doctor in 1-3 days.    Use Tylenol (acetaminophen) 1000mg every 6 hours  as need for pain.   Use Zofran 4mg every 8 hours as need for nausea/vomit.    Avoid drinking alcohol   Avoid eating fatty or greasy food      Return to the Emergency Department for worsening or persistent symptoms, and/or ANY NEW OR CONCERNING SYMPTOMS. If you have issues obtaining follow up, please call: [phone] or [phone]  to obtain a doctor or specialist who takes your insurance in your area.      Pancreatitis    WHAT YOU NEED TO KNOW:    Pancreatitis is inflammation of your pancreas. The pancreas is an organ that makes insulin. It also makes enzymes (digestive juices) that help your body digest food. Pancreatitis may be an acute (short-term) problem that happens only once. It may become a chronic (long-term) problem that comes and goes over time.  Pancreas    DISCHARGE INSTRUCTIONS:    Call your doctor if:    You have severe pain in your abdomen and you are vomiting.    You have a fever.    You continue to lose weight without trying.    Your skin or the whites of your eyes turn yellow.    You have questions or concerns about your condition or care.  Medicines: You may need any of the following:    Prescription pain medicine may be given. Ask your healthcare provider how to take this medicine safely. Some prescription pain medicines contain acetaminophen. Do not take other medicines that contain acetaminophen without talking to your healthcare provider. Too much acetaminophen may cause liver damage. Prescription pain medicine may cause constipation. Ask your healthcare provider how to prevent or treat constipation.    Antibiotics may be given to treat a bacterial infection.    Take your medicine as directed. Contact your healthcare provider if you think your medicine is not helping or if you have side effects. Tell your provider if you are allergic to any medicine. Keep a list of the medicines, vitamins, and herbs you take. Include the amounts, and when and why you take them. Bring the list or the pill bottles to follow-up visits. Carry your medicine list with you in case of an emergency.  Self-care:    Rest when you feel it is needed. Slowly start to do more each day. Return to your usual activities as directed.    Do not drink any alcohol. If you need help to stop drinking, contact the following organization:  Alcoholics Anonymous  Web Address: http://www.aa.org      Ask your healthcare provider or dietitian about the best foods to eat. You may need to eat foods that are low in fat if you have chronic pancreatitis.  Low-Fat Diet      Do not smoke. Nicotine and other chemicals in cigarettes and cigars can cause damage. Ask your healthcare provider for information if you currently smoke and need help to quit. E-cigarettes or smokeless tobacco still contain nicotine. Talk to your healthcare provider before you use these products.  Follow up with your doctor as directed: Write down your questions so you remember to ask them during your visits.

## 2024-05-17 NOTE — ED PROVIDER NOTE - CLINICAL SUMMARY MEDICAL DECISION MAKING FREE TEXT BOX
74-year-old patient presents emerged department with low chest/epigastric pain.  Plan check labs, CT scan

## 2024-05-17 NOTE — ED ADULT NURSE NOTE - ISOLATION TYPE:
Physical Therapy      Patient Name:  Jeannie Hutchins   MRN:  [id number]    Patient not seen today secondary to Patient fatigue. Pt reports just got back to bed and needs to rest.  Will follow-up as able.    Titus Lutz, PTA     None

## 2024-05-18 ENCOUNTER — INPATIENT (INPATIENT)
Facility: HOSPITAL | Age: 74
LOS: 3 days | Discharge: HOME | DRG: 391 | End: 2024-05-22
Attending: FAMILY MEDICINE | Admitting: FAMILY MEDICINE
Payer: MEDICARE

## 2024-05-18 VITALS — WEIGHT: 179.9 LBS | HEIGHT: 66 IN

## 2024-05-18 DIAGNOSIS — Z90.49 ACQUIRED ABSENCE OF OTHER SPECIFIED PARTS OF DIGESTIVE TRACT: Chronic | ICD-10-CM

## 2024-05-18 DIAGNOSIS — K85.90 ACUTE PANCREATITIS WITHOUT NECROSIS OR INFECTION, UNSPECIFIED: ICD-10-CM

## 2024-05-18 LAB
ALBUMIN SERPL ELPH-MCNC: 3.2 G/DL — LOW (ref 3.3–5)
ALP SERPL-CCNC: 77 U/L — SIGNIFICANT CHANGE UP (ref 40–120)
ALT FLD-CCNC: 27 U/L — SIGNIFICANT CHANGE UP (ref 12–78)
ANION GAP SERPL CALC-SCNC: 9 MMOL/L — SIGNIFICANT CHANGE UP (ref 5–17)
AST SERPL-CCNC: 28 U/L — SIGNIFICANT CHANGE UP (ref 15–37)
BASOPHILS # BLD AUTO: 0.06 K/UL — SIGNIFICANT CHANGE UP (ref 0–0.2)
BASOPHILS NFR BLD AUTO: 0.7 % — SIGNIFICANT CHANGE UP (ref 0–2)
BILIRUB SERPL-MCNC: 0.5 MG/DL — SIGNIFICANT CHANGE UP (ref 0.2–1.2)
BUN SERPL-MCNC: 16 MG/DL — SIGNIFICANT CHANGE UP (ref 7–23)
CALCIUM SERPL-MCNC: 8.9 MG/DL — SIGNIFICANT CHANGE UP (ref 8.5–10.1)
CHLORIDE SERPL-SCNC: 104 MMOL/L — SIGNIFICANT CHANGE UP (ref 96–108)
CO2 SERPL-SCNC: 22 MMOL/L — SIGNIFICANT CHANGE UP (ref 22–31)
CREAT SERPL-MCNC: 1.01 MG/DL — SIGNIFICANT CHANGE UP (ref 0.5–1.3)
EGFR: 77 ML/MIN/1.73M2 — SIGNIFICANT CHANGE UP
EOSINOPHIL # BLD AUTO: 0.2 K/UL — SIGNIFICANT CHANGE UP (ref 0–0.5)
EOSINOPHIL NFR BLD AUTO: 2.3 % — SIGNIFICANT CHANGE UP (ref 0–6)
GLUCOSE BLDC GLUCOMTR-MCNC: 98 MG/DL — SIGNIFICANT CHANGE UP (ref 70–99)
GLUCOSE SERPL-MCNC: 122 MG/DL — HIGH (ref 70–99)
HCT VFR BLD CALC: 45.4 % — SIGNIFICANT CHANGE UP (ref 39–50)
HGB BLD-MCNC: 15.1 G/DL — SIGNIFICANT CHANGE UP (ref 13–17)
IMM GRANULOCYTES NFR BLD AUTO: 0.2 % — SIGNIFICANT CHANGE UP (ref 0–0.9)
LIDOCAIN IGE QN: 161 U/L — HIGH (ref 13–75)
LYMPHOCYTES # BLD AUTO: 3.85 K/UL — HIGH (ref 1–3.3)
LYMPHOCYTES # BLD AUTO: 45.1 % — HIGH (ref 13–44)
MCHC RBC-ENTMCNC: 29.3 PG — SIGNIFICANT CHANGE UP (ref 27–34)
MCHC RBC-ENTMCNC: 33.3 GM/DL — SIGNIFICANT CHANGE UP (ref 32–36)
MCV RBC AUTO: 88 FL — SIGNIFICANT CHANGE UP (ref 80–100)
MONOCYTES # BLD AUTO: 0.61 K/UL — SIGNIFICANT CHANGE UP (ref 0–0.9)
MONOCYTES NFR BLD AUTO: 7.1 % — SIGNIFICANT CHANGE UP (ref 2–14)
NEUTROPHILS # BLD AUTO: 3.8 K/UL — SIGNIFICANT CHANGE UP (ref 1.8–7.4)
NEUTROPHILS NFR BLD AUTO: 44.6 % — SIGNIFICANT CHANGE UP (ref 43–77)
PLATELET # BLD AUTO: 246 K/UL — SIGNIFICANT CHANGE UP (ref 150–400)
POTASSIUM SERPL-MCNC: 4.4 MMOL/L — SIGNIFICANT CHANGE UP (ref 3.5–5.3)
POTASSIUM SERPL-SCNC: 4.4 MMOL/L — SIGNIFICANT CHANGE UP (ref 3.5–5.3)
PROT SERPL-MCNC: 7.5 GM/DL — SIGNIFICANT CHANGE UP (ref 6–8.3)
RBC # BLD: 5.16 M/UL — SIGNIFICANT CHANGE UP (ref 4.2–5.8)
RBC # FLD: 13 % — SIGNIFICANT CHANGE UP (ref 10.3–14.5)
SODIUM SERPL-SCNC: 135 MMOL/L — SIGNIFICANT CHANGE UP (ref 135–145)
TROPONIN I, HIGH SENSITIVITY RESULT: 7.17 NG/L — SIGNIFICANT CHANGE UP
TROPONIN I, HIGH SENSITIVITY RESULT: 8.21 NG/L — SIGNIFICANT CHANGE UP
WBC # BLD: 8.54 K/UL — SIGNIFICANT CHANGE UP (ref 3.8–10.5)
WBC # FLD AUTO: 8.54 K/UL — SIGNIFICANT CHANGE UP (ref 3.8–10.5)

## 2024-05-18 PROCEDURE — 99285 EMERGENCY DEPT VISIT HI MDM: CPT

## 2024-05-18 RX ORDER — DEXTROSE 50 % IN WATER 50 %
25 SYRINGE (ML) INTRAVENOUS ONCE
Refills: 0 | Status: DISCONTINUED | OUTPATIENT
Start: 2024-05-18 | End: 2024-05-22

## 2024-05-18 RX ORDER — INSULIN GLARGINE 100 [IU]/ML
20 INJECTION, SOLUTION SUBCUTANEOUS AT BEDTIME
Refills: 0 | Status: DISCONTINUED | OUTPATIENT
Start: 2024-05-18 | End: 2024-05-20

## 2024-05-18 RX ORDER — SODIUM CHLORIDE 9 MG/ML
1000 INJECTION INTRAMUSCULAR; INTRAVENOUS; SUBCUTANEOUS
Refills: 0 | Status: DISCONTINUED | OUTPATIENT
Start: 2024-05-18 | End: 2024-05-20

## 2024-05-18 RX ORDER — MECLIZINE HCL 12.5 MG
1 TABLET ORAL
Qty: 0 | Refills: 0 | DISCHARGE

## 2024-05-18 RX ORDER — MULTIVIT-MIN/FERROUS GLUCONATE 9 MG/15 ML
1 LIQUID (ML) ORAL
Qty: 0 | Refills: 0 | DISCHARGE

## 2024-05-18 RX ORDER — BRIMONIDINE TARTRATE 2 MG/MG
1 SOLUTION/ DROPS OPHTHALMIC
Refills: 0 | Status: DISCONTINUED | OUTPATIENT
Start: 2024-05-18 | End: 2024-05-22

## 2024-05-18 RX ORDER — AMLODIPINE BESYLATE 2.5 MG/1
10 TABLET ORAL DAILY
Refills: 0 | Status: DISCONTINUED | OUTPATIENT
Start: 2024-05-18 | End: 2024-05-18

## 2024-05-18 RX ORDER — METOPROLOL TARTRATE 50 MG
25 TABLET ORAL DAILY
Refills: 0 | Status: DISCONTINUED | OUTPATIENT
Start: 2024-05-18 | End: 2024-05-22

## 2024-05-18 RX ORDER — LOSARTAN POTASSIUM 100 MG/1
25 TABLET, FILM COATED ORAL DAILY
Refills: 0 | Status: DISCONTINUED | OUTPATIENT
Start: 2024-05-18 | End: 2024-05-22

## 2024-05-18 RX ORDER — ACETAMINOPHEN 500 MG
1000 TABLET ORAL EVERY 8 HOURS
Refills: 0 | Status: DISCONTINUED | OUTPATIENT
Start: 2024-05-18 | End: 2024-05-22

## 2024-05-18 RX ORDER — ATORVASTATIN CALCIUM 80 MG/1
40 TABLET, FILM COATED ORAL AT BEDTIME
Refills: 0 | Status: DISCONTINUED | OUTPATIENT
Start: 2024-05-18 | End: 2024-05-22

## 2024-05-18 RX ORDER — INSULIN LISPRO 100/ML
6 VIAL (ML) SUBCUTANEOUS
Refills: 0 | Status: DISCONTINUED | OUTPATIENT
Start: 2024-05-18 | End: 2024-05-20

## 2024-05-18 RX ORDER — ONDANSETRON 8 MG/1
4 TABLET, FILM COATED ORAL ONCE
Refills: 0 | Status: COMPLETED | OUTPATIENT
Start: 2024-05-18 | End: 2024-05-18

## 2024-05-18 RX ORDER — GLUCAGON INJECTION, SOLUTION 0.5 MG/.1ML
1 INJECTION, SOLUTION SUBCUTANEOUS ONCE
Refills: 0 | Status: DISCONTINUED | OUTPATIENT
Start: 2024-05-18 | End: 2024-05-22

## 2024-05-18 RX ORDER — DEXTROSE 10 % IN WATER 10 %
125 INTRAVENOUS SOLUTION INTRAVENOUS ONCE
Refills: 0 | Status: DISCONTINUED | OUTPATIENT
Start: 2024-05-18 | End: 2024-05-22

## 2024-05-18 RX ORDER — SODIUM CHLORIDE 9 MG/ML
1000 INJECTION INTRAMUSCULAR; INTRAVENOUS; SUBCUTANEOUS ONCE
Refills: 0 | Status: COMPLETED | OUTPATIENT
Start: 2024-05-18 | End: 2024-05-18

## 2024-05-18 RX ORDER — LANOLIN ALCOHOL/MO/W.PET/CERES
3 CREAM (GRAM) TOPICAL AT BEDTIME
Refills: 0 | Status: DISCONTINUED | OUTPATIENT
Start: 2024-05-18 | End: 2024-05-22

## 2024-05-18 RX ORDER — MORPHINE SULFATE 50 MG/1
4 CAPSULE, EXTENDED RELEASE ORAL ONCE
Refills: 0 | Status: DISCONTINUED | OUTPATIENT
Start: 2024-05-18 | End: 2024-05-18

## 2024-05-18 RX ORDER — MORPHINE SULFATE 50 MG/1
4 CAPSULE, EXTENDED RELEASE ORAL ONCE
Refills: 0 | Status: DISCONTINUED | OUTPATIENT
Start: 2024-05-18 | End: 2024-05-19

## 2024-05-18 RX ORDER — METFORMIN HYDROCHLORIDE 850 MG/1
2 TABLET ORAL
Qty: 0 | Refills: 0 | DISCHARGE

## 2024-05-18 RX ORDER — SODIUM CHLORIDE 9 MG/ML
1000 INJECTION, SOLUTION INTRAVENOUS
Refills: 0 | Status: DISCONTINUED | OUTPATIENT
Start: 2024-05-18 | End: 2024-05-22

## 2024-05-18 RX ORDER — NALOXONE HYDROCHLORIDE 4 MG/.1ML
0.4 SPRAY NASAL ONCE
Refills: 0 | Status: DISCONTINUED | OUTPATIENT
Start: 2024-05-18 | End: 2024-05-22

## 2024-05-18 RX ORDER — SENNA PLUS 8.6 MG/1
2 TABLET ORAL AT BEDTIME
Refills: 0 | Status: DISCONTINUED | OUTPATIENT
Start: 2024-05-18 | End: 2024-05-20

## 2024-05-18 RX ORDER — ONDANSETRON 8 MG/1
4 TABLET, FILM COATED ORAL ONCE
Refills: 0 | Status: DISCONTINUED | OUTPATIENT
Start: 2024-05-18 | End: 2024-05-22

## 2024-05-18 RX ORDER — AMLODIPINE BESYLATE 2.5 MG/1
10 TABLET ORAL DAILY
Refills: 0 | Status: DISCONTINUED | OUTPATIENT
Start: 2024-05-18 | End: 2024-05-22

## 2024-05-18 RX ORDER — FAMOTIDINE 10 MG/ML
40 INJECTION INTRAVENOUS AT BEDTIME
Refills: 0 | Status: DISCONTINUED | OUTPATIENT
Start: 2024-05-18 | End: 2024-05-22

## 2024-05-18 RX ORDER — PANTOPRAZOLE SODIUM 20 MG/1
1 TABLET, DELAYED RELEASE ORAL
Qty: 0 | Refills: 0 | DISCHARGE

## 2024-05-18 RX ORDER — POLYETHYLENE GLYCOL 3350 17 G/17G
17 POWDER, FOR SOLUTION ORAL DAILY
Refills: 0 | Status: DISCONTINUED | OUTPATIENT
Start: 2024-05-18 | End: 2024-05-20

## 2024-05-18 RX ORDER — MULTIVIT-MIN/FERROUS GLUCONATE 9 MG/15 ML
1 LIQUID (ML) ORAL DAILY
Refills: 0 | Status: DISCONTINUED | OUTPATIENT
Start: 2024-05-18 | End: 2024-05-22

## 2024-05-18 RX ORDER — INSULIN LISPRO 100/ML
VIAL (ML) SUBCUTANEOUS
Refills: 0 | Status: DISCONTINUED | OUTPATIENT
Start: 2024-05-18 | End: 2024-05-22

## 2024-05-18 RX ORDER — PANTOPRAZOLE SODIUM 20 MG/1
40 TABLET, DELAYED RELEASE ORAL
Refills: 0 | Status: DISCONTINUED | OUTPATIENT
Start: 2024-05-18 | End: 2024-05-19

## 2024-05-18 RX ORDER — ENOXAPARIN SODIUM 100 MG/ML
40 INJECTION SUBCUTANEOUS EVERY 24 HOURS
Refills: 0 | Status: DISCONTINUED | OUTPATIENT
Start: 2024-05-18 | End: 2024-05-22

## 2024-05-18 RX ORDER — LOSARTAN POTASSIUM 100 MG/1
0 TABLET, FILM COATED ORAL
Qty: 0 | Refills: 0 | DISCHARGE

## 2024-05-18 RX ORDER — DEXTROSE 50 % IN WATER 50 %
15 SYRINGE (ML) INTRAVENOUS ONCE
Refills: 0 | Status: DISCONTINUED | OUTPATIENT
Start: 2024-05-18 | End: 2024-05-22

## 2024-05-18 RX ADMIN — SODIUM CHLORIDE 100 MILLILITER(S): 9 INJECTION INTRAMUSCULAR; INTRAVENOUS; SUBCUTANEOUS at 18:36

## 2024-05-18 RX ADMIN — MORPHINE SULFATE 4 MILLIGRAM(S): 50 CAPSULE, EXTENDED RELEASE ORAL at 18:00

## 2024-05-18 RX ADMIN — ATORVASTATIN CALCIUM 40 MILLIGRAM(S): 80 TABLET, FILM COATED ORAL at 23:56

## 2024-05-18 RX ADMIN — MORPHINE SULFATE 4 MILLIGRAM(S): 50 CAPSULE, EXTENDED RELEASE ORAL at 17:06

## 2024-05-18 RX ADMIN — BRIMONIDINE TARTRATE 1 DROP(S): 2 SOLUTION/ DROPS OPHTHALMIC at 23:58

## 2024-05-18 RX ADMIN — Medication 1000 MILLIGRAM(S): at 23:59

## 2024-05-18 RX ADMIN — FAMOTIDINE 40 MILLIGRAM(S): 10 INJECTION INTRAVENOUS at 23:57

## 2024-05-18 RX ADMIN — ONDANSETRON 4 MILLIGRAM(S): 8 TABLET, FILM COATED ORAL at 17:05

## 2024-05-18 RX ADMIN — ONDANSETRON 4 MILLIGRAM(S): 8 TABLET, FILM COATED ORAL at 18:00

## 2024-05-18 RX ADMIN — SODIUM CHLORIDE 1000 MILLILITER(S): 9 INJECTION INTRAMUSCULAR; INTRAVENOUS; SUBCUTANEOUS at 17:05

## 2024-05-18 NOTE — H&P ADULT - NSHPPHYSICALEXAM_GEN_ALL_CORE
VITALS:  T(F): 97.9 (05-18-24 @ 16:34), Max: 97.9 (05-18-24 @ 16:34)  HR: 84 (05-18-24 @ 16:34) (84 - 84)  BP: 151/84 (05-18-24 @ 16:34) (151/84 - 151/84)  RR: 18 (05-18-24 @ 16:34) (18 - 18)  SpO2: 100% (05-18-24 @ 16:34) (100% - 100%)  PHYSICAL EXAM:  Gen: NAD  HEENT:  pupils equal and reactive, EOMI, no oropharyngeal lesions, erythema, exudates, oral thrush  NECK:   supple, no carotid bruits, no palpable lymph nodes, no thyromegaly  CV:  +S1, +S2, regular, no murmurs or rubs  RESP:   lungs clear to auscultation bilaterally, no wheezing, rales, rhonchi, good air entry bilaterally    GI:  abdomen soft, tender, -distended, slight guarding in RUQ , normal BS, no bruits, no abdominal masses, no palpable masses  RECTAL:  not examined  :  not examined  MSK:   normal muscle tone, no atrophy, no rigidity, no contractions  EXT:  no clubbing, no cyanosis, no edema, no calf pain, swelling or erythema  VASCULAR:  pulses equal and symmetric in the upper and lower extremities  NEURO:  AAOX3, no focal neurological deficits, follows all commands, able to move extremities spontaneously  SKIN:  no ulcers, lesions or rashes

## 2024-05-18 NOTE — H&P ADULT - HISTORY OF PRESENT ILLNESS
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 Took Tylenol without relief. Denies SOB.. He has RUQ pain 6/10 radiating to epigastrium, with no previous Hx of PUD, No Hx of NSAIDs, ASA, use, Hematemesis, Rosalina.   In ED, findings sig for Lipase 161, CT Ab. 10 mm mid pancreatic body focal fat, atrophy, enlarged prostate. EKG-SR 75/min, frequent PVCs, LAD, mild LVH. PPI, Stool for H. Pylori Ag.

## 2024-05-18 NOTE — H&P ADULT - NSHPLABSRESULTS_GEN_ALL_CORE
LABS:                            15.1   8.54  )-----------( 246      ( 18 May 2024 17:00 )             45.4     05-18    135  |  104  |  16  ----------------------------<  122<H>  4.4   |  22  |  1.01    Ca    8.9      18 May 2024 17:00    TPro  7.5  /  Alb  3.2<L>  /  TBili  0.5  /  DBili  x   /  AST  28  /  ALT  27  /  AlkPhos  77  05-18        LIVER FUNCTIONS - ( 18 May 2024 17:00 )  Alb: 3.2 g/dL / Pro: 7.5 gm/dL / ALK PHOS: 77 U/L / ALT: 27 U/L / AST: 28 U/L / GGT: x

## 2024-05-18 NOTE — ED STATDOCS - CLINICAL SUMMARY MEDICAL DECISION MAKING FREE TEXT BOX
Patient with labs demonstrating CBC within normal limits, CMP within normal limits, lipase still elevated 161.  Chest x-ray was performed and family interpreted by myself revealed no acute findings.  Ultrasound was performed in pill interpreted by myself to reveal status postcholecystectomy.  Patient given IV fluids, antiemetics, pain meds.  Patient admitted to medicine service further evaluation management.

## 2024-05-18 NOTE — ED STATDOCS - OBJECTIVE STATEMENT
75 y/o male with PMHx of pancreatitis, DM2 on Metformin, HTN, cholecystectomy; presents to ED c/o mid-sternal chest pain x3 days, workup in [facility] yesterday for same complaint. Patient states he came back today, because PO intake worsens pain and is unable to sleep. Took Tylenol without relief. Denies SOB.

## 2024-05-18 NOTE — ED STATDOCS - PROGRESS NOTE DETAILS
73 y/o M with PMH of pancreatitis, DM2, HTN, s/p cholecystectomy presents with midsternal and epigastric pain. Pt was in ED yesterday for same. Had labs and CT. Labs showed pancreatitis, lipase 146. CT negative, troponin negative x1. pt states he has been unable to tolerate PO since discharge home and pain has been worsening. Denies fever, chills. PE: Well appearing. Cardiac: s1s2, RRR. Lungs: CTAB. Abdomen: NBS x4 soft, +epigastric tenderness. A/P: Pancreatitis, will r/o ACS. Plan for labs, EKG, CXR, reassess. - Catrachito Chau PA-C

## 2024-05-18 NOTE — ED ADULT NURSE NOTE - OBJECTIVE STATEMENT
Pt comes to the ED complaining of epigastric chest discomfort. Pt states that he was seen here in the ED yesterday and was discharged home and told to take tylenol. Pt states that he has not gotten any relief after taking tylenol and that it is only getting worse. Pt returns to the ED today for the epigastric/chest pain. Pt states that the pain is so bad at times that he is short of breath.

## 2024-05-18 NOTE — ED ADULT NURSE NOTE - CHIEF COMPLAINT QUOTE
pt reports chest pain of 3 days. pt was recently at [facility] for same issue yesterday with no relief. no also states he has SOB. no other complaints at this time.

## 2024-05-18 NOTE — ED ADULT TRIAGE NOTE - CHIEF COMPLAINT QUOTE
pt reports chest pain of 3 days. pt was recently at [facility] for same issue yesterday with no relief. no also states he has SOB. no other complaints at this time.

## 2024-05-18 NOTE — H&P ADULT - ASSESSMENT
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 Took Tylenol without relief. Denies SOB.. He has RUQ pain 6/10 radiating to epigastrium, with no previous Hx of PUD, No Hx of NSAIDs, ASA, use, Hematemesis, Rosalina.   In ED, findings sig for Lipase 161, CT Ab. 10 mm mid pancreatic body focal fat, atrophy, enlarged prostate. EKG-SR 75/min, frequent PVCs, LAD, mild LVH. PPI, Stool for H. Pylori Ag.   Principal Diagnosis  #Epigastric pain with minimally inc lipase. - Stool for H Pylori Ag, PPI. Pain Mx. , to R/O PUD, EGD, if symtomatic x 5 yrs as OP.     Active Diagnosis.   #Chest pains with HTN, DM x 10 yrs - EKG SR frequent PVCs, LAD, mild LVH by voltage, ECHO, serial trop, BNP.   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 Took Tylenol without relief. Denies SOB.. He has RUQ pain 6/10 radiating to epigastrium, with no previous Hx of PUD, No Hx of NSAIDs, ASA, use, Hematemesis, Rosalina.   In ED, findings sig for Lipase 161, CT Ab. 10 mm mid pancreatic body focal fat, atrophy, enlarged prostate. EKG-SR 75/min, frequent PVCs, LAD, mild LVH. PPI, Stool for H. Pylori Ag.   Principal Diagnosis  #Epigastric pain with minimally inc lipase. - Stool for H Pylori Ag, PPI. Pain Mx. , to R/O PUD, EGD, if symtomatic x 5 yrs as OP.     Active Diagnosis.   #Chest pains with HTN, DM x 10 yrs - EKG SR frequent PVCs, LAD, mild LVH by voltage, ECHO, serial trop, BNP.   #HTN-uncontrolled - resume home meds,  BB - add Metoprolol Succ. 25 mg QD will be better for PVCs, low dose ARB Losartan 25 mg QD, renal protective effect,  #IDDM- continue Lantus at 2/3 home dose, inpt target FBS < 180 mg/dl , A1C, lipids.   OBS.  FC.  DVT- prophylaxis SC Lovenox   Guarded.  Disp -Home

## 2024-05-19 LAB
A1C WITH ESTIMATED AVERAGE GLUCOSE RESULT: 9.6 % — HIGH (ref 4–5.6)
ANION GAP SERPL CALC-SCNC: 6 MMOL/L — SIGNIFICANT CHANGE UP (ref 5–17)
BASOPHILS # BLD AUTO: 0.03 K/UL — SIGNIFICANT CHANGE UP (ref 0–0.2)
BASOPHILS NFR BLD AUTO: 0.4 % — SIGNIFICANT CHANGE UP (ref 0–2)
BUN SERPL-MCNC: 13 MG/DL — SIGNIFICANT CHANGE UP (ref 7–23)
CALCIUM SERPL-MCNC: 8.8 MG/DL — SIGNIFICANT CHANGE UP (ref 8.5–10.1)
CHLORIDE SERPL-SCNC: 106 MMOL/L — SIGNIFICANT CHANGE UP (ref 96–108)
CHOLEST SERPL-MCNC: 145 MG/DL — SIGNIFICANT CHANGE UP
CO2 SERPL-SCNC: 25 MMOL/L — SIGNIFICANT CHANGE UP (ref 22–31)
CREAT SERPL-MCNC: 1.01 MG/DL — SIGNIFICANT CHANGE UP (ref 0.5–1.3)
EGFR: 77 ML/MIN/1.73M2 — SIGNIFICANT CHANGE UP
EOSINOPHIL # BLD AUTO: 0.23 K/UL — SIGNIFICANT CHANGE UP (ref 0–0.5)
EOSINOPHIL NFR BLD AUTO: 2.9 % — SIGNIFICANT CHANGE UP (ref 0–6)
ESTIMATED AVERAGE GLUCOSE: 229 MG/DL — HIGH (ref 68–114)
GLUCOSE BLDC GLUCOMTR-MCNC: 116 MG/DL — HIGH (ref 70–99)
GLUCOSE BLDC GLUCOMTR-MCNC: 157 MG/DL — HIGH (ref 70–99)
GLUCOSE BLDC GLUCOMTR-MCNC: 54 MG/DL — CRITICAL LOW (ref 70–99)
GLUCOSE BLDC GLUCOMTR-MCNC: 90 MG/DL — SIGNIFICANT CHANGE UP (ref 70–99)
GLUCOSE BLDC GLUCOMTR-MCNC: 92 MG/DL — SIGNIFICANT CHANGE UP (ref 70–99)
GLUCOSE SERPL-MCNC: 116 MG/DL — HIGH (ref 70–99)
HCT VFR BLD CALC: 47 % — SIGNIFICANT CHANGE UP (ref 39–50)
HDLC SERPL-MCNC: 47 MG/DL — SIGNIFICANT CHANGE UP
HGB BLD-MCNC: 15.2 G/DL — SIGNIFICANT CHANGE UP (ref 13–17)
IMM GRANULOCYTES NFR BLD AUTO: 0.4 % — SIGNIFICANT CHANGE UP (ref 0–0.9)
LIPID PNL WITH DIRECT LDL SERPL: 77 MG/DL — SIGNIFICANT CHANGE UP
LYMPHOCYTES # BLD AUTO: 3.09 K/UL — SIGNIFICANT CHANGE UP (ref 1–3.3)
LYMPHOCYTES # BLD AUTO: 39.2 % — SIGNIFICANT CHANGE UP (ref 13–44)
MCHC RBC-ENTMCNC: 29.1 PG — SIGNIFICANT CHANGE UP (ref 27–34)
MCHC RBC-ENTMCNC: 32.3 GM/DL — SIGNIFICANT CHANGE UP (ref 32–36)
MCV RBC AUTO: 89.9 FL — SIGNIFICANT CHANGE UP (ref 80–100)
MONOCYTES # BLD AUTO: 0.75 K/UL — SIGNIFICANT CHANGE UP (ref 0–0.9)
MONOCYTES NFR BLD AUTO: 9.5 % — SIGNIFICANT CHANGE UP (ref 2–14)
NEUTROPHILS # BLD AUTO: 3.76 K/UL — SIGNIFICANT CHANGE UP (ref 1.8–7.4)
NEUTROPHILS NFR BLD AUTO: 47.6 % — SIGNIFICANT CHANGE UP (ref 43–77)
NON HDL CHOLESTEROL: 98 MG/DL — SIGNIFICANT CHANGE UP
NT-PROBNP SERPL-SCNC: 33 PG/ML — SIGNIFICANT CHANGE UP (ref 0–450)
PLATELET # BLD AUTO: 236 K/UL — SIGNIFICANT CHANGE UP (ref 150–400)
POTASSIUM SERPL-MCNC: 3.8 MMOL/L — SIGNIFICANT CHANGE UP (ref 3.5–5.3)
POTASSIUM SERPL-SCNC: 3.8 MMOL/L — SIGNIFICANT CHANGE UP (ref 3.5–5.3)
RBC # BLD: 5.23 M/UL — SIGNIFICANT CHANGE UP (ref 4.2–5.8)
RBC # FLD: 13.2 % — SIGNIFICANT CHANGE UP (ref 10.3–14.5)
SODIUM SERPL-SCNC: 137 MMOL/L — SIGNIFICANT CHANGE UP (ref 135–145)
TRIGL SERPL-MCNC: 115 MG/DL — SIGNIFICANT CHANGE UP
TRIGL SERPL-MCNC: 135 MG/DL — SIGNIFICANT CHANGE UP
WBC # BLD: 7.89 K/UL — SIGNIFICANT CHANGE UP (ref 3.8–10.5)
WBC # FLD AUTO: 7.89 K/UL — SIGNIFICANT CHANGE UP (ref 3.8–10.5)

## 2024-05-19 RX ORDER — PANTOPRAZOLE SODIUM 20 MG/1
40 TABLET, DELAYED RELEASE ORAL
Refills: 0 | Status: DISCONTINUED | OUTPATIENT
Start: 2024-05-19 | End: 2024-05-22

## 2024-05-19 RX ADMIN — Medication 1000 MILLIGRAM(S): at 09:40

## 2024-05-19 RX ADMIN — Medication 25 MILLIGRAM(S): at 09:32

## 2024-05-19 RX ADMIN — PANTOPRAZOLE SODIUM 40 MILLIGRAM(S): 20 TABLET, DELAYED RELEASE ORAL at 21:32

## 2024-05-19 RX ADMIN — BRIMONIDINE TARTRATE 1 DROP(S): 2 SOLUTION/ DROPS OPHTHALMIC at 21:32

## 2024-05-19 RX ADMIN — Medication 1000 MILLIGRAM(S): at 00:30

## 2024-05-19 RX ADMIN — FAMOTIDINE 40 MILLIGRAM(S): 10 INJECTION INTRAVENOUS at 21:32

## 2024-05-19 RX ADMIN — Medication 1 TABLET(S): at 09:31

## 2024-05-19 RX ADMIN — Medication 6 UNIT(S): at 08:45

## 2024-05-19 RX ADMIN — ENOXAPARIN SODIUM 40 MILLIGRAM(S): 100 INJECTION SUBCUTANEOUS at 06:14

## 2024-05-19 RX ADMIN — PANTOPRAZOLE SODIUM 40 MILLIGRAM(S): 20 TABLET, DELAYED RELEASE ORAL at 12:20

## 2024-05-19 RX ADMIN — MORPHINE SULFATE 4 MILLIGRAM(S): 50 CAPSULE, EXTENDED RELEASE ORAL at 10:39

## 2024-05-19 RX ADMIN — SODIUM CHLORIDE 100 MILLILITER(S): 9 INJECTION INTRAMUSCULAR; INTRAVENOUS; SUBCUTANEOUS at 06:17

## 2024-05-19 RX ADMIN — BRIMONIDINE TARTRATE 1 DROP(S): 2 SOLUTION/ DROPS OPHTHALMIC at 09:30

## 2024-05-19 RX ADMIN — INSULIN GLARGINE 20 UNIT(S): 100 INJECTION, SOLUTION SUBCUTANEOUS at 21:32

## 2024-05-19 RX ADMIN — PANTOPRAZOLE SODIUM 40 MILLIGRAM(S): 20 TABLET, DELAYED RELEASE ORAL at 06:12

## 2024-05-19 RX ADMIN — LOSARTAN POTASSIUM 25 MILLIGRAM(S): 100 TABLET, FILM COATED ORAL at 09:32

## 2024-05-19 RX ADMIN — SODIUM CHLORIDE 100 MILLILITER(S): 9 INJECTION INTRAMUSCULAR; INTRAVENOUS; SUBCUTANEOUS at 16:40

## 2024-05-19 RX ADMIN — Medication 1000 MILLIGRAM(S): at 08:47

## 2024-05-19 RX ADMIN — ATORVASTATIN CALCIUM 40 MILLIGRAM(S): 80 TABLET, FILM COATED ORAL at 21:32

## 2024-05-19 RX ADMIN — SENNA PLUS 2 TABLET(S): 8.6 TABLET ORAL at 21:32

## 2024-05-19 RX ADMIN — AMLODIPINE BESYLATE 10 MILLIGRAM(S): 2.5 TABLET ORAL at 09:32

## 2024-05-19 RX ADMIN — MORPHINE SULFATE 4 MILLIGRAM(S): 50 CAPSULE, EXTENDED RELEASE ORAL at 09:38

## 2024-05-19 NOTE — PATIENT PROFILE ADULT - FUNCTIONAL ASSESSMENT - BASIC MOBILITY 6.
4-calculated by average/Not able to assess (calculate score using [facility] averaging method)

## 2024-05-19 NOTE — PROGRESS NOTE ADULT - SUBJECTIVE AND OBJECTIVE BOX
HOSPITALIST ATTENDING PROGRESS NOTE    Chart and meds reviewed.  Patient seen and examined.    CC: Chest pain    Subjective: No acute issues overnight, Still feels epigastric burning. No chest pain     All other systems reviewed and found to be negative with the exception of what has been described above.    MEDICATIONS  (STANDING):  acetaminophen     Tablet .. 1000 milliGRAM(s) Oral every 8 hours  amLODIPine   Tablet 10 milliGRAM(s) Oral daily  atorvastatin 40 milliGRAM(s) Oral at bedtime  brimonidine 0.2% Ophthalmic Solution 1 Drop(s) Both EYES two times a day  dextrose 10% Bolus 125 milliLiter(s) IV Bolus once  dextrose 5%. 1000 milliLiter(s) (50 mL/Hr) IV Continuous <Continuous>  dextrose 50% Injectable 25 Gram(s) IV Push once  enoxaparin Injectable 40 milliGRAM(s) SubCutaneous every 24 hours  famotidine    Tablet 40 milliGRAM(s) Oral at bedtime  glucagon  Injectable 1 milliGRAM(s) IntraMuscular once  insulin glargine Injectable (LANTUS) 20 Unit(s) SubCutaneous at bedtime  insulin lispro (ADMELOG) corrective regimen sliding scale   SubCutaneous three times a day before meals  insulin lispro Injectable (ADMELOG) 6 Unit(s) SubCutaneous three times a day before meals  losartan 25 milliGRAM(s) Oral daily  metoprolol succinate ER 25 milliGRAM(s) Oral daily  multivitamin/minerals 1 Tablet(s) Oral daily  naloxone Injectable 0.4 milliGRAM(s) IV Push once  pantoprazole    Tablet 40 milliGRAM(s) Oral before breakfast  polyethylene glycol 3350 17 Gram(s) Oral daily  senna 2 Tablet(s) Oral at bedtime  sodium chloride 0.9%. 1000 milliLiter(s) (100 mL/Hr) IV Continuous <Continuous>    MEDICATIONS  (PRN):  aluminum hydroxide/magnesium hydroxide/simethicone Suspension 30 milliLiter(s) Oral every 4 hours PRN Dyspepsia  bisacodyl 5 milliGRAM(s) Oral daily PRN Constipation  dextrose Oral Gel 15 Gram(s) Oral once PRN Blood Glucose LESS THAN 70 milliGRAM(s)/deciliter  melatonin 3 milliGRAM(s) Oral at bedtime PRN Insomnia  ondansetron Injectable 4 milliGRAM(s) IV Push once PRN Nausea and/or Vomiting    Vital Signs Last 24 Hrs  T(C): 36.8 (19 May 2024 09:36), Max: 36.8 (19 May 2024 09:36)  T(F): 98.3 (19 May 2024 09:36), Max: 98.3 (19 May 2024 09:36)  HR: 66 (19 May 2024 09:36) (58 - 84)  BP: 149/68 (19 May 2024 09:36) (112/86 - 151/84)  BP(mean): 90 (18 May 2024 16:58) (90 - 90)  RR: 18 (19 May 2024 09:36) (18 - 18)  SpO2: 94% (19 May 2024 09:36) (94% - 100%)    GEN: NAD  HEENT:  pupils equal and reactive, EOMI, no oropharyngeal lesions, erythema, exudates, oral thrush  NECK:   supple, no carotid bruits  CV:  +S1, +S2, regular, no murmurs  RESP:   lungs clear to auscultation bilaterally, no wheezing, rales, rhonchi, good air entry bilaterally  GI:  abdomen soft, non-tender, non-distended, normal BS  EXT:  no clubbing, no cyanosis, no edema, no calf pain, swelling or erythema  NEURO:  AAOX3, no focal neurological deficits, follows all commands, able to move extremities spontaneously  SKIN:  no ulcers, lesions or rashes    LABS:                          15.2   7.89  )-----------( 236      ( 19 May 2024 06:40 )             47.0     05-19    137  |  106  |  13  ----------------------------<  116<H>  3.8   |  25  |  1.01    Ca    8.8      19 May 2024 06:40    TPro  7.5  /  Alb  3.2<L>  /  TBili  0.5  /  DBili  x   /  AST  28  /  ALT  27  /  AlkPhos  77  05-18    LIVER FUNCTIONS - ( 18 May 2024 17:00 )  Alb: 3.2 g/dL / Pro: 7.5 gm/dL / ALK PHOS: 77 U/L / ALT: 27 U/L / AST: 28 U/L / GGT: x           Urinalysis Basic - ( 19 May 2024 06:40 )  Color: x / Appearance: x / SG: x / pH: x  Gluc: 116 mg/dL / Ketone: x  / Bili: x / Urobili: x   Blood: x / Protein: x / Nitrite: x   Leuk Esterase: x / RBC: x / WBC x   Sq Epi: x / Non Sq Epi: x / Bacteria: x     12 Lead ECG (05.17.24 @ 08:15) >  Diagnosis Line Sinus rhythm with Premature ventricular complexes or Fusion complexes  Minimal voltage criteria for LVH, may be normal variant ( R in aVL )  Nonspecific T wave abnormality  Abnormal ECG    < from: US Abdomen Upper Quadrant Right (05.18.24 @ 19:03) >  IMPRESSION:  Borderline common duct likely secondary to cholecystectomy        < from: Xray Chest 2 Views PA/Lat (05.18.24 @ 17:15) >  IMPRESSION:  Unremarkable plain film examination of the chest    < end of copied text >

## 2024-05-19 NOTE — PATIENT PROFILE ADULT - FALL HARM RISK - HARM RISK INTERVENTIONS

## 2024-05-20 ENCOUNTER — RESULT REVIEW (OUTPATIENT)
Age: 74
End: 2024-05-20

## 2024-05-20 LAB
ALBUMIN SERPL ELPH-MCNC: 2.8 G/DL — LOW (ref 3.3–5)
ALP SERPL-CCNC: 74 U/L — SIGNIFICANT CHANGE UP (ref 40–120)
ALT FLD-CCNC: 29 U/L — SIGNIFICANT CHANGE UP (ref 12–78)
ANION GAP SERPL CALC-SCNC: 6 MMOL/L — SIGNIFICANT CHANGE UP (ref 5–17)
AST SERPL-CCNC: 18 U/L — SIGNIFICANT CHANGE UP (ref 15–37)
BILIRUB SERPL-MCNC: 0.4 MG/DL — SIGNIFICANT CHANGE UP (ref 0.2–1.2)
BUN SERPL-MCNC: 10 MG/DL — SIGNIFICANT CHANGE UP (ref 7–23)
CALCIUM SERPL-MCNC: 9.3 MG/DL — SIGNIFICANT CHANGE UP (ref 8.5–10.1)
CHLORIDE SERPL-SCNC: 110 MMOL/L — HIGH (ref 96–108)
CO2 SERPL-SCNC: 25 MMOL/L — SIGNIFICANT CHANGE UP (ref 22–31)
CREAT SERPL-MCNC: 1.07 MG/DL — SIGNIFICANT CHANGE UP (ref 0.5–1.3)
EGFR: 72 ML/MIN/1.73M2 — SIGNIFICANT CHANGE UP
GLUCOSE BLDC GLUCOMTR-MCNC: 133 MG/DL — HIGH (ref 70–99)
GLUCOSE BLDC GLUCOMTR-MCNC: 143 MG/DL — HIGH (ref 70–99)
GLUCOSE BLDC GLUCOMTR-MCNC: 250 MG/DL — HIGH (ref 70–99)
GLUCOSE BLDC GLUCOMTR-MCNC: 78 MG/DL — SIGNIFICANT CHANGE UP (ref 70–99)
GLUCOSE SERPL-MCNC: 145 MG/DL — HIGH (ref 70–99)
HCT VFR BLD CALC: 44.4 % — SIGNIFICANT CHANGE UP (ref 39–50)
HGB BLD-MCNC: 14.5 G/DL — SIGNIFICANT CHANGE UP (ref 13–17)
MCHC RBC-ENTMCNC: 28.9 PG — SIGNIFICANT CHANGE UP (ref 27–34)
MCHC RBC-ENTMCNC: 32.7 GM/DL — SIGNIFICANT CHANGE UP (ref 32–36)
MCV RBC AUTO: 88.6 FL — SIGNIFICANT CHANGE UP (ref 80–100)
PLATELET # BLD AUTO: 234 K/UL — SIGNIFICANT CHANGE UP (ref 150–400)
POTASSIUM SERPL-MCNC: 4 MMOL/L — SIGNIFICANT CHANGE UP (ref 3.5–5.3)
POTASSIUM SERPL-SCNC: 4 MMOL/L — SIGNIFICANT CHANGE UP (ref 3.5–5.3)
PROT SERPL-MCNC: 6.5 GM/DL — SIGNIFICANT CHANGE UP (ref 6–8.3)
RBC # BLD: 5.01 M/UL — SIGNIFICANT CHANGE UP (ref 4.2–5.8)
RBC # FLD: 13.1 % — SIGNIFICANT CHANGE UP (ref 10.3–14.5)
SODIUM SERPL-SCNC: 141 MMOL/L — SIGNIFICANT CHANGE UP (ref 135–145)
WBC # BLD: 5.47 K/UL — SIGNIFICANT CHANGE UP (ref 3.8–10.5)
WBC # FLD AUTO: 5.47 K/UL — SIGNIFICANT CHANGE UP (ref 3.8–10.5)

## 2024-05-20 PROCEDURE — 87449 NOS EACH ORGANISM AG IA: CPT

## 2024-05-20 PROCEDURE — 82150 ASSAY OF AMYLASE: CPT

## 2024-05-20 PROCEDURE — 85025 COMPLETE CBC W/AUTO DIFF WBC: CPT

## 2024-05-20 PROCEDURE — 84443 ASSAY THYROID STIM HORMONE: CPT

## 2024-05-20 PROCEDURE — 99232 SBSQ HOSP IP/OBS MODERATE 35: CPT

## 2024-05-20 PROCEDURE — C9113: CPT

## 2024-05-20 PROCEDURE — 82550 ASSAY OF CK (CPK): CPT

## 2024-05-20 PROCEDURE — 87507 IADNA-DNA/RNA PROBE TQ 12-25: CPT

## 2024-05-20 PROCEDURE — 83690 ASSAY OF LIPASE: CPT

## 2024-05-20 PROCEDURE — 82962 GLUCOSE BLOOD TEST: CPT

## 2024-05-20 PROCEDURE — 84145 PROCALCITONIN (PCT): CPT

## 2024-05-20 PROCEDURE — 80053 COMPREHEN METABOLIC PANEL: CPT

## 2024-05-20 PROCEDURE — 87324 CLOSTRIDIUM AG IA: CPT

## 2024-05-20 PROCEDURE — 82140 ASSAY OF AMMONIA: CPT

## 2024-05-20 PROCEDURE — 36415 COLL VENOUS BLD VENIPUNCTURE: CPT

## 2024-05-20 PROCEDURE — 84100 ASSAY OF PHOSPHORUS: CPT

## 2024-05-20 PROCEDURE — 83735 ASSAY OF MAGNESIUM: CPT

## 2024-05-20 PROCEDURE — 86140 C-REACTIVE PROTEIN: CPT

## 2024-05-20 RX ORDER — SACCHAROMYCES BOULARDII 250 MG
250 POWDER IN PACKET (EA) ORAL
Refills: 0 | Status: DISCONTINUED | OUTPATIENT
Start: 2024-05-20 | End: 2024-05-22

## 2024-05-20 RX ORDER — INSULIN LISPRO 100/ML
2 VIAL (ML) SUBCUTANEOUS
Refills: 0 | Status: DISCONTINUED | OUTPATIENT
Start: 2024-05-20 | End: 2024-05-20

## 2024-05-20 RX ORDER — INSULIN GLARGINE 100 [IU]/ML
15 INJECTION, SOLUTION SUBCUTANEOUS AT BEDTIME
Refills: 0 | Status: DISCONTINUED | OUTPATIENT
Start: 2024-05-20 | End: 2024-05-22

## 2024-05-20 RX ORDER — MORPHINE SULFATE 50 MG/1
2 CAPSULE, EXTENDED RELEASE ORAL EVERY 4 HOURS
Refills: 0 | Status: DISCONTINUED | OUTPATIENT
Start: 2024-05-20 | End: 2024-05-21

## 2024-05-20 RX ADMIN — MORPHINE SULFATE 2 MILLIGRAM(S): 50 CAPSULE, EXTENDED RELEASE ORAL at 13:07

## 2024-05-20 RX ADMIN — ATORVASTATIN CALCIUM 40 MILLIGRAM(S): 80 TABLET, FILM COATED ORAL at 21:40

## 2024-05-20 RX ADMIN — Medication 1 TABLET(S): at 10:17

## 2024-05-20 RX ADMIN — BRIMONIDINE TARTRATE 1 DROP(S): 2 SOLUTION/ DROPS OPHTHALMIC at 10:16

## 2024-05-20 RX ADMIN — LOSARTAN POTASSIUM 25 MILLIGRAM(S): 100 TABLET, FILM COATED ORAL at 10:17

## 2024-05-20 RX ADMIN — PANTOPRAZOLE SODIUM 40 MILLIGRAM(S): 20 TABLET, DELAYED RELEASE ORAL at 21:40

## 2024-05-20 RX ADMIN — BRIMONIDINE TARTRATE 1 DROP(S): 2 SOLUTION/ DROPS OPHTHALMIC at 21:45

## 2024-05-20 RX ADMIN — Medication 1000 MILLIGRAM(S): at 17:16

## 2024-05-20 RX ADMIN — MORPHINE SULFATE 2 MILLIGRAM(S): 50 CAPSULE, EXTENDED RELEASE ORAL at 09:20

## 2024-05-20 RX ADMIN — MORPHINE SULFATE 2 MILLIGRAM(S): 50 CAPSULE, EXTENDED RELEASE ORAL at 13:37

## 2024-05-20 RX ADMIN — INSULIN GLARGINE 15 UNIT(S): 100 INJECTION, SOLUTION SUBCUTANEOUS at 21:45

## 2024-05-20 RX ADMIN — Medication 25 MILLIGRAM(S): at 10:18

## 2024-05-20 RX ADMIN — AMLODIPINE BESYLATE 10 MILLIGRAM(S): 2.5 TABLET ORAL at 10:16

## 2024-05-20 RX ADMIN — Medication 1000 MILLIGRAM(S): at 17:46

## 2024-05-20 RX ADMIN — Medication 250 MILLIGRAM(S): at 21:40

## 2024-05-20 RX ADMIN — MORPHINE SULFATE 2 MILLIGRAM(S): 50 CAPSULE, EXTENDED RELEASE ORAL at 08:50

## 2024-05-20 RX ADMIN — Medication 2 UNIT(S): at 13:30

## 2024-05-20 RX ADMIN — FAMOTIDINE 40 MILLIGRAM(S): 10 INJECTION INTRAVENOUS at 21:39

## 2024-05-20 RX ADMIN — ENOXAPARIN SODIUM 40 MILLIGRAM(S): 100 INJECTION SUBCUTANEOUS at 06:01

## 2024-05-20 RX ADMIN — PANTOPRAZOLE SODIUM 40 MILLIGRAM(S): 20 TABLET, DELAYED RELEASE ORAL at 10:17

## 2024-05-20 NOTE — PROGRESS NOTE ADULT - SUBJECTIVE AND OBJECTIVE BOX
HOSPITALIST ATTENDING PROGRESS NOTE    Chart and meds reviewed.  Patient seen and examined.    CC: Chest pain , abdominal pain    5/20 -  No acute issues overnight,  + diffuse abdominal pain with some  epigastric burning. No chest pain , reports now 3 lose BMs, tolerating clear fluids     All other systems reviewed and found to be negative with the exception of what has been described above.    Vital sings reviewed for last 24 h  T(C): 37 (05-20-24 @ 15:06), Max: 37 (05-20-24 @ 15:06)  T(F): 98.6 (05-20-24 @ 15:06), Max: 98.6 (05-20-24 @ 15:06)  HR: 60 (05-20-24 @ 15:06) (55 - 65)  BP: 115/69 (05-20-24 @ 15:06) (115/69 - 141/70)  RR: 18 (05-20-24 @ 15:06) (16 - 18)  SpO2: 100% (05-20-24 @ 15:06) (97% - 100%)  Wt(kg): --  Daily     Daily   CAPILLARY BLOOD GLUCOSE    A1C 9.6  POCT Blood Glucose.: 78 mg/dL (20 May 2024 17:22)  POCT Blood Glucose.: 143 mg/dL (20 May 2024 13:03)  POCT Blood Glucose.: 133 mg/dL (20 May 2024 08:31)  POCT Blood Glucose.: 157 mg/dL (19 May 2024 21:27)    Physical exam :   GEN: NAD  HEENT:  pupils equal and reactive, EOMI, no oropharyngeal lesions, erythema, exudates, oral thrush  NECK:   supple, no carotid bruits  CV:  +S1, +S2, regular, no murmurs  RESP:   lungs clear to auscultation bilaterally, no wheezing, rales, rhonchi, good air entry bilaterally  GI:  abdomen soft, + diffuse tenderness, non-distended, normal BS  EXT:  no clubbing, no cyanosis, no edema, no calf pain, swelling or erythema  NEURO:  AAOX3, no focal neurological deficits, follows all commands, able to move extremities spontaneously  SKIN:  no ulcers, lesions or rashes    LABS: all reviewed                           14.5   5.47  )-----------( 234      ( 20 May 2024 06:07 )             44.4     05-20    141  |  110<H>  |  10  ----------------------------<  145<H>  4.0   |  25  |  1.07    Ca    9.3      20 May 2024 06:07    TPro  6.5  /  Alb  2.8<L>  /  TBili  0.4  /  DBili  x   /  AST  18  /  ALT  29  /  AlkPhos  74  05-20        LIVER FUNCTIONS - ( 20 May 2024 06:07 )  Alb: 2.8 g/dL / Pro: 6.5 gm/dL / ALK PHOS: 74 U/L / ALT: 29 U/L / AST: 18 U/L / GGT: x             US Abdomen Upper Quadrant Right (05.18.24 @ 19:03) >  IMPRESSION:  Borderline common duct likely secondary to cholecystectomy      Xray Chest 2 Views PA/Lat (05.18.24 @ 17:15) >  IMPRESSION:  Unremarkable plain film examination of the chest    < from: CT Abdomen and Pelvis w/ Oral Cont and w/ IV Cont (05.20.24 @ 16:08) >  FINDINGS:  LOWER CHEST: Within normal limits.    LIVER: Within normal limits.  BILE DUCTS: Normal caliber.  GALLBLADDER: Cholecystectomy.  SPLEEN: Punctate calcified granuloma.  PANCREAS: No main pancreatic duct dilatation. Mid pancreatic body focal   fat/atrophy, unchanged.  ADRENALS: Within normal limits.  KIDNEYS/URETERS: Within normal limits.    BLADDER: Within normal limits.  REPRODUCTIVE ORGANS: Prostate is enlarged.    BOWEL: Small hiatal hernia. Diffuse mild colonic wall thickening with   mucosal hyperenhancement, compatible with pancolitis. No bowel   obstruction. Appendix is normal.  PERITONEUM: No ascites.  VESSELS: Within normal limits.  RETROPERITONEUM/LYMPH NODES: No lymphadenopathy.  ABDOMINAL WALL: Small fat-containing umbilical hernia. Mild rectus   diastasis. Small fat-containing left-inguinal hernia. Right gluteal   subcutaneous calcified granuloma.  BONES: Degenerative changes.    IMPRESSION:  Pancolitis.    < end of copied text >        MEDICATIONS  (STANDING):  acetaminophen     Tablet .. 1000 milliGRAM(s) Oral every 8 hours  amLODIPine   Tablet 10 milliGRAM(s) Oral daily  atorvastatin 40 milliGRAM(s) Oral at bedtime  brimonidine 0.2% Ophthalmic Solution 1 Drop(s) Both EYES two times a day  dextrose 10% Bolus 125 milliLiter(s) IV Bolus once  dextrose 5%. 1000 milliLiter(s) (50 mL/Hr) IV Continuous <Continuous>  dextrose 50% Injectable 25 Gram(s) IV Push once  enoxaparin Injectable 40 milliGRAM(s) SubCutaneous every 24 hours  famotidine    Tablet 40 milliGRAM(s) Oral at bedtime  glucagon  Injectable 1 milliGRAM(s) IntraMuscular once  insulin glargine Injectable (LANTUS) 15 Unit(s) SubCutaneous at bedtime  insulin lispro (ADMELOG) corrective regimen sliding scale   SubCutaneous three times a day before meals  losartan 25 milliGRAM(s) Oral daily  metoprolol succinate ER 25 milliGRAM(s) Oral daily  multivitamin/minerals 1 Tablet(s) Oral daily  naloxone Injectable 0.4 milliGRAM(s) IV Push once  pantoprazole  Injectable 40 milliGRAM(s) IV Push two times a day  polyethylene glycol 3350 17 Gram(s) Oral daily  saccharomyces boulardii 250 milliGRAM(s) Oral two times a day  senna 2 Tablet(s) Oral at bedtime    MEDICATIONS  (PRN):  aluminum hydroxide/magnesium hydroxide/simethicone Suspension 30 milliLiter(s) Oral every 4 hours PRN Dyspepsia  bisacodyl 5 milliGRAM(s) Oral daily PRN Constipation  dextrose Oral Gel 15 Gram(s) Oral once PRN Blood Glucose LESS THAN 70 milliGRAM(s)/deciliter  melatonin 3 milliGRAM(s) Oral at bedtime PRN Insomnia  morphine  - Injectable 2 milliGRAM(s) IV Push every 4 hours PRN Moderate Pain (4 - 6)  ondansetron Injectable 4 milliGRAM(s) IV Push once PRN Nausea and/or Vomiting

## 2024-05-21 LAB
ALBUMIN SERPL ELPH-MCNC: 2.9 G/DL — LOW (ref 3.3–5)
ALP SERPL-CCNC: 76 U/L — SIGNIFICANT CHANGE UP (ref 40–120)
ALT FLD-CCNC: 41 U/L — SIGNIFICANT CHANGE UP (ref 12–78)
AMMONIA BLD-MCNC: 39 UMOL/L — HIGH (ref 11–32)
AMYLASE P1 CFR SERPL: 81 U/L — SIGNIFICANT CHANGE UP (ref 25–115)
ANION GAP SERPL CALC-SCNC: 6 MMOL/L — SIGNIFICANT CHANGE UP (ref 5–17)
AST SERPL-CCNC: 31 U/L — SIGNIFICANT CHANGE UP (ref 15–37)
BASOPHILS # BLD AUTO: 0.04 K/UL — SIGNIFICANT CHANGE UP (ref 0–0.2)
BASOPHILS NFR BLD AUTO: 0.6 % — SIGNIFICANT CHANGE UP (ref 0–2)
BILIRUB SERPL-MCNC: 0.2 MG/DL — SIGNIFICANT CHANGE UP (ref 0.2–1.2)
BUN SERPL-MCNC: 11 MG/DL — SIGNIFICANT CHANGE UP (ref 7–23)
C DIFF GDH STL QL: NEGATIVE — SIGNIFICANT CHANGE UP
C DIFF GDH STL QL: SIGNIFICANT CHANGE UP
CALCIUM SERPL-MCNC: 8.9 MG/DL — SIGNIFICANT CHANGE UP (ref 8.5–10.1)
CHLORIDE SERPL-SCNC: 109 MMOL/L — HIGH (ref 96–108)
CK SERPL-CCNC: 134 U/L — SIGNIFICANT CHANGE UP (ref 26–308)
CO2 SERPL-SCNC: 25 MMOL/L — SIGNIFICANT CHANGE UP (ref 22–31)
CREAT SERPL-MCNC: 1.17 MG/DL — SIGNIFICANT CHANGE UP (ref 0.5–1.3)
CRP SERPL-MCNC: 7 MG/L — HIGH
EGFR: 65 ML/MIN/1.73M2 — SIGNIFICANT CHANGE UP
EOSINOPHIL # BLD AUTO: 0.22 K/UL — SIGNIFICANT CHANGE UP (ref 0–0.5)
EOSINOPHIL NFR BLD AUTO: 3.3 % — SIGNIFICANT CHANGE UP (ref 0–6)
GI PCR PANEL: SIGNIFICANT CHANGE UP
GLUCOSE BLDC GLUCOMTR-MCNC: 136 MG/DL — HIGH (ref 70–99)
GLUCOSE BLDC GLUCOMTR-MCNC: 167 MG/DL — HIGH (ref 70–99)
GLUCOSE BLDC GLUCOMTR-MCNC: 213 MG/DL — HIGH (ref 70–99)
GLUCOSE BLDC GLUCOMTR-MCNC: 284 MG/DL — HIGH (ref 70–99)
GLUCOSE SERPL-MCNC: 187 MG/DL — HIGH (ref 70–99)
HCT VFR BLD CALC: 42.7 % — SIGNIFICANT CHANGE UP (ref 39–50)
HGB BLD-MCNC: 14 G/DL — SIGNIFICANT CHANGE UP (ref 13–17)
IMM GRANULOCYTES NFR BLD AUTO: 0.2 % — SIGNIFICANT CHANGE UP (ref 0–0.9)
LIDOCAIN IGE QN: 151 U/L — HIGH (ref 13–75)
LYMPHOCYTES # BLD AUTO: 2.68 K/UL — SIGNIFICANT CHANGE UP (ref 1–3.3)
LYMPHOCYTES # BLD AUTO: 40.5 % — SIGNIFICANT CHANGE UP (ref 13–44)
MAGNESIUM SERPL-MCNC: 2 MG/DL — SIGNIFICANT CHANGE UP (ref 1.6–2.6)
MCHC RBC-ENTMCNC: 29 PG — SIGNIFICANT CHANGE UP (ref 27–34)
MCHC RBC-ENTMCNC: 32.8 GM/DL — SIGNIFICANT CHANGE UP (ref 32–36)
MCV RBC AUTO: 88.4 FL — SIGNIFICANT CHANGE UP (ref 80–100)
MONOCYTES # BLD AUTO: 0.66 K/UL — SIGNIFICANT CHANGE UP (ref 0–0.9)
MONOCYTES NFR BLD AUTO: 10 % — SIGNIFICANT CHANGE UP (ref 2–14)
NEUTROPHILS # BLD AUTO: 3 K/UL — SIGNIFICANT CHANGE UP (ref 1.8–7.4)
NEUTROPHILS NFR BLD AUTO: 45.4 % — SIGNIFICANT CHANGE UP (ref 43–77)
PHOSPHATE SERPL-MCNC: 2.2 MG/DL — LOW (ref 2.5–4.5)
PLATELET # BLD AUTO: 237 K/UL — SIGNIFICANT CHANGE UP (ref 150–400)
POTASSIUM SERPL-MCNC: 4.1 MMOL/L — SIGNIFICANT CHANGE UP (ref 3.5–5.3)
POTASSIUM SERPL-SCNC: 4.1 MMOL/L — SIGNIFICANT CHANGE UP (ref 3.5–5.3)
PROT SERPL-MCNC: 6.7 GM/DL — SIGNIFICANT CHANGE UP (ref 6–8.3)
RBC # BLD: 4.83 M/UL — SIGNIFICANT CHANGE UP (ref 4.2–5.8)
RBC # FLD: 12.9 % — SIGNIFICANT CHANGE UP (ref 10.3–14.5)
SODIUM SERPL-SCNC: 140 MMOL/L — SIGNIFICANT CHANGE UP (ref 135–145)
TSH SERPL-MCNC: 1.45 UU/ML — SIGNIFICANT CHANGE UP (ref 0.34–4.82)
WBC # BLD: 6.61 K/UL — SIGNIFICANT CHANGE UP (ref 3.8–10.5)
WBC # FLD AUTO: 6.61 K/UL — SIGNIFICANT CHANGE UP (ref 3.8–10.5)

## 2024-05-21 PROCEDURE — 99232 SBSQ HOSP IP/OBS MODERATE 35: CPT

## 2024-05-21 PROCEDURE — 99222 1ST HOSP IP/OBS MODERATE 55: CPT

## 2024-05-21 RX ORDER — CIPROFLOXACIN LACTATE 400MG/40ML
400 VIAL (ML) INTRAVENOUS EVERY 12 HOURS
Refills: 0 | Status: DISCONTINUED | OUTPATIENT
Start: 2024-05-22 | End: 2024-05-22

## 2024-05-21 RX ORDER — CIPROFLOXACIN LACTATE 400MG/40ML
400 VIAL (ML) INTRAVENOUS ONCE
Refills: 0 | Status: COMPLETED | OUTPATIENT
Start: 2024-05-21 | End: 2024-05-21

## 2024-05-21 RX ORDER — ROSUVASTATIN CALCIUM 5 MG/1
1 TABLET ORAL
Refills: 0 | DISCHARGE

## 2024-05-21 RX ORDER — DAPAGLIFLOZIN 10 MG/1
1 TABLET, FILM COATED ORAL
Refills: 0 | DISCHARGE

## 2024-05-21 RX ORDER — AMLODIPINE BESYLATE 2.5 MG/1
1 TABLET ORAL
Refills: 0 | DISCHARGE

## 2024-05-21 RX ORDER — METFORMIN HYDROCHLORIDE 850 MG/1
2 TABLET ORAL
Refills: 0 | DISCHARGE

## 2024-05-21 RX ORDER — METRONIDAZOLE 500 MG
TABLET ORAL
Refills: 0 | Status: DISCONTINUED | OUTPATIENT
Start: 2024-05-21 | End: 2024-05-22

## 2024-05-21 RX ORDER — INSULIN ASPART 100 [IU]/ML
10 INJECTION, SOLUTION SUBCUTANEOUS
Refills: 0 | DISCHARGE

## 2024-05-21 RX ORDER — BRIMONIDINE TARTRATE 2 MG/MG
1 SOLUTION/ DROPS OPHTHALMIC
Refills: 0 | DISCHARGE

## 2024-05-21 RX ORDER — METRONIDAZOLE 500 MG
500 TABLET ORAL EVERY 8 HOURS
Refills: 0 | Status: DISCONTINUED | OUTPATIENT
Start: 2024-05-22 | End: 2024-05-22

## 2024-05-21 RX ORDER — METRONIDAZOLE 500 MG
500 TABLET ORAL ONCE
Refills: 0 | Status: COMPLETED | OUTPATIENT
Start: 2024-05-21 | End: 2024-05-21

## 2024-05-21 RX ORDER — OXYCODONE HYDROCHLORIDE 5 MG/1
5 TABLET ORAL EVERY 4 HOURS
Refills: 0 | Status: DISCONTINUED | OUTPATIENT
Start: 2024-05-21 | End: 2024-05-22

## 2024-05-21 RX ORDER — CIPROFLOXACIN LACTATE 400MG/40ML
VIAL (ML) INTRAVENOUS
Refills: 0 | Status: DISCONTINUED | OUTPATIENT
Start: 2024-05-21 | End: 2024-05-22

## 2024-05-21 RX ADMIN — Medication 250 MILLIGRAM(S): at 22:05

## 2024-05-21 RX ADMIN — OXYCODONE HYDROCHLORIDE 5 MILLIGRAM(S): 5 TABLET ORAL at 22:03

## 2024-05-21 RX ADMIN — Medication 1000 MILLIGRAM(S): at 10:23

## 2024-05-21 RX ADMIN — Medication 30 MILLILITER(S): at 15:32

## 2024-05-21 RX ADMIN — ATORVASTATIN CALCIUM 40 MILLIGRAM(S): 80 TABLET, FILM COATED ORAL at 22:04

## 2024-05-21 RX ADMIN — Medication 250 MILLIGRAM(S): at 09:55

## 2024-05-21 RX ADMIN — Medication 1000 MILLIGRAM(S): at 18:55

## 2024-05-21 RX ADMIN — FAMOTIDINE 40 MILLIGRAM(S): 10 INJECTION INTRAVENOUS at 22:04

## 2024-05-21 RX ADMIN — Medication 30 MILLILITER(S): at 22:09

## 2024-05-21 RX ADMIN — Medication 100 MILLIGRAM(S): at 22:16

## 2024-05-21 RX ADMIN — OXYCODONE HYDROCHLORIDE 5 MILLIGRAM(S): 5 TABLET ORAL at 22:33

## 2024-05-21 RX ADMIN — INSULIN GLARGINE 15 UNIT(S): 100 INJECTION, SOLUTION SUBCUTANEOUS at 22:12

## 2024-05-21 RX ADMIN — Medication 1 TABLET(S): at 09:53

## 2024-05-21 RX ADMIN — AMLODIPINE BESYLATE 10 MILLIGRAM(S): 2.5 TABLET ORAL at 09:53

## 2024-05-21 RX ADMIN — BRIMONIDINE TARTRATE 1 DROP(S): 2 SOLUTION/ DROPS OPHTHALMIC at 09:57

## 2024-05-21 RX ADMIN — Medication 25 MILLIGRAM(S): at 09:55

## 2024-05-21 RX ADMIN — Medication 30 MILLILITER(S): at 05:15

## 2024-05-21 RX ADMIN — Medication 6: at 12:49

## 2024-05-21 RX ADMIN — Medication 1000 MILLIGRAM(S): at 09:53

## 2024-05-21 RX ADMIN — BRIMONIDINE TARTRATE 1 DROP(S): 2 SOLUTION/ DROPS OPHTHALMIC at 22:04

## 2024-05-21 RX ADMIN — PANTOPRAZOLE SODIUM 40 MILLIGRAM(S): 20 TABLET, DELAYED RELEASE ORAL at 09:56

## 2024-05-21 RX ADMIN — Medication 1000 MILLIGRAM(S): at 23:33

## 2024-05-21 RX ADMIN — Medication 2: at 09:51

## 2024-05-21 RX ADMIN — LOSARTAN POTASSIUM 25 MILLIGRAM(S): 100 TABLET, FILM COATED ORAL at 09:54

## 2024-05-21 RX ADMIN — Medication 200 MILLIGRAM(S): at 23:34

## 2024-05-21 RX ADMIN — PANTOPRAZOLE SODIUM 40 MILLIGRAM(S): 20 TABLET, DELAYED RELEASE ORAL at 22:05

## 2024-05-21 RX ADMIN — ENOXAPARIN SODIUM 40 MILLIGRAM(S): 100 INJECTION SUBCUTANEOUS at 05:15

## 2024-05-21 RX ADMIN — Medication 3 MILLIGRAM(S): at 23:35

## 2024-05-21 NOTE — PROGRESS NOTE ADULT - SUBJECTIVE AND OBJECTIVE BOX
HOSPITALIST ATTENDING PROGRESS NOTE    Chart and meds reviewed.  Patient seen and examined.    CC: Chest pain , abdominal pain    5/20 -  No acute issues overnight,  + diffuse abdominal pain with some  epigastric burning. No chest pain , reports now 3 lose BMs, tolerating clear fluids   5/21 - + abdominal pain 6/10 , + lose stools, stool studies pending, tolerating po intake, afebrile    All other systems reviewed and found to be negative with the exception of what has been described above.    Vital sings reviewed for last 24 h  T(C): 36.9 (05-21-24 @ 16:01), Max: 36.9 (05-21-24 @ 16:01)  T(F): 98.4 (05-21-24 @ 16:01), Max: 98.4 (05-21-24 @ 16:01)  HR: 66 (05-21-24 @ 16:01) (65 - 68)  BP: 133/65 (05-21-24 @ 16:01) (124/83 - 134/69)  RR: 18 (05-21-24 @ 16:01) (16 - 18)  SpO2: 100% (05-21-24 @ 16:01) (100% - 100%)  Wt(kg): --  Daily     Daily   CAPILLARY BLOOD GLUCOSE      POCT Blood Glucose.: 136 mg/dL (21 May 2024 16:44)  POCT Blood Glucose.: 284 mg/dL (21 May 2024 12:16)  POCT Blood Glucose.: 167 mg/dL (21 May 2024 09:24)  POCT Blood Glucose.: 250 mg/dL (20 May 2024 21:44)      Physical exam :   GEN: NAD  HEENT:  pupils equal and reactive, EOMI, no oropharyngeal lesions, erythema, exudates, oral thrush  NECK:   supple, no carotid bruits  CV:  +S1, +S2, regular, no murmurs  RESP:   lungs clear to auscultation bilaterally, no wheezing, rales, rhonchi, good air entry bilaterally  GI:  abdomen soft, + diffuse tenderness, non-distended, normal BS  EXT:  no clubbing, no cyanosis, no edema, no calf pain, swelling or erythema  NEURO:  AAOX3, no focal neurological deficits, follows all commands, able to move extremities spontaneously  SKIN:  no ulcers, lesions or rashes    LABS: all reviewed                           14.0   6.61  )-----------( 237      ( 21 May 2024 06:47 )             42.7     05-21    140  |  109<H>  |  11  ----------------------------<  187<H>  4.1   |  25  |  1.17    Ca    8.9      21 May 2024 06:47  Phos  2.2     05-21  Mg     2.0     05-21    TPro  6.7  /  Alb  2.9<L>  /  TBili  0.2  /  DBili  x   /  AST  31  /  ALT  41  /  AlkPhos  76  05-21    CARDIAC MARKERS ( 21 May 2024 06:47 )  x     / x     / 134 U/L / x     / x          LIVER FUNCTIONS - ( 21 May 2024 06:47 )  Alb: 2.9 g/dL / Pro: 6.7 gm/dL / ALK PHOS: 76 U/L / ALT: 41 U/L / AST: 31 U/L / GGT: x               US Abdomen Upper Quadrant Right (05.18.24 @ 19:03) >  IMPRESSION:  Borderline common duct likely secondary to cholecystectomy      Xray Chest 2 Views PA/Lat (05.18.24 @ 17:15) >  IMPRESSION:  Unremarkable plain film examination of the chest    < from: CT Abdomen and Pelvis w/ Oral Cont and w/ IV Cont (05.20.24 @ 16:08) >  FINDINGS:  LOWER CHEST: Within normal limits.    LIVER: Within normal limits.  BILE DUCTS: Normal caliber.  GALLBLADDER: Cholecystectomy.  SPLEEN: Punctate calcified granuloma.  PANCREAS: No main pancreatic duct dilatation. Mid pancreatic body focal   fat/atrophy, unchanged.  ADRENALS: Within normal limits.  KIDNEYS/URETERS: Within normal limits.    BLADDER: Within normal limits.  REPRODUCTIVE ORGANS: Prostate is enlarged.    BOWEL: Small hiatal hernia. Diffuse mild colonic wall thickening with   mucosal hyperenhancement, compatible with pancolitis. No bowel   obstruction. Appendix is normal.  PERITONEUM: No ascites.  VESSELS: Within normal limits.  RETROPERITONEUM/LYMPH NODES: No lymphadenopathy.  ABDOMINAL WALL: Small fat-containing umbilical hernia. Mild rectus   diastasis. Small fat-containing left-inguinal hernia. Right gluteal   subcutaneous calcified granuloma.  BONES: Degenerative changes.    IMPRESSION:  Pancolitis.    < end of copied text >        MEDICATIONS  (STANDING):  acetaminophen     Tablet .. 1000 milliGRAM(s) Oral every 8 hours  amLODIPine   Tablet 10 milliGRAM(s) Oral daily  atorvastatin 40 milliGRAM(s) Oral at bedtime  brimonidine 0.2% Ophthalmic Solution 1 Drop(s) Both EYES two times a day  dextrose 10% Bolus 125 milliLiter(s) IV Bolus once  dextrose 5%. 1000 milliLiter(s) (50 mL/Hr) IV Continuous <Continuous>  dextrose 50% Injectable 25 Gram(s) IV Push once  enoxaparin Injectable 40 milliGRAM(s) SubCutaneous every 24 hours  famotidine    Tablet 40 milliGRAM(s) Oral at bedtime  glucagon  Injectable 1 milliGRAM(s) IntraMuscular once  insulin glargine Injectable (LANTUS) 15 Unit(s) SubCutaneous at bedtime  insulin lispro (ADMELOG) corrective regimen sliding scale   SubCutaneous three times a day before meals  losartan 25 milliGRAM(s) Oral daily  metoprolol succinate ER 25 milliGRAM(s) Oral daily  multivitamin/minerals 1 Tablet(s) Oral daily  naloxone Injectable 0.4 milliGRAM(s) IV Push once  pantoprazole  Injectable 40 milliGRAM(s) IV Push two times a day  polyethylene glycol 3350 17 Gram(s) Oral daily  saccharomyces boulardii 250 milliGRAM(s) Oral two times a day  senna 2 Tablet(s) Oral at bedtime    MEDICATIONS  (PRN):  aluminum hydroxide/magnesium hydroxide/simethicone Suspension 30 milliLiter(s) Oral every 4 hours PRN Dyspepsia  bisacodyl 5 milliGRAM(s) Oral daily PRN Constipation  dextrose Oral Gel 15 Gram(s) Oral once PRN Blood Glucose LESS THAN 70 milliGRAM(s)/deciliter  melatonin 3 milliGRAM(s) Oral at bedtime PRN Insomnia  morphine  - Injectable 2 milliGRAM(s) IV Push every 4 hours PRN Moderate Pain (4 - 6)  ondansetron Injectable 4 milliGRAM(s) IV Push once PRN Nausea and/or Vomiting

## 2024-05-21 NOTE — CONSULT NOTE ADULT - NS ATTEND AMEND GEN_ALL_CORE FT
73yo male with colitis    ?infectious  empiric abx, await stool studies  ?colonoscopy in several weeks as outpt

## 2024-05-21 NOTE — CONSULT NOTE ADULT - SUBJECTIVE AND OBJECTIVE BOX
Patient is a 74y old  Male who presents with a chief complaint of lower chest pain and loose stools    HPI:  This is a 74 year old male with significant past medical history of recurrent pancreatitis, DM2 on Metformin/Lantus , HTN, cholecystectomy 10 yrs ago presenting with lower chest pain x 3 days. Negative cardiac workup in ED with discharge to home. Presented agin with inability to tolerate PO and continued RUQ & epigastric pain. On CT imaging pancolitis. No evidence of GIB prompting GI consultation and evaluation.  Patient evaluated at bedside this morning after breakfast. Able to eat/tolerate 100% of breakfast. Does endorse multiple nonbloody loose stools and some mild upper abdominal tenderness. Stools pending for GIPCR and Cdiff. Patient denies recent abx, travel, ill contacts, or consumption raw/undercooked foods. Last colonoscopy a few year ago, per patient unremarkable. Follows with Dr. Joe Mann.     PAST MEDICAL & SURGICAL HISTORY:  Pancreatitis      HTN (hypertension)      DM (diabetes mellitus)      S/P laparoscopic cholecystectomy          MEDICATIONS  (STANDING):  acetaminophen     Tablet .. 1000 milliGRAM(s) Oral every 8 hours  amLODIPine   Tablet 10 milliGRAM(s) Oral daily  atorvastatin 40 milliGRAM(s) Oral at bedtime  brimonidine 0.2% Ophthalmic Solution 1 Drop(s) Both EYES two times a day  dextrose 10% Bolus 125 milliLiter(s) IV Bolus once  dextrose 5%. 1000 milliLiter(s) (50 mL/Hr) IV Continuous <Continuous>  dextrose 50% Injectable 25 Gram(s) IV Push once  enoxaparin Injectable 40 milliGRAM(s) SubCutaneous every 24 hours  famotidine    Tablet 40 milliGRAM(s) Oral at bedtime  glucagon  Injectable 1 milliGRAM(s) IntraMuscular once  insulin glargine Injectable (LANTUS) 15 Unit(s) SubCutaneous at bedtime  insulin lispro (ADMELOG) corrective regimen sliding scale   SubCutaneous three times a day before meals  losartan 25 milliGRAM(s) Oral daily  metoprolol succinate ER 25 milliGRAM(s) Oral daily  multivitamin/minerals 1 Tablet(s) Oral daily  naloxone Injectable 0.4 milliGRAM(s) IV Push once  pantoprazole  Injectable 40 milliGRAM(s) IV Push two times a day  saccharomyces boulardii 250 milliGRAM(s) Oral two times a day    MEDICATIONS  (PRN):  aluminum hydroxide/magnesium hydroxide/simethicone Suspension 30 milliLiter(s) Oral every 4 hours PRN Dyspepsia  bisacodyl 5 milliGRAM(s) Oral daily PRN Constipation  dextrose Oral Gel 15 Gram(s) Oral once PRN Blood Glucose LESS THAN 70 milliGRAM(s)/deciliter  melatonin 3 milliGRAM(s) Oral at bedtime PRN Insomnia  ondansetron Injectable 4 milliGRAM(s) IV Push once PRN Nausea and/or Vomiting  oxyCODONE    IR 5 milliGRAM(s) Oral every 4 hours PRN Moderate Pain (4 - 6)      Allergies    No Known Allergies    Intolerances        SOCIAL HISTORY:    FAMILY HISTORY:  Family history of diabetes mellitus in brother    Family history of cancer in mother        REVIEW OF SYSTEMS:    CONSTITUTIONAL: No weakness, fevers or chills  EYES/ENT: No visual changes;  No vertigo or throat pain   NECK: No pain or stiffness  RESPIRATORY: No cough, wheezing, hemoptysis; No shortness of breath  CARDIOVASCULAR: No chest pain or palpitations  GASTROINTESTINAL: No abdominal or epigastric pain. No nausea, vomiting, or hematemesis; No diarrhea or constipation. No melena or hematochezia.  GENITOURINARY: No dysuria, frequency or hematuria  NEUROLOGICAL: No numbness or weakness  SKIN: No itching, burning, rashes, or lesions   PSYCH: Normal mood and affect  All other review of systems is negative unless indicated above.    Vital Signs Last 24 Hrs  T(C): 36.9 (21 May 2024 16:01), Max: 36.9 (21 May 2024 16:01)  T(F): 98.4 (21 May 2024 16:01), Max: 98.4 (21 May 2024 16:01)  HR: 66 (21 May 2024 16:01) (65 - 68)  BP: 133/65 (21 May 2024 16:01) (124/83 - 134/69)  BP(mean): --  RR: 18 (21 May 2024 16:01) (16 - 18)  SpO2: 100% (21 May 2024 16:01) (100% - 100%)    Parameters below as of 21 May 2024 16:01  Patient On (Oxygen Delivery Method): room air        PHYSICAL EXAM:    Constitutional: No acute distress, well-developed, non-toxic appearing  HEENT: masked, good phonation, not icteric  Neck: supple, no lymphadenopathy  Respiratory: clear to ascultation bilaterally, no wheezing  Cardiovascular: S1 and S2, regular rate and rhythm, no murmurs rubs or gallops  Gastrointestinal: soft, non-tender, non-distended, +bowel sounds, no rebound or guarding, no surgical scars, no drains  Extremities: No peripheral edema, no cyanosis or clubbing  Vascular: 2+ peripheral pulses, no venous stasis  Neurological: A/O x 3, no focal deficits, no asterixis  Psychiatric: Normal mood, normal affect  Skin: No rashes, not jaundiced    LABS:                        14.0   6.61  )-----------( 237      ( 21 May 2024 06:47 )             42.7     05-21    140  |  109<H>  |  11  ----------------------------<  187<H>  4.1   |  25  |  1.17    Ca    8.9      21 May 2024 06:47  Phos  2.2     05-21  Mg     2.0     05-21    TPro  6.7  /  Alb  2.9<L>  /  TBili  0.2  /  DBili  x   /  AST  31  /  ALT  41  /  AlkPhos  76  05-21      LIVER FUNCTIONS - ( 21 May 2024 06:47 )  Alb: 2.9 g/dL / Pro: 6.7 gm/dL / ALK PHOS: 76 U/L / ALT: 41 U/L / AST: 31 U/L / GGT: x             RADIOLOGY & ADDITIONAL STUDIES:  FINDINGS:  LOWER CHEST: Within normal limits.    LIVER: Within normal limits.  BILE DUCTS: Normal caliber.  GALLBLADDER: Cholecystectomy.  SPLEEN: Punctate calcified granuloma.  PANCREAS: No main pancreatic duct dilatation. Mid pancreatic body focal   fat/atrophy, unchanged.  ADRENALS: Within normal limits.  KIDNEYS/URETERS: Within normal limits.    BLADDER: Within normal limits.  REPRODUCTIVE ORGANS: Prostate is enlarged.    BOWEL: Small hiatal hernia. Diffuse mild colonic wall thickening with   mucosal hyperenhancement, compatible with pancolitis. No bowel   obstruction. Appendix is normal.  PERITONEUM: No ascites.  VESSELS: Within normal limits.  RETROPERITONEUM/LYMPH NODES: No lymphadenopathy.  ABDOMINAL WALL: Small fat-containing umbilical hernia. Mild rectus   diastasis. Small fat-containing left-inguinal hernia. Right gluteal   subcutaneous calcified granuloma.  BONES: Degenerative changes.    IMPRESSION:  Pancolitis.

## 2024-05-21 NOTE — CONSULT NOTE ADULT - ASSESSMENT
74 year old male with history HTN, DM, recurrent pancreatitis presenting with loose nonbloody stools and epigastric pain with mildly elevated lipase, no evidence of pancreatitis on imaging, and pancolitis on imaging. No leukocytosis. Stable HH, no anemia.    Imp: Pancolitis 2/2 likely infectious etiology    Clinically still with loose stools, but improved as able to tolerate 100% of breakfast.    Rec:  ::Cdiff/GIPCR  ::Low residue diet  ::Pending cultures, may start empiric IV abx cipro/flagyl

## 2024-05-21 NOTE — CONSULT NOTE ADULT - REASON FOR ADMISSION
Chest pain x3 days  Epigastric pain, RUQ Ab pain  x 3 days The patient is a 22y Female complaining of indigestion.

## 2024-05-21 NOTE — PROGRESS NOTE ADULT - REASON FOR ADMISSION
Chest pain x3 days  Epigastric pain, RUQ Ab pain  x 3 days

## 2024-05-21 NOTE — PROGRESS NOTE ADULT - ASSESSMENT
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 Took Tylenol without relief. Denies SOB.. He has RUQ pain 6/10 radiating to epigastrium, with no previous Hx of PUD.    # Epigastric pain, likely reflux esophagitis  - EKG no acute change  - Troponin negative x 2  - Tele monitor- PVCs  - CT abd neg  - US neg for acute findings  - Start IV protonix  - Continue pepcid  - CLD ADAT  - Lipase elevated but not significant  - FU echo    # HTN  - Continue amlodipine  - Continue Metoprolol  - Continue losartan    # DM  - Lantus/PM/SS    # HLD  - Atorvastatin    # DVT prophylaxis  - Lovenox     # Disposition: FU echo, if improved DC home on protonix with FU outpatient GI for endo.     
 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 Took Tylenol without relief. Denies SOB.. He has RUQ pain 6/10 radiating to epigastrium, with no previous Hx of PUD.    # Epigastric pain, likely reflux esophagitis  # Diffuse abdominal pain some lose stool possible pancolitis   - EKG no acute change,  Troponin negative x 2  - Tele monitor- PVCs,  CT abd neg on 5/17 , repeat CT abd 5/20 - pancolitis   - US abd - 9 mm CBD s/p cholecystectomy   - c/w V protonix and pepcid  - advance diet  - GI consult   - collect stool studies to r/o infectious causes   - Lipase elevated but not significant   - FU echo EF 65 % no significant valvular disease     # HTN  - Continue amlodipine  - Continue Metoprolol  - Continue losartan    # DM 2 with A1C 9.6  - Lantus 20--> 15   - ISS  - episode of hypoglycemia , stop pre-meal insulin    # HLD  - Atorvastatin    # DVT prophylaxis  - Lovenox     # Disposition:  GI consult pending     
 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 Took Tylenol without relief. Denies SOB.. He has RUQ pain 6/10 radiating to epigastrium, with no previous Hx of PUD.    # Epigastric pain, likely reflux esophagitis  # Diffuse abdominal pain some lose stool possible pancolitis   - EKG no acute change,  Troponin negative x 2, Echo - EF wnl  - Tele monitor- PVCs,  CT abd neg on 5/17 , repeat CT abd 5/20 - pancolitis   - US abd - 9 mm CBD s/p cholecystectomy   - c/w V protonix and pepcid  - 5/21 - start cipro, flagyl   - stool studies - pending  - advance diet  - GI consult   - collect stool studies to r/o infectious causes   - Lipase elevated but not significant   - FU echo EF 65 % no significant valvular disease     # HTN  - Continue amlodipine  - Continue Metoprolol  - Continue losartan    # DM 2 with A1C 9.6  - Lantus 20--> 15   - ISS  - episode of hypoglycemia , stop pre-meal insulin    # HLD  - Atorvastatin    # DVT prophylaxis  - Lovenox     # Disposition:  GI consult, IV abx, stool studies

## 2024-05-22 ENCOUNTER — TRANSCRIPTION ENCOUNTER (OUTPATIENT)
Age: 74
End: 2024-05-22

## 2024-05-22 VITALS
RESPIRATION RATE: 17 BRPM | TEMPERATURE: 98 F | DIASTOLIC BLOOD PRESSURE: 65 MMHG | HEART RATE: 69 BPM | OXYGEN SATURATION: 100 % | SYSTOLIC BLOOD PRESSURE: 139 MMHG

## 2024-05-22 LAB
ALBUMIN SERPL ELPH-MCNC: 3 G/DL — LOW (ref 3.3–5)
ALP SERPL-CCNC: 78 U/L — SIGNIFICANT CHANGE UP (ref 40–120)
ALT FLD-CCNC: 50 U/L — SIGNIFICANT CHANGE UP (ref 12–78)
ANION GAP SERPL CALC-SCNC: 3 MMOL/L — LOW (ref 5–17)
AST SERPL-CCNC: 30 U/L — SIGNIFICANT CHANGE UP (ref 15–37)
BILIRUB SERPL-MCNC: 0.3 MG/DL — SIGNIFICANT CHANGE UP (ref 0.2–1.2)
BUN SERPL-MCNC: 13 MG/DL — SIGNIFICANT CHANGE UP (ref 7–23)
CALCIUM SERPL-MCNC: 8.9 MG/DL — SIGNIFICANT CHANGE UP (ref 8.5–10.1)
CHLORIDE SERPL-SCNC: 110 MMOL/L — HIGH (ref 96–108)
CO2 SERPL-SCNC: 26 MMOL/L — SIGNIFICANT CHANGE UP (ref 22–31)
CREAT SERPL-MCNC: 1 MG/DL — SIGNIFICANT CHANGE UP (ref 0.5–1.3)
CRP SERPL-MCNC: 3 MG/L — SIGNIFICANT CHANGE UP
EGFR: 78 ML/MIN/1.73M2 — SIGNIFICANT CHANGE UP
GLUCOSE BLDC GLUCOMTR-MCNC: 213 MG/DL — HIGH (ref 70–99)
GLUCOSE BLDC GLUCOMTR-MCNC: 257 MG/DL — HIGH (ref 70–99)
GLUCOSE SERPL-MCNC: 189 MG/DL — HIGH (ref 70–99)
MAGNESIUM SERPL-MCNC: 1.7 MG/DL — SIGNIFICANT CHANGE UP (ref 1.6–2.6)
PHOSPHATE SERPL-MCNC: 2.2 MG/DL — LOW (ref 2.5–4.5)
POTASSIUM SERPL-MCNC: 3.9 MMOL/L — SIGNIFICANT CHANGE UP (ref 3.5–5.3)
POTASSIUM SERPL-SCNC: 3.9 MMOL/L — SIGNIFICANT CHANGE UP (ref 3.5–5.3)
PROCALCITONIN SERPL-MCNC: 0.05 NG/ML — SIGNIFICANT CHANGE UP (ref 0.02–0.1)
PROT SERPL-MCNC: 6.7 GM/DL — SIGNIFICANT CHANGE UP (ref 6–8.3)
SODIUM SERPL-SCNC: 139 MMOL/L — SIGNIFICANT CHANGE UP (ref 135–145)

## 2024-05-22 PROCEDURE — 99239 HOSP IP/OBS DSCHRG MGMT >30: CPT

## 2024-05-22 RX ORDER — INSULIN GLARGINE 100 [IU]/ML
30 INJECTION, SOLUTION SUBCUTANEOUS
Qty: 0 | Refills: 0 | DISCHARGE

## 2024-05-22 RX ORDER — OXYCODONE HYDROCHLORIDE 5 MG/1
1 TABLET ORAL
Qty: 20 | Refills: 0
Start: 2024-05-22 | End: 2024-05-26

## 2024-05-22 RX ORDER — METRONIDAZOLE 500 MG
1 TABLET ORAL
Qty: 18 | Refills: 0
Start: 2024-05-22 | End: 2024-05-27

## 2024-05-22 RX ORDER — SACCHAROMYCES BOULARDII 250 MG
1 POWDER IN PACKET (EA) ORAL
Qty: 60 | Refills: 0
Start: 2024-05-22 | End: 2024-06-20

## 2024-05-22 RX ORDER — ZALEPLON 10 MG
5 CAPSULE ORAL ONCE
Refills: 0 | Status: DISCONTINUED | OUTPATIENT
Start: 2024-05-22 | End: 2024-05-22

## 2024-05-22 RX ORDER — LOSARTAN POTASSIUM 100 MG/1
1 TABLET, FILM COATED ORAL
Qty: 30 | Refills: 0
Start: 2024-05-22 | End: 2024-06-20

## 2024-05-22 RX ORDER — FAMOTIDINE 10 MG/ML
1 INJECTION INTRAVENOUS
Qty: 30 | Refills: 0
Start: 2024-05-22 | End: 2024-06-20

## 2024-05-22 RX ORDER — CIPROFLOXACIN LACTATE 400MG/40ML
1 VIAL (ML) INTRAVENOUS
Qty: 12 | Refills: 0
Start: 2024-05-22 | End: 2024-05-27

## 2024-05-22 RX ORDER — ACETAMINOPHEN 500 MG
2 TABLET ORAL
Qty: 60 | Refills: 0
Start: 2024-05-22 | End: 2024-05-31

## 2024-05-22 RX ORDER — METOPROLOL TARTRATE 50 MG
1 TABLET ORAL
Qty: 30 | Refills: 0
Start: 2024-05-22 | End: 2024-06-20

## 2024-05-22 RX ORDER — INSULIN GLARGINE 100 [IU]/ML
15 INJECTION, SOLUTION SUBCUTANEOUS
Qty: 0 | Refills: 0 | DISCHARGE
Start: 2024-05-22

## 2024-05-22 RX ORDER — PANTOPRAZOLE SODIUM 20 MG/1
1 TABLET, DELAYED RELEASE ORAL
Qty: 30 | Refills: 0
Start: 2024-05-22 | End: 2024-06-20

## 2024-05-22 RX ADMIN — AMLODIPINE BESYLATE 10 MILLIGRAM(S): 2.5 TABLET ORAL at 09:42

## 2024-05-22 RX ADMIN — PANTOPRAZOLE SODIUM 40 MILLIGRAM(S): 20 TABLET, DELAYED RELEASE ORAL at 09:42

## 2024-05-22 RX ADMIN — Medication 1000 MILLIGRAM(S): at 00:03

## 2024-05-22 RX ADMIN — Medication 5 MILLIGRAM(S): at 01:25

## 2024-05-22 RX ADMIN — LOSARTAN POTASSIUM 25 MILLIGRAM(S): 100 TABLET, FILM COATED ORAL at 09:41

## 2024-05-22 RX ADMIN — Medication 100 MILLIGRAM(S): at 06:22

## 2024-05-22 RX ADMIN — Medication 250 MILLIGRAM(S): at 09:42

## 2024-05-22 RX ADMIN — Medication 6: at 13:03

## 2024-05-22 RX ADMIN — Medication 200 MILLIGRAM(S): at 07:32

## 2024-05-22 RX ADMIN — ENOXAPARIN SODIUM 40 MILLIGRAM(S): 100 INJECTION SUBCUTANEOUS at 06:22

## 2024-05-22 RX ADMIN — Medication 4: at 08:53

## 2024-05-22 RX ADMIN — Medication 100 MILLIGRAM(S): at 13:04

## 2024-05-22 RX ADMIN — Medication 1000 MILLIGRAM(S): at 09:41

## 2024-05-22 RX ADMIN — BRIMONIDINE TARTRATE 1 DROP(S): 2 SOLUTION/ DROPS OPHTHALMIC at 09:42

## 2024-05-22 RX ADMIN — Medication 1 TABLET(S): at 09:42

## 2024-05-22 RX ADMIN — Medication 25 MILLIGRAM(S): at 09:42

## 2024-05-22 NOTE — DISCHARGE NOTE PROVIDER - NSDCCPCAREPLAN_GEN_ALL_CORE_FT
PRINCIPAL DISCHARGE DIAGNOSIS  Diagnosis: Pancolitis  Assessment and Plan of Treatment: complete 6 more days of antibiotics, follow up with PCP and GI specialist within 1 week for further care      SECONDARY DISCHARGE DIAGNOSES  Diagnosis: Pancreatitis  Assessment and Plan of Treatment: follow low fat diet, take pain medications, drink plent of fluids , follow up with GI specialist within 1 week for further monitoring of pancreas    Diagnosis: DM2 (diabetes mellitus, type 2)  Assessment and Plan of Treatment: continue home insulin and medications as recomended   follow up with your PCP for other preventive care within 1 week    Diagnosis: HTN (hypertension)  Assessment and Plan of Treatment: continue home  medications    Diagnosis: HLD (hyperlipidemia)  Assessment and Plan of Treatment: continue home medications

## 2024-05-22 NOTE — DISCHARGE NOTE PROVIDER - HOSPITAL COURSE
Chart and meds reviewed.  Patient seen and examined.    CC: Chest pain , abdominal pain    5/20 -  No acute issues overnight,  + diffuse abdominal pain with some  epigastric burning. No chest pain , reports now 3 lose BMs, tolerating clear fluids   5/21 - + abdominal pain 6/10 , + lose stools, stool studies pending, tolerating po intake, afebrile  5/22 - abdominal pain better, diarrhea stopped once on IV abx, denies nausea, vomiting , tolerating po intake  All other systems reviewed and found to be negative with the exception of what has been described above.  Vital Signs reviewed in  Last 24 Hrs  T(C): 36.5 (22 May 2024 09:26), Max: 36.9 (21 May 2024 16:01)  T(F): 97.7 (22 May 2024 09:26), Max: 98.4 (21 May 2024 16:01)  HR: 63 (22 May 2024 09:26) (61 - 66)  BP: 133/68 (22 May 2024 09:26) (130/72 - 136/76)  RR: 16 (22 May 2024 09:26) (16 - 18)  SpO2: 98% (22 May 2024 09:26) (95% - 100%)  O2 Parameters below as of 22 May 2024 09:26  Patient On (Oxygen Delivery Method): room air    Physical exam :   GEN: NAD  HEENT:  pupils equal and reactive, EOMI, no oropharyngeal lesions, erythema, exudates, oral thrush  NECK:   supple, no carotid bruits  CV:  +S1, +S2, regular, no murmurs  RESP:   lungs clear to auscultation bilaterally, no wheezing, rales, rhonchi, good air entry bilaterally  GI:  abdomen soft, + diffuse tenderness, non-distended, normal BS  EXT:  no clubbing, no cyanosis, no edema, no calf pain, swelling or erythema  NEURO:  AAOX3, no focal neurological deficits, follows all commands, able to move extremities spontaneously  SKIN:  no ulcers, lesions or rashes    LABS: all reviewed     · Assessment     75 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 Took Tylenol without relief. Denies SOB.. He has RUQ pain 6/10 radiating to epigastrium, with no previous Hx of PUD.    # Epigastric pain, likely reflux esophagitis , probable mild pancreatitis  # Diffuse abdominal pain some lose stool possible pancolitis   - EKG no acute change,  Troponin negative x 2, Echo - EF wnl  - Tele monitor- PVCs,  CT abd neg on 5/17 , repeat CT abd 5/20 - pancolitis   - US abd - 9 mm CBD s/p cholecystectomy   - c/w V protonix and pepcid  - 5/21 - start cipro, flagyl  complete 6 more days  - stool studies - neg   - advance diet  - GI consult  - o/p follow up   - collect stool studies to r/o infectious causes   - Lipase elevated but not significant   - FU echo EF 65 % no significant valvular disease     # HTN  - Continue amlodipine  - Continue Metoprolol  - Continue losartan    # DM 2 with A1C 9.6  - Lantus 20--> 15   - ISS  - episode of hypoglycemia , stop pre-meal insulin    # HLD  - Atorvastatin    # DVT prophylaxis  - Lovenox     Final diagnosis, treatment plan, and follow-up recommendations were discussed and explained to the patient.   The patient was given an opportunity to ask questions concerning the diagnosis and treatment plan.   The patient acknowledged understanding of the diagnosis, treatment, and follow-up recommendations.   The patient was advised to seek urgent care upon discharge if worsening symptoms develop prior to scheduled follow-up.   Time spent on discharge included time with the patient, and also coordinating discharge care as outlined below.  Discharge note faxed to PCP with my contact information to call me back   PCP Dr. Welch  Total time spent: 51 min

## 2024-05-22 NOTE — DISCHARGE NOTE PROVIDER - CARE PROVIDERS DIRECT ADDRESSES
[email or web address]

## 2024-05-22 NOTE — DISCHARGE NOTE PROVIDER - NSDCCPTREATMENT_GEN_ALL_CORE_FT
PRINCIPAL PROCEDURE  Procedure: Abdomen CT  Findings and Treatment: FINDINGS:  LOWER CHEST: Within normal limits.  LIVER: Within normal limits.  BILE DUCTS: Normal caliber.  GALLBLADDER: Cholecystectomy.  SPLEEN: Punctate calcified granuloma.  PANCREAS: No main pancreatic duct dilatation. Mid pancreatic body focal   fat/atrophy, unchanged.  ADRENALS: Within normal limits.  KIDNEYS/URETERS: Within normal limits.  BLADDER: Within normal limits.  REPRODUCTIVE ORGANS: Prostate is enlarged.  BOWEL: Small hiatal hernia. Diffuse mild colonic wall thickening with   mucosal hyperenhancement, compatible with pancolitis. No bowel   obstruction. Appendix is normal.  PERITONEUM: No ascites.  VESSELS: Within normal limits.  RETROPERITONEUM/LYMPH NODES: No lymphadenopathy.  ABDOMINAL WALL: Small fat-containing umbilical hernia. Mild rectus   diastasis. Small fat-containing left-inguinal hernia. Right gluteal   subcutaneous calcified granuloma.  BONES: Degenerative changes.  IMPRESSION:  Pancolitis.

## 2024-05-22 NOTE — DISCHARGE NOTE PROVIDER - NSDCMRMEDTOKEN_GEN_ALL_CORE_FT
acetaminophen 500 mg oral tablet: 2 tab(s) orally every 8 hours  amLODIPine 10 mg oral tablet: 1 tab(s) orally once a day  brimonidine 0.2% ophthalmic solution: 1 drop(s) in each affected eye 2 times a day  Centrum oral tablet: 1 tab(s) orally once a day  Cipro 500 mg oral tablet: 1 tab(s) orally 2 times a day  Farxiga 10 mg oral tablet: 1 tab(s) orally once a day (in the morning)  Glucophage  mg oral tablet, extended release: 2 tab(s) orally 2 times a day  insulin glargine 100 units/mL subcutaneous solution: 15 unit(s) subcutaneous once a day (at bedtime)  losartan 25 mg oral tablet: 1 tab(s) orally once a day  metoprolol succinate 25 mg oral tablet, extended release: 1 tab(s) orally once a day  metroNIDAZOLE 500 mg oral tablet: 1 tab(s) orally every 8 hours  NovoLOG FlexPen 100 units/mL injectable solution: 10 unit(s) injectable 3 times a day (with meals)  oxyCODONE 5 mg oral tablet: 1 tab(s) orally every 6 hours as needed for Moderate Pain (4 - 6) MDD: 20 mg  pantoprazole 40 mg oral delayed release tablet: 1 tab(s) orally once a day  Pepcid 40 mg oral tablet: 1 tab(s) orally once a day (at bedtime)  rosuvastatin 5 mg oral tablet: 1 tab(s) orally once a day  saccharomyces boulardii lyo 250 mg oral capsule: 1 cap(s) orally 2 times a day

## 2024-05-22 NOTE — DISCHARGE NOTE PROVIDER - PROVIDER TOKENS
PROVIDER:[TOKEN:[[id number]],FOLLOWUP:[1 week]],PROVIDER:[TOKEN:[[id number]],FOLLOWUP:[1 week]],PROVIDER:[TOKEN:[[id number]],FOLLOWUP:[1 week]]

## 2024-05-22 NOTE — DISCHARGE NOTE PROVIDER - CARE PROVIDER_API CALL
Navarro Welch  Internal Medicine  [address] [place] [address]  Phone: [phone]  Fax: [phone]  Follow Up Time: 1 week    Maxime Anderson  Gastroenterology  [address] [place] [address]  Phone: [phone]  Fax: [phone]  Follow Up Time: 1 week    Joe Mann  Gastroenterology  [address] [place] [address]  Phone: [phone]  Fax: [phone]  Follow Up Time: 1 week

## 2024-05-22 NOTE — DISCHARGE NOTE NURSING/CASE MANAGEMENT/SOCIAL WORK - PATIENT PORTAL LINK FT
You can access the FollowMyHealth Patient Portal offered by [facility] by registering at the following website: [email or web address]. By joining [email or web address]’s FollowMyHealth portal, you will also be able to view your health information using other applications (apps) compatible with our system.

## 2024-05-29 DIAGNOSIS — I10 ESSENTIAL (PRIMARY) HYPERTENSION: ICD-10-CM

## 2024-05-29 DIAGNOSIS — K85.90 ACUTE PANCREATITIS WITHOUT NECROSIS OR INFECTION, UNSPECIFIED: ICD-10-CM

## 2024-05-29 DIAGNOSIS — R94.31 ABNORMAL ELECTROCARDIOGRAM [ECG] [EKG]: ICD-10-CM

## 2024-05-29 DIAGNOSIS — Z79.4 LONG TERM (CURRENT) USE OF INSULIN: ICD-10-CM

## 2024-05-29 DIAGNOSIS — K52.9 NONINFECTIVE GASTROENTERITIS AND COLITIS, UNSPECIFIED: ICD-10-CM

## 2024-05-29 DIAGNOSIS — Z83.3 FAMILY HISTORY OF DIABETES MELLITUS: ICD-10-CM

## 2024-05-29 DIAGNOSIS — Z80.9 FAMILY HISTORY OF MALIGNANT NEOPLASM, UNSPECIFIED: ICD-10-CM

## 2024-05-29 DIAGNOSIS — E78.5 HYPERLIPIDEMIA, UNSPECIFIED: ICD-10-CM

## 2024-05-29 DIAGNOSIS — K21.00 GASTRO-ESOPHAGEAL REFLUX DISEASE WITH ESOPHAGITIS, WITHOUT BLEEDING: ICD-10-CM

## 2024-05-29 DIAGNOSIS — Z79.84 LONG TERM (CURRENT) USE OF ORAL HYPOGLYCEMIC DRUGS: ICD-10-CM

## 2024-05-29 DIAGNOSIS — Z90.49 ACQUIRED ABSENCE OF OTHER SPECIFIED PARTS OF DIGESTIVE TRACT: ICD-10-CM

## 2024-05-29 DIAGNOSIS — E11.9 TYPE 2 DIABETES MELLITUS WITHOUT COMPLICATIONS: ICD-10-CM

## 2024-08-13 NOTE — ED ADULT TRIAGE NOTE - ESI TRIAGE ACUITY LEVEL, MLM
Patient returned call. Patient updated on positive mycoplasma. Patients preferred pharmacy is [facility] on [address]. Patient advised to not have intercourse for 10 days after completing the antibiotics. Patient verbalized understanding.       2

## 2024-10-11 NOTE — ED ADULT TRIAGE NOTE - STATUS:
Dear Ms. Thomson,    You were admitted to the hospital for an elevated heart rate and feelings of lightheadedness in the setting of an underlying bacterial pneumonia. Please continue the Augmentin antibiotic through 10/14 - we would like you to get a full 7 days of treatment.    We ordered an EKG/heart monitor to look at your heart rhythm as well as an echocardiogram to look at your heart function. We ordered some hormone levels in order to rule out any hormone abnormalities as well. We discharged you with a Holter monitor to evaluate your heart rhythm for several days and had you schedule an appointment with a cardiologist to look at the results.     Thank you for letting us participate in your care,  Your  Care Team   Applied

## 2025-02-22 ENCOUNTER — INPATIENT (INPATIENT)
Facility: HOSPITAL | Age: 75
LOS: 7 days | Discharge: HOME | DRG: 440 | End: 2025-03-02
Attending: INTERNAL MEDICINE | Admitting: INTERNAL MEDICINE
Payer: MEDICARE

## 2025-02-22 VITALS — WEIGHT: 179.9 LBS

## 2025-02-22 DIAGNOSIS — Z90.49 ACQUIRED ABSENCE OF OTHER SPECIFIED PARTS OF DIGESTIVE TRACT: Chronic | ICD-10-CM

## 2025-02-22 DIAGNOSIS — K85.90 ACUTE PANCREATITIS WITHOUT NECROSIS OR INFECTION, UNSPECIFIED: ICD-10-CM

## 2025-02-22 LAB
ALBUMIN SERPL ELPH-MCNC: 3.5 G/DL — SIGNIFICANT CHANGE UP (ref 3.3–5)
ALP SERPL-CCNC: 92 U/L — SIGNIFICANT CHANGE UP (ref 40–120)
ALT FLD-CCNC: 21 U/L — SIGNIFICANT CHANGE UP (ref 12–78)
ANION GAP SERPL CALC-SCNC: 6 MMOL/L — SIGNIFICANT CHANGE UP (ref 5–17)
AST SERPL-CCNC: 11 U/L — LOW (ref 15–37)
BASOPHILS # BLD AUTO: 0.05 K/UL — SIGNIFICANT CHANGE UP (ref 0–0.2)
BASOPHILS NFR BLD AUTO: 0.6 % — SIGNIFICANT CHANGE UP (ref 0–2)
BILIRUB SERPL-MCNC: 0.3 MG/DL — SIGNIFICANT CHANGE UP (ref 0.2–1.2)
BUN SERPL-MCNC: 19 MG/DL — SIGNIFICANT CHANGE UP (ref 7–23)
CALCIUM SERPL-MCNC: 9.5 MG/DL — SIGNIFICANT CHANGE UP (ref 8.5–10.1)
CHLORIDE SERPL-SCNC: 109 MMOL/L — HIGH (ref 96–108)
CO2 SERPL-SCNC: 28 MMOL/L — SIGNIFICANT CHANGE UP (ref 22–31)
CREAT SERPL-MCNC: 1.21 MG/DL — SIGNIFICANT CHANGE UP (ref 0.5–1.3)
EGFR: 62 ML/MIN/1.73M2 — SIGNIFICANT CHANGE UP
EGFR: 62 ML/MIN/1.73M2 — SIGNIFICANT CHANGE UP
EOSINOPHIL # BLD AUTO: 0.21 K/UL — SIGNIFICANT CHANGE UP (ref 0–0.5)
EOSINOPHIL NFR BLD AUTO: 2.6 % — SIGNIFICANT CHANGE UP (ref 0–6)
GLUCOSE SERPL-MCNC: 172 MG/DL — HIGH (ref 70–99)
HCT VFR BLD CALC: 46.1 % — SIGNIFICANT CHANGE UP (ref 39–50)
HGB BLD-MCNC: 15.1 G/DL — SIGNIFICANT CHANGE UP (ref 13–17)
IMM GRANULOCYTES # BLD AUTO: 0.02 K/UL — SIGNIFICANT CHANGE UP (ref 0–0.07)
IMM GRANULOCYTES NFR BLD AUTO: 0.3 % — SIGNIFICANT CHANGE UP (ref 0–0.9)
LIDOCAIN IGE QN: 103 U/L — HIGH (ref 13–75)
LYMPHOCYTES # BLD AUTO: 2.89 K/UL — SIGNIFICANT CHANGE UP (ref 1–3.3)
LYMPHOCYTES NFR BLD AUTO: 36.4 % — SIGNIFICANT CHANGE UP (ref 13–44)
MCHC RBC-ENTMCNC: 28.9 PG — SIGNIFICANT CHANGE UP (ref 27–34)
MCHC RBC-ENTMCNC: 32.8 G/DL — SIGNIFICANT CHANGE UP (ref 32–36)
MCV RBC AUTO: 88.1 FL — SIGNIFICANT CHANGE UP (ref 80–100)
MONOCYTES # BLD AUTO: 0.61 K/UL — SIGNIFICANT CHANGE UP (ref 0–0.9)
MONOCYTES NFR BLD AUTO: 7.7 % — SIGNIFICANT CHANGE UP (ref 2–14)
NEUTROPHILS # BLD AUTO: 4.16 K/UL — SIGNIFICANT CHANGE UP (ref 1.8–7.4)
NEUTROPHILS NFR BLD AUTO: 52.4 % — SIGNIFICANT CHANGE UP (ref 43–77)
NRBC # BLD AUTO: 0 K/UL — SIGNIFICANT CHANGE UP (ref 0–0)
NRBC # FLD: 0 K/UL — SIGNIFICANT CHANGE UP (ref 0–0)
NRBC BLD AUTO-RTO: 0 /100 WBCS — SIGNIFICANT CHANGE UP (ref 0–0)
PLATELET # BLD AUTO: 238 K/UL — SIGNIFICANT CHANGE UP (ref 150–400)
PMV BLD: 10.3 FL — SIGNIFICANT CHANGE UP (ref 7–13)
POTASSIUM SERPL-MCNC: 4.4 MMOL/L — SIGNIFICANT CHANGE UP (ref 3.5–5.3)
POTASSIUM SERPL-SCNC: 4.4 MMOL/L — SIGNIFICANT CHANGE UP (ref 3.5–5.3)
PROT SERPL-MCNC: 8 GM/DL — SIGNIFICANT CHANGE UP (ref 6–8.3)
RBC # BLD: 5.23 M/UL — SIGNIFICANT CHANGE UP (ref 4.2–5.8)
RBC # FLD: 13.3 % — SIGNIFICANT CHANGE UP (ref 10.3–14.5)
SODIUM SERPL-SCNC: 143 MMOL/L — SIGNIFICANT CHANGE UP (ref 135–145)
WBC # BLD: 7.94 K/UL — SIGNIFICANT CHANGE UP (ref 3.8–10.5)
WBC # FLD AUTO: 7.94 K/UL — SIGNIFICANT CHANGE UP (ref 3.8–10.5)

## 2025-02-22 PROCEDURE — 81003 URINALYSIS AUTO W/O SCOPE: CPT

## 2025-02-22 PROCEDURE — 74183 MRI ABD W/O CNTR FLWD CNTR: CPT | Mod: MC

## 2025-02-22 PROCEDURE — 80053 COMPREHEN METABOLIC PANEL: CPT

## 2025-02-22 PROCEDURE — 99285 EMERGENCY DEPT VISIT HI MDM: CPT

## 2025-02-22 PROCEDURE — 83690 ASSAY OF LIPASE: CPT

## 2025-02-22 PROCEDURE — 93010 ELECTROCARDIOGRAM REPORT: CPT

## 2025-02-22 PROCEDURE — A9579: CPT

## 2025-02-22 PROCEDURE — 82784 ASSAY IGA/IGD/IGG/IGM EACH: CPT

## 2025-02-22 PROCEDURE — 74177 CT ABD & PELVIS W/CONTRAST: CPT | Mod: MC

## 2025-02-22 PROCEDURE — 71260 CT THORAX DX C+: CPT | Mod: 26

## 2025-02-22 PROCEDURE — 85027 COMPLETE CBC AUTOMATED: CPT

## 2025-02-22 PROCEDURE — 82962 GLUCOSE BLOOD TEST: CPT

## 2025-02-22 PROCEDURE — 82787 IGG 1 2 3 OR 4 EACH: CPT

## 2025-02-22 PROCEDURE — 71045 X-RAY EXAM CHEST 1 VIEW: CPT | Mod: 26

## 2025-02-22 PROCEDURE — 36415 COLL VENOUS BLD VENIPUNCTURE: CPT

## 2025-02-22 PROCEDURE — 99223 1ST HOSP IP/OBS HIGH 75: CPT

## 2025-02-22 PROCEDURE — 83036 HEMOGLOBIN GLYCOSYLATED A1C: CPT

## 2025-02-22 PROCEDURE — 85025 COMPLETE CBC W/AUTO DIFF WBC: CPT

## 2025-02-22 PROCEDURE — 76705 ECHO EXAM OF ABDOMEN: CPT | Mod: 26

## 2025-02-22 RX ORDER — DEXTROSE 50 % IN WATER 50 %
15 SYRINGE (ML) INTRAVENOUS ONCE
Refills: 0 | Status: DISCONTINUED | OUTPATIENT
Start: 2025-02-22 | End: 2025-03-02

## 2025-02-22 RX ORDER — DEXTROSE 50 % IN WATER 50 %
25 SYRINGE (ML) INTRAVENOUS ONCE
Refills: 0 | Status: DISCONTINUED | OUTPATIENT
Start: 2025-02-22 | End: 2025-03-02

## 2025-02-22 RX ORDER — ACETAMINOPHEN 500 MG/5ML
650 LIQUID (ML) ORAL EVERY 6 HOURS
Refills: 0 | Status: DISCONTINUED | OUTPATIENT
Start: 2025-02-22 | End: 2025-03-02

## 2025-02-22 RX ORDER — AMLODIPINE BESYLATE 10 MG/1
10 TABLET ORAL DAILY
Refills: 0 | Status: DISCONTINUED | OUTPATIENT
Start: 2025-02-23 | End: 2025-03-02

## 2025-02-22 RX ORDER — ONDANSETRON HCL/PF 4 MG/2 ML
4 VIAL (ML) INJECTION ONCE
Refills: 0 | Status: COMPLETED | OUTPATIENT
Start: 2025-02-22 | End: 2025-02-22

## 2025-02-22 RX ORDER — BRIMONIDINE TARTRATE 1.5 MG/ML
1 SOLUTION/ DROPS OPHTHALMIC
Refills: 0 | Status: DISCONTINUED | OUTPATIENT
Start: 2025-02-22 | End: 2025-03-02

## 2025-02-22 RX ORDER — HEPARIN SODIUM 1000 [USP'U]/ML
5000 INJECTION INTRAVENOUS; SUBCUTANEOUS EVERY 8 HOURS
Refills: 0 | Status: DISCONTINUED | OUTPATIENT
Start: 2025-02-22 | End: 2025-03-02

## 2025-02-22 RX ORDER — SODIUM CHLORIDE 9 G/1000ML
1000 INJECTION, SOLUTION INTRAVENOUS
Refills: 0 | Status: DISCONTINUED | OUTPATIENT
Start: 2025-02-22 | End: 2025-03-02

## 2025-02-22 RX ORDER — DEXTROSE 50 % IN WATER 50 %
12.5 SYRINGE (ML) INTRAVENOUS ONCE
Refills: 0 | Status: DISCONTINUED | OUTPATIENT
Start: 2025-02-22 | End: 2025-03-02

## 2025-02-22 RX ORDER — INSULIN LISPRO 100 U/ML
INJECTION, SOLUTION INTRAVENOUS; SUBCUTANEOUS
Refills: 0 | Status: DISCONTINUED | OUTPATIENT
Start: 2025-02-22 | End: 2025-03-02

## 2025-02-22 RX ORDER — LIPASE/PROTEASE/AMYLASE 10K-37.5K
1 CAPSULE,DELAYED RELEASE (ENTERIC COATED) ORAL
Refills: 0 | DISCHARGE

## 2025-02-22 RX ORDER — SUCRALFATE 1 G
1 TABLET ORAL
Refills: 0 | DISCHARGE

## 2025-02-22 RX ORDER — GLUCAGON 3 MG/1
1 POWDER NASAL ONCE
Refills: 0 | Status: DISCONTINUED | OUTPATIENT
Start: 2025-02-22 | End: 2025-03-02

## 2025-02-22 RX ORDER — ROSUVASTATIN CALCIUM 20 MG/1
5 TABLET, FILM COATED ORAL AT BEDTIME
Refills: 0 | Status: DISCONTINUED | OUTPATIENT
Start: 2025-02-22 | End: 2025-03-02

## 2025-02-22 RX ORDER — INSULIN GLARGINE-YFGN 100 [IU]/ML
10 INJECTION, SOLUTION SUBCUTANEOUS EVERY MORNING
Refills: 0 | Status: DISCONTINUED | OUTPATIENT
Start: 2025-02-23 | End: 2025-02-24

## 2025-02-22 RX ORDER — ONDANSETRON HCL/PF 4 MG/2 ML
4 VIAL (ML) INJECTION EVERY 4 HOURS
Refills: 0 | Status: DISCONTINUED | OUTPATIENT
Start: 2025-02-22 | End: 2025-03-02

## 2025-02-22 RX ORDER — INSULIN ASPART 100 [IU]/ML
10 INJECTION, SOLUTION INTRAVENOUS; SUBCUTANEOUS
Refills: 0 | DISCHARGE

## 2025-02-22 RX ADMIN — Medication 4 MILLIGRAM(S): at 19:42

## 2025-02-22 RX ADMIN — Medication 1000 MILLILITER(S): at 19:43

## 2025-02-22 RX ADMIN — Medication 4 MILLIGRAM(S): at 19:43

## 2025-02-22 RX ADMIN — Medication 4 MILLIGRAM(S): at 22:26

## 2025-02-22 NOTE — ED ADULT NURSE NOTE - OBJECTIVE STATEMENT
Pt presents to ED c/o URQ pain that started 3 days ago. Pt states he went to urgent care, and was advised to come to the ED because CT scan showed pancreatitis. Pt denies fevers, N/V/D.

## 2025-02-22 NOTE — ED ADULT TRIAGE NOTE - CHIEF COMPLAINT QUOTE
pt presents to ED due to complaints of abd pain x 3 days sent by urgent care due to CT showing pancreatitis

## 2025-02-22 NOTE — ED ADULT NURSE NOTE - ISOLATION TYPE:
Jesica Peace is a 38year old female. HPI:   Patient presents today reporting intermittent epigastric pain, increased belching, reflux and change in stool.  Patient reports for the past 1 month she has been taking Advil 200-400 mg (with food) daily f mouth.     • Cobalamine Combinations (B12 FOLATE OR) Take by mouth. • escitalopram 5 MG Oral Tab 2.5 mg daily. 0   • Vitamin D3 1000 units Oral Tab Take 1,000 Units by mouth daily.           Past Medical History:   Diagnosis Date   • Anesthesia compl Differential W Platelet [E]      Comp Metabolic Panel (14) [E]      Occult Blood Stool, Diagnostic (Green card or stool)      Meds & Refills for this Visit:  Requested Prescriptions     Signed Prescriptions Disp Refills   • Pantoprazole Sodium 40 MG Oral T None

## 2025-02-22 NOTE — ED STATDOCS - PROGRESS NOTE DETAILS
76 y/o M with PMH of DM, HTN presents with pancreatitis reported on outpatient CT. Pt states pain has been present for a few days. Went to outside  where CT was ordered, performed at [facility]. Received report today that he has pancreatitis on CT and should go to ED for evaluation. Pt reports history of cholecystectomy. CT report notes gallbladder as "grossly unremarkable." Unsure what caused prior episode of pancreatitis which was many years ago as per patient. Denies ETOH use, new medications, fever, chills, nausea, vomiting. PE: Uncomfortable appearing. Cardiac: s1s2, RRR. Lungs: CTAB. Abdomen: NBS x4 soft, epigastric and RUQ tenderness. A/P: Pancreatitis on outpatient CT, will check labs, RUQ US, analgesia, IVF, admission. - Catrachito Chau PA-C

## 2025-02-22 NOTE — ED STATDOCS - PHYSICAL EXAMINATION
Constitutional: AOx3. appears mildly distressed.    Eyes: PERRL EOMI  Head: Normocephalic atraumatic  Mouth: MMM  Cardiac: regular rate and rhythm  Resp: Lungs CTAB  GI: Abd s/nd. RUQ TTP. mild epigastric TTP.  Neuro: CN2-12 grossly intact, TURK x 4  Skin: No visible rashes

## 2025-02-22 NOTE — H&P ADULT - NSHPPHYSICALEXAM_GEN_ALL_CORE
Constitutional: awake, alert, in mild pain, no distress noted   Head: ATNC  Eyes: PERRL, EOMI  Neck: no JVD  EENT: dry mucous membranes, ears and nose externally normal no lesions or deformities  CVS: RRR s1s2  Resp: Lungs CTAB, no wheezing, no stridor  GI: soft abdomen. RUQ TTP. mild epigastric TTP. no rebound no guarding  Neuro: awake, alert, no focal deficits  Skin: Normal temp

## 2025-02-22 NOTE — ED STATDOCS - CLINICAL SUMMARY MEDICAL DECISION MAKING FREE TEXT BOX
patient with diagnosed pancreatitis by CT today. history of cholecystomy, could be retained stone. will do labs, ultrasound, admission for further workup.

## 2025-02-22 NOTE — ED STATDOCS - OBJECTIVE STATEMENT
75 year old male with PMHx of DM, HTN, pancreatitis x1, reported cholecystectomy presents to ED, referred by urgent care c/o right sided abdominal pain x3 days with no associated symptoms.  Denies fever, vomiting, abdominal surgeries within the past ten years.  Denies EtOH consumption or smoking history. patient brings imaging from urgent care showing pancreatitis. Impression: normal gallbladder. consider chest CT for bronchiectasis.

## 2025-02-22 NOTE — H&P ADULT - ASSESSMENT
Acute recurrent pancreatitis ( mild )   -IVF with normal saline at 150 cc per hour  -pain control with morphine 4 mg iv q 4 hr prn  -clear liquid diet  -zofran prn    type ii dm with hyperglycemia  -sliding scale  -lantus 10 units sc daily for now since he is only on clear liquid ( he takes 30 units sc daily at home )  -hold novolog 10 units tid for now until able to eat    HLD  -on crestor 5 mg daily     HTN  -on norvasc 5 mg daily     DVT ppx  -heparin sc

## 2025-02-22 NOTE — H&P ADULT - HISTORY OF PRESENT ILLNESS
this is a  75 year old male with past medical history of insulin dependent Diabetes, HTN, HLD, recurrent pancreatitis,  presents with pancreatitis reported on outpatient CT scan. patient reported he went to urgent care today for abdominal pain. he was sent for ct scan and labs and was called after two hours and was asked to come to the ER. patient stated his pain started about 3 days ago, associated with nausea and poor appetite, food would make his pain worse. he denies vomiting, diarrhea, fevers, chills. Denies ETOH use, no new medications.

## 2025-02-23 LAB
A1C WITH ESTIMATED AVERAGE GLUCOSE RESULT: 8.9 % — HIGH (ref 4–5.6)
ALBUMIN SERPL ELPH-MCNC: 2.9 G/DL — LOW (ref 3.3–5)
ALP SERPL-CCNC: 68 U/L — SIGNIFICANT CHANGE UP (ref 40–120)
ALT FLD-CCNC: 16 U/L — SIGNIFICANT CHANGE UP (ref 12–78)
ANION GAP SERPL CALC-SCNC: 7 MMOL/L — SIGNIFICANT CHANGE UP (ref 5–17)
AST SERPL-CCNC: 11 U/L — LOW (ref 15–37)
BILIRUB SERPL-MCNC: 0.5 MG/DL — SIGNIFICANT CHANGE UP (ref 0.2–1.2)
BUN SERPL-MCNC: 13 MG/DL — SIGNIFICANT CHANGE UP (ref 7–23)
CALCIUM SERPL-MCNC: 8.8 MG/DL — SIGNIFICANT CHANGE UP (ref 8.5–10.1)
CHLORIDE SERPL-SCNC: 108 MMOL/L — SIGNIFICANT CHANGE UP (ref 96–108)
CO2 SERPL-SCNC: 23 MMOL/L — SIGNIFICANT CHANGE UP (ref 22–31)
CREAT SERPL-MCNC: 1.04 MG/DL — SIGNIFICANT CHANGE UP (ref 0.5–1.3)
EGFR: 74 ML/MIN/1.73M2 — SIGNIFICANT CHANGE UP
EGFR: 74 ML/MIN/1.73M2 — SIGNIFICANT CHANGE UP
ESTIMATED AVERAGE GLUCOSE: 209 MG/DL — HIGH (ref 68–114)
GLUCOSE BLDC GLUCOMTR-MCNC: 112 MG/DL — HIGH (ref 70–99)
GLUCOSE BLDC GLUCOMTR-MCNC: 120 MG/DL — HIGH (ref 70–99)
GLUCOSE BLDC GLUCOMTR-MCNC: 97 MG/DL — SIGNIFICANT CHANGE UP (ref 70–99)
GLUCOSE BLDC GLUCOMTR-MCNC: 99 MG/DL — SIGNIFICANT CHANGE UP (ref 70–99)
GLUCOSE SERPL-MCNC: 116 MG/DL — HIGH (ref 70–99)
HCT VFR BLD CALC: 42.6 % — SIGNIFICANT CHANGE UP (ref 39–50)
HGB BLD-MCNC: 13.7 G/DL — SIGNIFICANT CHANGE UP (ref 13–17)
MCHC RBC-ENTMCNC: 28.8 PG — SIGNIFICANT CHANGE UP (ref 27–34)
MCHC RBC-ENTMCNC: 32.2 G/DL — SIGNIFICANT CHANGE UP (ref 32–36)
MCV RBC AUTO: 89.5 FL — SIGNIFICANT CHANGE UP (ref 80–100)
NRBC # BLD AUTO: 0 K/UL — SIGNIFICANT CHANGE UP (ref 0–0)
NRBC # FLD: 0 K/UL — SIGNIFICANT CHANGE UP (ref 0–0)
NRBC BLD AUTO-RTO: 0 /100 WBCS — SIGNIFICANT CHANGE UP (ref 0–0)
PLATELET # BLD AUTO: 209 K/UL — SIGNIFICANT CHANGE UP (ref 150–400)
PMV BLD: 11 FL — SIGNIFICANT CHANGE UP (ref 7–13)
POTASSIUM SERPL-MCNC: 4.2 MMOL/L — SIGNIFICANT CHANGE UP (ref 3.5–5.3)
POTASSIUM SERPL-SCNC: 4.2 MMOL/L — SIGNIFICANT CHANGE UP (ref 3.5–5.3)
PROT SERPL-MCNC: 6.5 GM/DL — SIGNIFICANT CHANGE UP (ref 6–8.3)
RBC # BLD: 4.76 M/UL — SIGNIFICANT CHANGE UP (ref 4.2–5.8)
RBC # FLD: 13.4 % — SIGNIFICANT CHANGE UP (ref 10.3–14.5)
SODIUM SERPL-SCNC: 138 MMOL/L — SIGNIFICANT CHANGE UP (ref 135–145)
WBC # BLD: 7.21 K/UL — SIGNIFICANT CHANGE UP (ref 3.8–10.5)
WBC # FLD AUTO: 7.21 K/UL — SIGNIFICANT CHANGE UP (ref 3.8–10.5)

## 2025-02-23 PROCEDURE — 99233 SBSQ HOSP IP/OBS HIGH 50: CPT

## 2025-02-23 RX ADMIN — HEPARIN SODIUM 5000 UNIT(S): 1000 INJECTION INTRAVENOUS; SUBCUTANEOUS at 22:06

## 2025-02-23 RX ADMIN — Medication 650 MILLIGRAM(S): at 09:07

## 2025-02-23 RX ADMIN — BRIMONIDINE TARTRATE 1 DROP(S): 1.5 SOLUTION/ DROPS OPHTHALMIC at 09:11

## 2025-02-23 RX ADMIN — Medication 4 MILLIGRAM(S): at 06:08

## 2025-02-23 RX ADMIN — Medication 4 MILLIGRAM(S): at 13:39

## 2025-02-23 RX ADMIN — Medication 150 MILLILITER(S): at 00:02

## 2025-02-23 RX ADMIN — Medication 150 MILLILITER(S): at 00:50

## 2025-02-23 RX ADMIN — Medication 40 MILLIGRAM(S): at 05:53

## 2025-02-23 RX ADMIN — HEPARIN SODIUM 5000 UNIT(S): 1000 INJECTION INTRAVENOUS; SUBCUTANEOUS at 13:39

## 2025-02-23 RX ADMIN — Medication 650 MILLIGRAM(S): at 22:04

## 2025-02-23 RX ADMIN — BRIMONIDINE TARTRATE 1 DROP(S): 1.5 SOLUTION/ DROPS OPHTHALMIC at 22:07

## 2025-02-23 RX ADMIN — AMLODIPINE BESYLATE 10 MILLIGRAM(S): 10 TABLET ORAL at 09:11

## 2025-02-23 RX ADMIN — ROSUVASTATIN CALCIUM 5 MILLIGRAM(S): 20 TABLET, FILM COATED ORAL at 22:04

## 2025-02-23 RX ADMIN — Medication 75 MILLILITER(S): at 13:39

## 2025-02-23 RX ADMIN — HEPARIN SODIUM 5000 UNIT(S): 1000 INJECTION INTRAVENOUS; SUBCUTANEOUS at 05:54

## 2025-02-23 NOTE — PROGRESS NOTE ADULT - ASSESSMENT
A/P:    Acute recurrent pancreatitis ( mild )   -IVF   -pain control with morphine 4 mg iv q 4 hr prn  -clear liquid diet  -zofran prn  -MRI abd  -IgG subsets  -GI service is following     type ii dm with hyperglycemia  -sliding scale  -lantus 10 units sc daily for now since he is only on clear liquid ( he takes 30 units sc daily at home )  -hold novolog 10 units tid for now until able to eat    HLD  -on crestor 5 mg daily     HTN  -on norvasc 5 mg daily     DVT ppx  -heparin sc

## 2025-02-23 NOTE — CONSULT NOTE ADULT - ASSESSMENT
Imp:  Recurrent pancreatitis, clinically mild, idiopathic    Rec:  MRCP  IGG subsets  Clears  Can drop IV fluids

## 2025-02-23 NOTE — CONSULT NOTE ADULT - SUBJECTIVE AND OBJECTIVE BOX
HPI:  this is a  75 year old male with past medical history of insulin dependent Diabetes, HTN, HLD, recurrent pancreatitis,  presents with pancreatitis reported on outpatient CT scan. patient reported he went to urgent care today for abdominal pain. he was sent for ct scan and labs and was called after two hours and was asked to come to the ER. patient stated his pain started about 3 days ago, associated with nausea and poor appetite, food would make his pain worse. he denies vomiting, diarrhea, fevers, chills. Denies ETOH use, no new medications.  (22 Feb 2025 22:15)  -------------  He's had pancreatitis in the past, unclear what the story about this was, last saw Dr. Mann 2 years ago  No etoh or tobacco    PAST MEDICAL & SURGICAL HISTORY:  Pancreatitis      HTN (hypertension)      DM (diabetes mellitus)      S/P laparoscopic cholecystectomy          Home Medications:  amLODIPine 10 mg oral tablet: 1 tab(s) orally once a day (22 Feb 2025 22:07)  brimonidine 0.2% ophthalmic solution: 1 drop(s) in each affected eye 2 times a day (22 Feb 2025 22:07)  Farxiga 10 mg oral tablet: 1 tab(s) orally once a day (in the morning) (22 Feb 2025 22:07)  Glucophage  mg oral tablet, extended release: 2 tab(s) orally 2 times a day (22 Feb 2025 22:07)  insulin aspart 100 units/mL subcutaneous solution: 10 unit(s) subcutaneous 3 times a day (with meals) (22 Feb 2025 22:08)  insulin glargine 100 units/mL subcutaneous solution: 30 unit(s) subcutaneous once a day (at bedtime) (22 Feb 2025 22:06)  pancrelipase 24,000 units-76,000 units-120,000 units oral delayed release capsule: 1 cap(s) orally 3 times a day (with meals) (22 Feb 2025 22:09)  rosuvastatin 5 mg oral tablet: 1 tab(s) orally once a day (22 Feb 2025 22:07)  sucralfate 1 g oral tablet: 1 tab(s) orally 4 times a day (22 Feb 2025 22:10)      MEDICATIONS  (STANDING):  amLODIPine   Tablet 10 milliGRAM(s) Oral daily  brimonidine 0.2% Ophthalmic Solution 1 Drop(s) Both EYES two times a day  dextrose 5%. 1000 milliLiter(s) (100 mL/Hr) IV Continuous <Continuous>  dextrose 5%. 1000 milliLiter(s) (50 mL/Hr) IV Continuous <Continuous>  dextrose 50% Injectable 25 Gram(s) IV Push once  dextrose 50% Injectable 12.5 Gram(s) IV Push once  dextrose 50% Injectable 25 Gram(s) IV Push once  glucagon  Injectable 1 milliGRAM(s) IntraMuscular once  heparin   Injectable 5000 Unit(s) SubCutaneous every 8 hours  insulin glargine Injectable (LANTUS) 10 Unit(s) SubCutaneous every morning  insulin lispro (ADMELOG) corrective regimen sliding scale   SubCutaneous three times a day before meals  pantoprazole    Tablet 40 milliGRAM(s) Oral before breakfast  rosuvastatin 5 milliGRAM(s) Oral at bedtime  sodium chloride 0.9%. 1000 milliLiter(s) (150 mL/Hr) IV Continuous <Continuous>    MEDICATIONS  (PRN):  acetaminophen     Tablet .. 650 milliGRAM(s) Oral every 6 hours PRN Temp greater or equal to 38C (100.4F), Mild Pain (1 - 3), Moderate Pain (4 - 6)  dextrose Oral Gel 15 Gram(s) Oral once PRN Blood Glucose LESS THAN 70 milliGRAM(s)/deciliter  morphine  - Injectable 4 milliGRAM(s) IV Push every 4 hours PRN Severe Pain (7 - 10)  ondansetron Injectable 4 milliGRAM(s) IV Push every 4 hours PRN Nausea and/or Vomiting      Allergies    No Known Allergies    Intolerances        SOCIAL HISTORY:    FAMILY HISTORY:  Family history of diabetes mellitus in brother    Family history of cancer in mother        ROS  As above  Otherwise unremarkable    Vital Signs Last 24 Hrs  T(C): 36.6 (23 Feb 2025 09:00), Max: 37.1 (22 Feb 2025 18:31)  T(F): 97.9 (23 Feb 2025 09:00), Max: 98.8 (22 Feb 2025 18:31)  HR: 65 (23 Feb 2025 09:00) (59 - 76)  BP: 133/76 (23 Feb 2025 09:00) (126/69 - 145/75)  BP(mean): 82 (22 Feb 2025 23:35) (82 - 97)  RR: 18 (23 Feb 2025 09:00) (18 - 18)  SpO2: 96% (23 Feb 2025 09:00) (96% - 100%)    Parameters below as of 23 Feb 2025 09:00  Patient On (Oxygen Delivery Method): room air        Constitutional: NAD, well-developed  Respiratory: CTAB  Cardiovascular: S1 and S2, RRR  Gastrointestinal: BS+, soft, NT/ND  Extremities: No peripheral edema  Psychiatric: Normal mood, normal affect  Skin: No rashes    LABS:                        13.7   7.21  )-----------( 209      ( 23 Feb 2025 07:50 )             42.6     02-23    138  |  108  |  13  ----------------------------<  116[H]  4.2   |  23  |  1.04    Ca    8.8      23 Feb 2025 07:50    TPro  6.5  /  Alb  2.9[L]  /  TBili  0.5  /  DBili  x   /  AST  11[L]  /  ALT  16  /  AlkPhos  68  02-23      LIVER FUNCTIONS - ( 23 Feb 2025 07:50 )  Alb: 2.9 g/dL / Pro: 6.5 gm/dL / ALK PHOS: 68 U/L / ALT: 16 U/L / AST: 11 U/L / GGT: x             RADIOLOGY & ADDITIONAL STUDIES:

## 2025-02-24 LAB
ALBUMIN SERPL ELPH-MCNC: 2.7 G/DL — LOW (ref 3.3–5)
ALP SERPL-CCNC: 68 U/L — SIGNIFICANT CHANGE UP (ref 40–120)
ALT FLD-CCNC: 15 U/L — SIGNIFICANT CHANGE UP (ref 12–78)
ANION GAP SERPL CALC-SCNC: 10 MMOL/L — SIGNIFICANT CHANGE UP (ref 5–17)
AST SERPL-CCNC: 8 U/L — LOW (ref 15–37)
BASOPHILS # BLD AUTO: 0.05 K/UL — SIGNIFICANT CHANGE UP (ref 0–0.2)
BASOPHILS NFR BLD AUTO: 0.8 % — SIGNIFICANT CHANGE UP (ref 0–2)
BILIRUB SERPL-MCNC: 0.4 MG/DL — SIGNIFICANT CHANGE UP (ref 0.2–1.2)
BUN SERPL-MCNC: 9 MG/DL — SIGNIFICANT CHANGE UP (ref 7–23)
CALCIUM SERPL-MCNC: 8.8 MG/DL — SIGNIFICANT CHANGE UP (ref 8.5–10.1)
CHLORIDE SERPL-SCNC: 107 MMOL/L — SIGNIFICANT CHANGE UP (ref 96–108)
CO2 SERPL-SCNC: 21 MMOL/L — LOW (ref 22–31)
CREAT SERPL-MCNC: 0.92 MG/DL — SIGNIFICANT CHANGE UP (ref 0.5–1.3)
EGFR: 86 ML/MIN/1.73M2 — SIGNIFICANT CHANGE UP
EGFR: 86 ML/MIN/1.73M2 — SIGNIFICANT CHANGE UP
EOSINOPHIL # BLD AUTO: 0.18 K/UL — SIGNIFICANT CHANGE UP (ref 0–0.5)
EOSINOPHIL NFR BLD AUTO: 2.7 % — SIGNIFICANT CHANGE UP (ref 0–6)
GLUCOSE BLDC GLUCOMTR-MCNC: 100 MG/DL — HIGH (ref 70–99)
GLUCOSE BLDC GLUCOMTR-MCNC: 202 MG/DL — HIGH (ref 70–99)
GLUCOSE BLDC GLUCOMTR-MCNC: 222 MG/DL — HIGH (ref 70–99)
GLUCOSE BLDC GLUCOMTR-MCNC: 86 MG/DL — SIGNIFICANT CHANGE UP (ref 70–99)
GLUCOSE SERPL-MCNC: 97 MG/DL — SIGNIFICANT CHANGE UP (ref 70–99)
HCT VFR BLD CALC: 41.9 % — SIGNIFICANT CHANGE UP (ref 39–50)
HGB BLD-MCNC: 14 G/DL — SIGNIFICANT CHANGE UP (ref 13–17)
IGG FLD-MCNC: 994 MG/DL — SIGNIFICANT CHANGE UP (ref 610–1660)
IGG1 SER-MCNC: 559 MG/DL — SIGNIFICANT CHANGE UP (ref 240–1118)
IGG2 SER-MCNC: 366 MG/DL — SIGNIFICANT CHANGE UP (ref 124–549)
IGG3 SER-MCNC: 29.3 MG/DL — SIGNIFICANT CHANGE UP (ref 15–102)
IGG4 SER-MCNC: 59 MG/DL — SIGNIFICANT CHANGE UP (ref 1–123)
IMM GRANULOCYTES # BLD AUTO: 0.01 K/UL — SIGNIFICANT CHANGE UP (ref 0–0.07)
IMM GRANULOCYTES NFR BLD AUTO: 0.2 % — SIGNIFICANT CHANGE UP (ref 0–0.9)
LIDOCAIN IGE QN: 32 U/L — SIGNIFICANT CHANGE UP (ref 13–75)
LYMPHOCYTES # BLD AUTO: 2.95 K/UL — SIGNIFICANT CHANGE UP (ref 1–3.3)
LYMPHOCYTES NFR BLD AUTO: 44.9 % — HIGH (ref 13–44)
MCHC RBC-ENTMCNC: 29.5 PG — SIGNIFICANT CHANGE UP (ref 27–34)
MCHC RBC-ENTMCNC: 33.4 G/DL — SIGNIFICANT CHANGE UP (ref 32–36)
MCV RBC AUTO: 88.4 FL — SIGNIFICANT CHANGE UP (ref 80–100)
MONOCYTES # BLD AUTO: 0.59 K/UL — SIGNIFICANT CHANGE UP (ref 0–0.9)
MONOCYTES NFR BLD AUTO: 9 % — SIGNIFICANT CHANGE UP (ref 2–14)
NEUTROPHILS # BLD AUTO: 2.79 K/UL — SIGNIFICANT CHANGE UP (ref 1.8–7.4)
NEUTROPHILS NFR BLD AUTO: 42.4 % — LOW (ref 43–77)
NRBC # BLD AUTO: 0 K/UL — SIGNIFICANT CHANGE UP (ref 0–0)
NRBC # FLD: 0 K/UL — SIGNIFICANT CHANGE UP (ref 0–0)
NRBC BLD AUTO-RTO: 0 /100 WBCS — SIGNIFICANT CHANGE UP (ref 0–0)
PLATELET # BLD AUTO: 196 K/UL — SIGNIFICANT CHANGE UP (ref 150–400)
PMV BLD: 10.3 FL — SIGNIFICANT CHANGE UP (ref 7–13)
POTASSIUM SERPL-MCNC: 3.9 MMOL/L — SIGNIFICANT CHANGE UP (ref 3.5–5.3)
POTASSIUM SERPL-SCNC: 3.9 MMOL/L — SIGNIFICANT CHANGE UP (ref 3.5–5.3)
PROT SERPL-MCNC: 6.3 GM/DL — SIGNIFICANT CHANGE UP (ref 6–8.3)
RBC # BLD: 4.74 M/UL — SIGNIFICANT CHANGE UP (ref 4.2–5.8)
RBC # FLD: 13.2 % — SIGNIFICANT CHANGE UP (ref 10.3–14.5)
SODIUM SERPL-SCNC: 138 MMOL/L — SIGNIFICANT CHANGE UP (ref 135–145)
WBC # BLD: 6.57 K/UL — SIGNIFICANT CHANGE UP (ref 3.8–10.5)
WBC # FLD AUTO: 6.57 K/UL — SIGNIFICANT CHANGE UP (ref 3.8–10.5)

## 2025-02-24 PROCEDURE — 74183 MRI ABD W/O CNTR FLWD CNTR: CPT | Mod: 26

## 2025-02-24 PROCEDURE — 99233 SBSQ HOSP IP/OBS HIGH 50: CPT

## 2025-02-24 RX ADMIN — BRIMONIDINE TARTRATE 1 DROP(S): 1.5 SOLUTION/ DROPS OPHTHALMIC at 10:49

## 2025-02-24 RX ADMIN — HEPARIN SODIUM 5000 UNIT(S): 1000 INJECTION INTRAVENOUS; SUBCUTANEOUS at 21:25

## 2025-02-24 RX ADMIN — Medication 650 MILLIGRAM(S): at 21:24

## 2025-02-24 RX ADMIN — Medication 4 MILLIGRAM(S): at 04:41

## 2025-02-24 RX ADMIN — Medication 650 MILLIGRAM(S): at 15:04

## 2025-02-24 RX ADMIN — HEPARIN SODIUM 5000 UNIT(S): 1000 INJECTION INTRAVENOUS; SUBCUTANEOUS at 06:41

## 2025-02-24 RX ADMIN — Medication 650 MILLIGRAM(S): at 15:50

## 2025-02-24 RX ADMIN — INSULIN LISPRO 2: 100 INJECTION, SOLUTION INTRAVENOUS; SUBCUTANEOUS at 17:21

## 2025-02-24 RX ADMIN — ROSUVASTATIN CALCIUM 5 MILLIGRAM(S): 20 TABLET, FILM COATED ORAL at 21:24

## 2025-02-24 RX ADMIN — HEPARIN SODIUM 5000 UNIT(S): 1000 INJECTION INTRAVENOUS; SUBCUTANEOUS at 15:04

## 2025-02-24 RX ADMIN — AMLODIPINE BESYLATE 10 MILLIGRAM(S): 10 TABLET ORAL at 10:50

## 2025-02-24 RX ADMIN — Medication 40 MILLIGRAM(S): at 06:41

## 2025-02-24 RX ADMIN — Medication 650 MILLIGRAM(S): at 22:15

## 2025-02-24 RX ADMIN — BRIMONIDINE TARTRATE 1 DROP(S): 1.5 SOLUTION/ DROPS OPHTHALMIC at 21:25

## 2025-02-24 NOTE — PROGRESS NOTE ADULT - ASSESSMENT
Imp:  Changes in the pancreatic head, likely inflammatory, less likely malignant  However, IgG 4 level is normal    Rec:  Should have EUS which can be outpatient - emphasized the importance of this - should have f/u with Dr. Godwin in timely fashion post d/c.

## 2025-02-24 NOTE — PROGRESS NOTE ADULT - ASSESSMENT
A/P:    Acute recurrent pancreatitis ( mild )   -IVF   -pain control with morphine 4 mg iv q 4 hr prn  -clear liquid diet  -zofran prn  -MRI abd  -IgG subsets  -GI service is following     type ii dm with hyperglycemia  -sliding scale  -lantus 10 units sc daily for now since he is only on clear liquid ( he takes 30 units sc daily at home )  -hold novolog 10 units tid for now until able to eat    HLD  -on crestor 5 mg daily     HTN  -on norvasc 5 mg daily     DVT ppx  -heparin sc  A/P:    Acute recurrent pancreatitis ( mild )   - improved clinically   - pain control with morphine 4 mg iv q 4 hr prn  - advanced diet to regular   -zofran prn  -MRI abd done-will follow GI consult for further management   -IgG subsets ordered   -GI service is following     type ii dm with hyperglycemia  -sliding scale  - follow Dxt    HLD  -on crestor 5 mg daily     HTN  -on norvasc 5 mg daily     DVT ppx  -heparin sc     Disposition pending final GI rec after MRI abd. may dc home in 1-2 days

## 2025-02-25 LAB
APPEARANCE UR: CLEAR — SIGNIFICANT CHANGE UP
BILIRUB UR-MCNC: NEGATIVE — SIGNIFICANT CHANGE UP
COLOR SPEC: YELLOW — SIGNIFICANT CHANGE UP
DIFF PNL FLD: NEGATIVE — SIGNIFICANT CHANGE UP
GLUCOSE BLDC GLUCOMTR-MCNC: 149 MG/DL — HIGH (ref 70–99)
GLUCOSE BLDC GLUCOMTR-MCNC: 203 MG/DL — HIGH (ref 70–99)
GLUCOSE BLDC GLUCOMTR-MCNC: 203 MG/DL — HIGH (ref 70–99)
GLUCOSE BLDC GLUCOMTR-MCNC: 221 MG/DL — HIGH (ref 70–99)
GLUCOSE UR QL: >=1000 MG/DL
KETONES UR-MCNC: 15 MG/DL
LEUKOCYTE ESTERASE UR-ACNC: NEGATIVE — SIGNIFICANT CHANGE UP
NITRITE UR-MCNC: NEGATIVE — SIGNIFICANT CHANGE UP
PH UR: 6 — SIGNIFICANT CHANGE UP (ref 5–8)
PROT UR-MCNC: NEGATIVE MG/DL — SIGNIFICANT CHANGE UP
SP GR SPEC: >1.03 — HIGH (ref 1–1.03)
UROBILINOGEN FLD QL: 0.2 MG/DL — SIGNIFICANT CHANGE UP (ref 0.2–1)

## 2025-02-25 PROCEDURE — 99233 SBSQ HOSP IP/OBS HIGH 50: CPT

## 2025-02-25 PROCEDURE — 74177 CT ABD & PELVIS W/CONTRAST: CPT | Mod: 26

## 2025-02-25 RX ORDER — INSULIN LISPRO 100 U/ML
4 INJECTION, SOLUTION INTRAVENOUS; SUBCUTANEOUS
Refills: 0 | Status: DISCONTINUED | OUTPATIENT
Start: 2025-02-25 | End: 2025-03-02

## 2025-02-25 RX ORDER — INSULIN GLARGINE-YFGN 100 [IU]/ML
12 INJECTION, SOLUTION SUBCUTANEOUS AT BEDTIME
Refills: 0 | Status: DISCONTINUED | OUTPATIENT
Start: 2025-02-25 | End: 2025-03-02

## 2025-02-25 RX ADMIN — HEPARIN SODIUM 5000 UNIT(S): 1000 INJECTION INTRAVENOUS; SUBCUTANEOUS at 06:08

## 2025-02-25 RX ADMIN — Medication 650 MILLIGRAM(S): at 13:00

## 2025-02-25 RX ADMIN — Medication 650 MILLIGRAM(S): at 06:50

## 2025-02-25 RX ADMIN — BRIMONIDINE TARTRATE 1 DROP(S): 1.5 SOLUTION/ DROPS OPHTHALMIC at 09:43

## 2025-02-25 RX ADMIN — BRIMONIDINE TARTRATE 1 DROP(S): 1.5 SOLUTION/ DROPS OPHTHALMIC at 23:34

## 2025-02-25 RX ADMIN — Medication 4 MILLIGRAM(S): at 20:15

## 2025-02-25 RX ADMIN — HEPARIN SODIUM 5000 UNIT(S): 1000 INJECTION INTRAVENOUS; SUBCUTANEOUS at 13:13

## 2025-02-25 RX ADMIN — AMLODIPINE BESYLATE 10 MILLIGRAM(S): 10 TABLET ORAL at 09:43

## 2025-02-25 RX ADMIN — ROSUVASTATIN CALCIUM 5 MILLIGRAM(S): 20 TABLET, FILM COATED ORAL at 23:34

## 2025-02-25 RX ADMIN — INSULIN LISPRO 2: 100 INJECTION, SOLUTION INTRAVENOUS; SUBCUTANEOUS at 16:53

## 2025-02-25 RX ADMIN — Medication 4 MILLIGRAM(S): at 19:49

## 2025-02-25 RX ADMIN — Medication 650 MILLIGRAM(S): at 12:15

## 2025-02-25 RX ADMIN — INSULIN LISPRO 4 UNIT(S): 100 INJECTION, SOLUTION INTRAVENOUS; SUBCUTANEOUS at 16:53

## 2025-02-25 RX ADMIN — INSULIN LISPRO 2: 100 INJECTION, SOLUTION INTRAVENOUS; SUBCUTANEOUS at 11:59

## 2025-02-25 RX ADMIN — Medication 650 MILLIGRAM(S): at 06:07

## 2025-02-25 RX ADMIN — INSULIN GLARGINE-YFGN 12 UNIT(S): 100 INJECTION, SOLUTION SUBCUTANEOUS at 23:33

## 2025-02-25 RX ADMIN — Medication 40 MILLIGRAM(S): at 06:08

## 2025-02-25 NOTE — PROGRESS NOTE ADULT - ASSESSMENT
Imp:  Exam is benign but I don't have an explanation for the lower abdominal pain    Rec:  CT abd/pel (lower abdomen wasn't initially imaged)  UA/urine culture

## 2025-02-25 NOTE — PROGRESS NOTE ADULT - ASSESSMENT
75 year old male with past medical history of insulin dependent Diabetes, HTN, HLD, recurrent pancreatitis,  presents with pancreatitis reported on outpatient CT scan.    Acute recurrent pancreatitis ( mild ), improved   Improved clinically   - pain control with morphine 4 mg iv q 4 hr prn  - advanced diet to regular   - zofran prn  - MRI abd notable for Subtle enlargement and abnormal signal in the pancreatic head -will need follow up with GI as an outpt for EUS   - IgG 4 level wnl    - GI input noted; F/u CT A/P given lower abd pain    Type ii dm with hyperglycemia  - Cont Basal, Bolus regimen as ordered + sliding scale    HLD  -on crestor 5 mg daily     HTN  -on norvasc 5 mg daily     DVT ppx  -heparin sc     Disposition: Anticipate D/c home tomorrow if lower abd pain is improved. Pending GI recs Re; CT A/P results.

## 2025-02-26 LAB
GLUCOSE BLDC GLUCOMTR-MCNC: 104 MG/DL — HIGH (ref 70–99)
GLUCOSE BLDC GLUCOMTR-MCNC: 169 MG/DL — HIGH (ref 70–99)
GLUCOSE BLDC GLUCOMTR-MCNC: 177 MG/DL — HIGH (ref 70–99)
GLUCOSE BLDC GLUCOMTR-MCNC: 256 MG/DL — HIGH (ref 70–99)

## 2025-02-26 PROCEDURE — 99233 SBSQ HOSP IP/OBS HIGH 50: CPT

## 2025-02-26 RX ORDER — LIPASE/PROTEASE/AMYLASE 10K-37.5K
2 CAPSULE,DELAYED RELEASE (ENTERIC COATED) ORAL
Refills: 0 | Status: DISCONTINUED | OUTPATIENT
Start: 2025-02-26 | End: 2025-03-02

## 2025-02-26 RX ORDER — LIPASE/PROTEASE/AMYLASE 10K-37.5K
1 CAPSULE,DELAYED RELEASE (ENTERIC COATED) ORAL
Refills: 0 | Status: DISCONTINUED | OUTPATIENT
Start: 2025-02-26 | End: 2025-02-26

## 2025-02-26 RX ADMIN — Medication 4 MILLIGRAM(S): at 06:25

## 2025-02-26 RX ADMIN — Medication 4 MILLIGRAM(S): at 14:57

## 2025-02-26 RX ADMIN — Medication 40 MILLIGRAM(S): at 06:00

## 2025-02-26 RX ADMIN — Medication 4 MILLIGRAM(S): at 05:58

## 2025-02-26 RX ADMIN — ROSUVASTATIN CALCIUM 5 MILLIGRAM(S): 20 TABLET, FILM COATED ORAL at 21:46

## 2025-02-26 RX ADMIN — BRIMONIDINE TARTRATE 1 DROP(S): 1.5 SOLUTION/ DROPS OPHTHALMIC at 21:47

## 2025-02-26 RX ADMIN — AMLODIPINE BESYLATE 10 MILLIGRAM(S): 10 TABLET ORAL at 11:12

## 2025-02-26 RX ADMIN — Medication 2 CAPSULE(S): at 18:23

## 2025-02-26 RX ADMIN — INSULIN LISPRO 3: 100 INJECTION, SOLUTION INTRAVENOUS; SUBCUTANEOUS at 12:15

## 2025-02-26 RX ADMIN — Medication 4 MILLIGRAM(S): at 00:25

## 2025-02-26 RX ADMIN — Medication 650 MILLIGRAM(S): at 21:46

## 2025-02-26 RX ADMIN — Medication 2 CAPSULE(S): at 12:16

## 2025-02-26 RX ADMIN — Medication 4 MILLIGRAM(S): at 15:27

## 2025-02-26 RX ADMIN — INSULIN LISPRO 4 UNIT(S): 100 INJECTION, SOLUTION INTRAVENOUS; SUBCUTANEOUS at 12:16

## 2025-02-26 RX ADMIN — Medication 4 MILLIGRAM(S): at 00:00

## 2025-02-26 RX ADMIN — BRIMONIDINE TARTRATE 1 DROP(S): 1.5 SOLUTION/ DROPS OPHTHALMIC at 11:12

## 2025-02-26 NOTE — PROGRESS NOTE ADULT - ASSESSMENT
75 year old male with past medical history of insulin dependent Diabetes, HTN, HLD, recurrent pancreatitis,  presents with pancreatitis reported on outpatient CT scan.    #Acute recurrent pancreatitis ( mild ), improved   Improving clinically   - pain control with morphine 4 mg iv q 4 hr prn  - Diet currently FLD  - resume Creon  - zofran prn  - MRI abd notable for Subtle enlargement and abnormal signal in the pancreatic head -will need follow up with GI as an outpt for EUS   - F/u CT A/P notable for Mild mural thickening in the descending colon, which may represent colitis  - IgG 4 level wnl    - GI input noted    Type II DMwith hyperglycemia  - Cont Basal, Bolus regimen as ordered + sliding scale    HLD  -on crestor 5 mg daily     HTN  -on norvasc 5 mg daily     DVT ppx  -heparin sc     Disposition: Anticipate D/c home tomorrow if lower abd pain is improved.

## 2025-02-26 NOTE — DIETITIAN INITIAL EVALUATION ADULT - ORAL INTAKE PTA/DIET HISTORY
Lives at home w/ wife and son. Cooking/grocery shopping shared among pt and his wife w/ no difficulties. Reports good appetite PTA, consuming 2-3 meals. States he avoids high fat/fried foods as he has had pancreatitis in the past. Occasionally consumes Glucerna. Likely meeting <75% ENN chronically. W/ T2DM, on insulin, metofrmin, and farxiga at home. Checks BG levels via fingersticks. Has not received DM diet education in the past.  Lives at home w/ wife and son. Cooking/grocery shopping shared among pt and his wife w/ no difficulties. Reports good appetite PTA, consuming 2-3 meals. States he avoids high fat/fried foods as he has had pancreatitis in the past. Occasionally consumes Glucerna. Likely meeting </= 75% ENN chronically. W/ T2DM, on insulin, metofrmin, and farxiga at home. Checks BG levels via fingersticks. Has not received DM diet education in the past.

## 2025-02-26 NOTE — DIETITIAN INITIAL EVALUATION ADULT - LAB (SPECIFY)
Consider obtaining vitamin D 25OH level to assess nutriture  Consider obtaining vitamin D 25OH level to assess nutriture.

## 2025-02-26 NOTE — DIETITIAN INITIAL EVALUATION ADULT - SIGNS/SYMPTOMS
AEB abdominal pain d/t food intake, full liquid diet.  AEB c/o abdominal pain following food intake, full liquid diet.

## 2025-02-26 NOTE — DIETITIAN INITIAL EVALUATION ADULT - PERTINENT MEDS FT
MEDICATIONS  (STANDING):  amLODIPine   Tablet 10 milliGRAM(s) Oral daily  brimonidine 0.2% Ophthalmic Solution 1 Drop(s) Both EYES two times a day  dextrose 5%. 1000 milliLiter(s) (100 mL/Hr) IV Continuous <Continuous>  dextrose 5%. 1000 milliLiter(s) (50 mL/Hr) IV Continuous <Continuous>  dextrose 50% Injectable 25 Gram(s) IV Push once  dextrose 50% Injectable 12.5 Gram(s) IV Push once  dextrose 50% Injectable 25 Gram(s) IV Push once  glucagon  Injectable 1 milliGRAM(s) IntraMuscular once  heparin   Injectable 5000 Unit(s) SubCutaneous every 8 hours  insulin glargine Injectable (LANTUS) 12 Unit(s) SubCutaneous at bedtime  insulin lispro (ADMELOG) corrective regimen sliding scale   SubCutaneous three times a day before meals  insulin lispro Injectable (ADMELOG) 4 Unit(s) SubCutaneous three times a day before meals  pancrelipase  (CREON 12,000 Lipase Units) 2 Capsule(s) Oral three times a day with meals  pantoprazole    Tablet 40 milliGRAM(s) Oral before breakfast  rosuvastatin 5 milliGRAM(s) Oral at bedtime    MEDICATIONS  (PRN):  acetaminophen     Tablet .. 650 milliGRAM(s) Oral every 6 hours PRN Temp greater or equal to 38C (100.4F), Mild Pain (1 - 3), Moderate Pain (4 - 6)  dextrose Oral Gel 15 Gram(s) Oral once PRN Blood Glucose LESS THAN 70 milliGRAM(s)/deciliter  morphine  - Injectable 4 milliGRAM(s) IV Push every 4 hours PRN Severe Pain (7 - 10)  ondansetron Injectable 4 milliGRAM(s) IV Push every 4 hours PRN Nausea and/or Vomiting

## 2025-02-26 NOTE — DIETITIAN INITIAL EVALUATION ADULT - OTHER INFO
76 y/o M w/ PMHx of insulin dependent Diabetes, HTN, HLD, recurrent pancreatitis, presents with pancreatitis reported on outpatient CT scan. patient reported he went to urgent care today for abdominal pain. He was sent for ct scan and labs and was called after two hours and was asked to come to the ER. patient stated his pain started about 3 days ago, associated with nausea and poor appetite, food would make his pain worse. he denies vomiting, diarrhea, fevers, chills. Denies ETOH use, no new medications. He's had pancreatitis in the past, unclear what the story about this was, last saw Dr. Mann 2 years ago. Changes in the pancreatic head, likely inflammatory, less likely malignant. However, IgG 4 level is normal. Per GI, Should have EUS which can be outpatient. CT A/P ordered given lower abd pain. Admit dx: Acute recurrent pancreatitis ( mild ), T2DM w/ hyperglycemia.    Pt prev seen by nutrition services on previous admission 10/24/20, did not meet criteria for PCM, received education on low fat diet. Upon RD visit, pt c/o abdominal pain. Was on consistent carbohydrate diet on visit, reports consuming 50% chicken salad sandwich yesterday and soup w/ mashed potatoes for dinner yesterday (2/26) and experienced abdominal discomfort following. Bed scale wt obtained by RD 2/26: 172#. Per pt, UBW of 180# last year. Wt hx per previous RD note 10/24/20: 175#. Unintentional wt loss of #8/4% x 1 year (not clinically significant). Appears appropriate wt for ht - NFPE reveals no muscle/fat wasting at this time. Since RD visit this AM, diet switched to full liquid diet 2/2 abdominal pain. Recc to ADAT as per GI. Once able recommend low fat diet 2/2 pancreatitis. W/ T2DM, POCTs x 24 hours elevated (256, 177, 203, 221), most recent HbA1c 8.9 elevated 2/23/24 - shows uncontrolled BG levels. Pt reports never receiving DM diet education, pt requested ed. Add ensure max BID (provides 150kcal, 30g protein) to optimize nutritional needs - pt is receptive.    76 y/o M w/ PMHx of insulin dependent Diabetes, HTN, HLD, recurrent pancreatitis, presents with pancreatitis reported on outpatient CT scan. Patient reported he went to urgent care today for abdominal pain. He was sent for ct scan and labs and was called after two hours and was asked to come to the ER. Patient stated his pain started about 3 days ago, associated with nausea and poor appetite, food would make his pain worse. He denies vomiting, diarrhea, fevers, chills. He's had pancreatitis in the past, unclear what the story about this was, last saw Dr. Mann 2 years ago. Changes in the pancreatic head, likely inflammatory, less likely malignant. However, IgG 4 level is normal. Per GI, Should have EUS which can be outpatient. CT A/P ordered given lower abd pain. Admit dx: Acute recurrent pancreatitis ( mild ), T2DM w/ hyperglycemia.    Pt prev seen by nutrition services on previous admission 10/24/20, did not meet criteria for PCM, received education on low fat diet. Upon RD visit, pt c/o abdominal pain. Was on consistent carbohydrate diet upon visit, reports consuming 50% chicken salad sandwich and soup w/ mashed potatoes for dinner yesterday (2/26) and experienced abdominal discomfort following. Bed scale wt obtained by RD 2/26: 172#. Per pt, UBW of 180# last year. Wt hx per previous RD note 10/24/20: 175#. Unintentional wt loss of #8/4% x 1 year (not clinically significant). Appears appropriate wt for ht - NFPE reveals no muscle/fat wasting at this time. Since RD visit this AM, diet switched to full liquid diet 2/2 c/o abdominal pain. Recc to ADAT as per GI. Once able, recc low fat diet 2/2 pancreatitis. Will add ensure max BID (provides 150kcal, 30g protein) to optimize nutritional needs - pt is receptive. W/ T2DM, POCTs x 24 hours elevated (256, 177, 203, 221), most recent HbA1c 8.9 elevated 2/23/24 - shows uncontrolled BG levels. Pt reports never receiving DM diet education, pt requested ed. RD provided verbal and written education on building a balanced plate, understanding A1c level, and counting carbohydrates. Pt will likely need reinforcement, therfore provided outpatient RD to f/u with. See additional recs below.

## 2025-02-26 NOTE — DIETITIAN INITIAL EVALUATION ADULT - LITERATURE/VIDEOS GIVEN
NCM Counting Carbohydrates, Reading Food Labels; [employer] Understanding A1c Level, Building a Balanced Plate

## 2025-02-26 NOTE — DIETITIAN INITIAL EVALUATION ADULT - PERTINENT LABORATORY DATA
POCT Blood Glucose.: 256 mg/dL (02-26-25 @ 12:08)  A1C with Estimated Average Glucose Result: 8.9 % (02-23-25 @ 07:50)  A1C with Estimated Average Glucose Result: 9.6 % (05-19-24 @ 06:40)

## 2025-02-26 NOTE — DIETITIAN INITIAL EVALUATION ADULT - ADD RECOMMEND
1) Currently on full liquid diet. ADAT as per GI, recc advancing to low fat diet once medically feasible.   2) Add ensure max BID (provides 150kcal, 30g protein) to optimize nutritional needs - pt is receptive.  1) Currently on full liquid diet. ADAT as per GI, recc advancing to low fat diet once medically feasible.   2) Add ensure max BID (provides 150kcal, 30g protein) to optimize nutritional needs - pt is receptive.   3) Encourage protein-rich foods, maximize food preferences.  4) Monitor bowel movements, if no BM for >3 days, consider implementing bowel regimen.  5) Maintain optimal BG control of < 140-180 mg/dL.   6) MVI w/ minerals daily to ensure 100% RDA met.  7) Monitor lytes/ min and replete prn. Consider obtaining vitamin D 25OH level to assess nutriture.   8) Obtain weekly wts to track/trend changes.   9) Encourage f/u w/ outpatient RD for reinforcement of DM diet ed.   10) Confirm goals of care regarding nutrition support.  RD will continue to monitor PO intake, labs, hydration, and wt prn.

## 2025-02-27 ENCOUNTER — TRANSCRIPTION ENCOUNTER (OUTPATIENT)
Age: 75
End: 2025-02-27

## 2025-02-27 LAB
ALBUMIN SERPL ELPH-MCNC: 3.3 G/DL — SIGNIFICANT CHANGE UP (ref 3.3–5)
ALP SERPL-CCNC: 78 U/L — SIGNIFICANT CHANGE UP (ref 40–120)
ALT FLD-CCNC: 49 U/L — SIGNIFICANT CHANGE UP (ref 12–78)
ANION GAP SERPL CALC-SCNC: 7 MMOL/L — SIGNIFICANT CHANGE UP (ref 5–17)
AST SERPL-CCNC: 35 U/L — SIGNIFICANT CHANGE UP (ref 15–37)
BILIRUB SERPL-MCNC: 0.4 MG/DL — SIGNIFICANT CHANGE UP (ref 0.2–1.2)
BUN SERPL-MCNC: 9 MG/DL — SIGNIFICANT CHANGE UP (ref 7–23)
CALCIUM SERPL-MCNC: 9.8 MG/DL — SIGNIFICANT CHANGE UP (ref 8.5–10.1)
CHLORIDE SERPL-SCNC: 107 MMOL/L — SIGNIFICANT CHANGE UP (ref 96–108)
CO2 SERPL-SCNC: 24 MMOL/L — SIGNIFICANT CHANGE UP (ref 22–31)
CREAT SERPL-MCNC: 1.14 MG/DL — SIGNIFICANT CHANGE UP (ref 0.5–1.3)
EGFR: 66 ML/MIN/1.73M2 — SIGNIFICANT CHANGE UP
EGFR: 66 ML/MIN/1.73M2 — SIGNIFICANT CHANGE UP
GLUCOSE BLDC GLUCOMTR-MCNC: 136 MG/DL — HIGH (ref 70–99)
GLUCOSE BLDC GLUCOMTR-MCNC: 155 MG/DL — HIGH (ref 70–99)
GLUCOSE BLDC GLUCOMTR-MCNC: 155 MG/DL — HIGH (ref 70–99)
GLUCOSE BLDC GLUCOMTR-MCNC: 162 MG/DL — HIGH (ref 70–99)
GLUCOSE BLDC GLUCOMTR-MCNC: 190 MG/DL — HIGH (ref 70–99)
GLUCOSE SERPL-MCNC: 168 MG/DL — HIGH (ref 70–99)
HCT VFR BLD CALC: 46.6 % — SIGNIFICANT CHANGE UP (ref 39–50)
HGB BLD-MCNC: 15.3 G/DL — SIGNIFICANT CHANGE UP (ref 13–17)
MCHC RBC-ENTMCNC: 28.8 PG — SIGNIFICANT CHANGE UP (ref 27–34)
MCHC RBC-ENTMCNC: 32.8 G/DL — SIGNIFICANT CHANGE UP (ref 32–36)
MCV RBC AUTO: 87.8 FL — SIGNIFICANT CHANGE UP (ref 80–100)
NRBC # BLD AUTO: 0 K/UL — SIGNIFICANT CHANGE UP (ref 0–0)
NRBC # FLD: 0 K/UL — SIGNIFICANT CHANGE UP (ref 0–0)
NRBC BLD AUTO-RTO: 0 /100 WBCS — SIGNIFICANT CHANGE UP (ref 0–0)
PLATELET # BLD AUTO: 240 K/UL — SIGNIFICANT CHANGE UP (ref 150–400)
PMV BLD: 10.5 FL — SIGNIFICANT CHANGE UP (ref 7–13)
POTASSIUM SERPL-MCNC: 4 MMOL/L — SIGNIFICANT CHANGE UP (ref 3.5–5.3)
POTASSIUM SERPL-SCNC: 4 MMOL/L — SIGNIFICANT CHANGE UP (ref 3.5–5.3)
PROT SERPL-MCNC: 7.8 GM/DL — SIGNIFICANT CHANGE UP (ref 6–8.3)
RBC # BLD: 5.31 M/UL — SIGNIFICANT CHANGE UP (ref 4.2–5.8)
RBC # FLD: 13.2 % — SIGNIFICANT CHANGE UP (ref 10.3–14.5)
SODIUM SERPL-SCNC: 138 MMOL/L — SIGNIFICANT CHANGE UP (ref 135–145)
WBC # BLD: 6.95 K/UL — SIGNIFICANT CHANGE UP (ref 3.8–10.5)
WBC # FLD AUTO: 6.95 K/UL — SIGNIFICANT CHANGE UP (ref 3.8–10.5)

## 2025-02-27 PROCEDURE — 99233 SBSQ HOSP IP/OBS HIGH 50: CPT

## 2025-02-27 RX ORDER — ACETAMINOPHEN 500 MG/5ML
2 LIQUID (ML) ORAL
Qty: 0 | Refills: 0 | DISCHARGE
Start: 2025-02-27

## 2025-02-27 RX ORDER — OXYCODONE HYDROCHLORIDE 30 MG/1
5 TABLET ORAL EVERY 4 HOURS
Refills: 0 | Status: DISCONTINUED | OUTPATIENT
Start: 2025-02-27 | End: 2025-03-02

## 2025-02-27 RX ORDER — OXYCODONE HYDROCHLORIDE 30 MG/1
1 TABLET ORAL
Qty: 12 | Refills: 0
Start: 2025-02-27 | End: 2025-03-01

## 2025-02-27 RX ORDER — MAGNESIUM, ALUMINUM HYDROXIDE 200-200 MG
30 TABLET,CHEWABLE ORAL EVERY 4 HOURS
Refills: 0 | Status: DISCONTINUED | OUTPATIENT
Start: 2025-02-27 | End: 2025-03-02

## 2025-02-27 RX ORDER — OXYCODONE HYDROCHLORIDE 30 MG/1
10 TABLET ORAL EVERY 4 HOURS
Refills: 0 | Status: DISCONTINUED | OUTPATIENT
Start: 2025-02-27 | End: 2025-03-02

## 2025-02-27 RX ADMIN — OXYCODONE HYDROCHLORIDE 10 MILLIGRAM(S): 30 TABLET ORAL at 16:21

## 2025-02-27 RX ADMIN — OXYCODONE HYDROCHLORIDE 5 MILLIGRAM(S): 30 TABLET ORAL at 16:20

## 2025-02-27 RX ADMIN — Medication 4 MILLIGRAM(S): at 05:31

## 2025-02-27 RX ADMIN — INSULIN LISPRO 1: 100 INJECTION, SOLUTION INTRAVENOUS; SUBCUTANEOUS at 11:57

## 2025-02-27 RX ADMIN — OXYCODONE HYDROCHLORIDE 10 MILLIGRAM(S): 30 TABLET ORAL at 17:30

## 2025-02-27 RX ADMIN — BRIMONIDINE TARTRATE 1 DROP(S): 1.5 SOLUTION/ DROPS OPHTHALMIC at 09:35

## 2025-02-27 RX ADMIN — Medication 2 CAPSULE(S): at 09:33

## 2025-02-27 RX ADMIN — Medication 650 MILLIGRAM(S): at 12:01

## 2025-02-27 RX ADMIN — Medication 650 MILLIGRAM(S): at 11:01

## 2025-02-27 RX ADMIN — INSULIN LISPRO 1: 100 INJECTION, SOLUTION INTRAVENOUS; SUBCUTANEOUS at 16:44

## 2025-02-27 RX ADMIN — AMLODIPINE BESYLATE 10 MILLIGRAM(S): 10 TABLET ORAL at 09:33

## 2025-02-27 RX ADMIN — Medication 40 MILLIGRAM(S): at 05:33

## 2025-02-27 RX ADMIN — INSULIN LISPRO 1: 100 INJECTION, SOLUTION INTRAVENOUS; SUBCUTANEOUS at 09:27

## 2025-02-27 RX ADMIN — INSULIN LISPRO 4 UNIT(S): 100 INJECTION, SOLUTION INTRAVENOUS; SUBCUTANEOUS at 09:28

## 2025-02-27 RX ADMIN — INSULIN GLARGINE-YFGN 12 UNIT(S): 100 INJECTION, SOLUTION SUBCUTANEOUS at 22:09

## 2025-02-27 RX ADMIN — Medication 125 MILLILITER(S): at 16:21

## 2025-02-27 RX ADMIN — ROSUVASTATIN CALCIUM 5 MILLIGRAM(S): 20 TABLET, FILM COATED ORAL at 22:06

## 2025-02-27 RX ADMIN — Medication 30 MILLILITER(S): at 18:28

## 2025-02-27 RX ADMIN — OXYCODONE HYDROCHLORIDE 10 MILLIGRAM(S): 30 TABLET ORAL at 22:06

## 2025-02-27 RX ADMIN — INSULIN GLARGINE-YFGN 12 UNIT(S): 100 INJECTION, SOLUTION SUBCUTANEOUS at 00:25

## 2025-02-27 RX ADMIN — OXYCODONE HYDROCHLORIDE 5 MILLIGRAM(S): 30 TABLET ORAL at 14:24

## 2025-02-27 RX ADMIN — INSULIN LISPRO 4 UNIT(S): 100 INJECTION, SOLUTION INTRAVENOUS; SUBCUTANEOUS at 11:58

## 2025-02-27 NOTE — DISCHARGE NOTE PROVIDER - NSDCCAREPROVSEEN_GEN_ALL_CORE_FT
Marielle, Alda Keen, Yesi Victoria, Joshua León, [facility] Marielle, Alda Keen, Yesi Victoria, Joshua León, Rah Sinha, Samaria CLOUD

## 2025-02-27 NOTE — DISCHARGE NOTE PROVIDER - NSDCCONDITION_GEN_ALL_CORE
Received pt to unit Via stretcher, AAO denies pain requested to eat breakfast prior to HD start. ANTELMO fistula +bruit/thrill accessed w/ 15 gauge needles w/o difficulty, lines secured with tape and blue clamp. Per MD HD for 3hrs instead of 3.5hrs and remove 1l of fluid BP permitting.   Stable

## 2025-02-27 NOTE — DISCHARGE NOTE PROVIDER - NSDCCPCAREPLAN_GEN_ALL_CORE_FT
PRINCIPAL DISCHARGE DIAGNOSIS  Diagnosis: Pancreatitis  Assessment and Plan of Treatment:      PRINCIPAL DISCHARGE DIAGNOSIS  Diagnosis: Pancreatitis  Assessment and Plan of Treatment: Improved clinically. Need follow up with Gastroenterologist for EUS as oupatient.

## 2025-02-27 NOTE — PROGRESS NOTE ADULT - ASSESSMENT
Imp:  Resolving recurrent pancreatitis    Rec:  Advance diet to low fat/diabetic  Stable by me for d.c and he can f/u with me and Dr. Godwin (EUS)

## 2025-02-27 NOTE — DISCHARGE NOTE PROVIDER - CARE PROVIDERS DIRECT ADDRESSES
[email or web address]

## 2025-02-27 NOTE — DISCHARGE NOTE PROVIDER - HOSPITAL COURSE
75 year old male with past medical history of insulin dependent Diabetes, HTN, HLD, recurrent pancreatitis,  presents with pancreatitis reported on outpatient CT scan.    #Acute recurrent pancreatitis ( mild ), improved   Improving clinically   - Tolerating diet with improved pain level  - resume Creon  - Oxycodone prn for 3 days  - MRI abd notable for Subtle enlargement and abnormal signal in the pancreatic head -will need follow up with GI as an outpt for EUS   - F/u CT A/P notable for Mild mural thickening in the descending colon, which may represent colitis  - IgG 4 level wnl    - GI input noted    Type II DM with hyperglycemia  - Cont home regimen    HLD  -on crestor 5 mg daily     HTN  -on norvasc 5 mg daily    75 year old male with past medical history of insulin dependent Diabetes, HTN, HLD, recurrent pancreatitis,  presents with pancreatitis reported on outpatient CT scan.      #Acute recurrent pancreatitis   - Cont PO Oxycodone prn  - Diet as tolerated  - Resume Creon  - Zofran prn  - MRI abd notable for Subtle enlargement and abnormal signal in the pancreatic head -will need follow up with GI as an outpt for EUS   - CT A/P notable for Mild mural thickening in the descending colon, which may represent colitis  - IgG 4 level wnl  - GI input noted; discussed case with Dr. León-->Cont supportive care with pain meds for now, will need follow up EUS as an outpatient   -tolerating diet   -no more abdominal pain    Type II DM with hyperglycemia  -stable     HLD  -on crestor 5 mg daily     HTN  -on norvasc 5 mg daily     DVT ppx  -heparin sc     Patient is seen and examined. Will dc home today.  Vital Signs Last 24 Hrs  T(C): 36.7 (02 Mar 2025 07:47), Max: 36.7 (01 Mar 2025 15:01)  T(F): 98.1 (02 Mar 2025 07:47), Max: 98.1 (01 Mar 2025 15:01)  HR: 62 (02 Mar 2025 09:05) (56 - 69)  BP: 133/78 (02 Mar 2025 09:05) (133/78 - 147/76)  BP(mean): --  RR: 19 (02 Mar 2025 07:47) (18 - 19)  SpO2: 96% (02 Mar 2025 07:47) (96% - 97%)    Parameters below as of 02 Mar 2025 00:45  Patient On (Oxygen Delivery Method): room air    PHYSICAL EXAM:  GENERAL: NAD, lying in bed comfortably  HEAD:  Atraumatic, Normocephalic  EYES: conjunctiva and sclera clear  ENT: Moist mucous membranes  NECK: Supple, No JVD  CHEST/LUNG: Clear to auscultation bilaterally; No rales, rhonchi, wheezing. Unlabored respirations  HEART: Regular rate and rhythm; No murmurs  ABDOMEN: Bowel sounds present; Soft, Nontender, Nondistended.   EXTREMITIES:  2+ Peripheral Pulses, brisk capillary refill. No clubbing, cyanosis, or edema  NERVOUS SYSTEM:  Alert & Oriented X3, speech clear. No deficits   MSK: FROM all 4 extremities, full and equal strength

## 2025-02-27 NOTE — DISCHARGE NOTE PROVIDER - PROVIDER TOKENS
PROVIDER:[TOKEN:[[id number]],FOLLOWUP:[1 week],ESTABLISHEDPATIENT:[T]],PROVIDER:[TOKEN:[[id number]],FOLLOWUP:[1 week]],PROVIDER:[TOKEN:[[id number]],FOLLOWUP:[1 week]]

## 2025-02-27 NOTE — DISCHARGE NOTE PROVIDER - NSDCMRMEDTOKEN_GEN_ALL_CORE_FT
acetaminophen 325 mg oral tablet: 2 tab(s) orally every 6 hours As needed Temp greater or equal to 38C (100.4F), Mild Pain (1 - 3), Moderate Pain (4 - 6)  amLODIPine 10 mg oral tablet: 1 tab(s) orally once a day  brimonidine 0.2% ophthalmic solution: 1 drop(s) in each affected eye 2 times a day  Farxiga 10 mg oral tablet: 1 tab(s) orally once a day (in the morning)  Glucophage  mg oral tablet, extended release: 2 tab(s) orally 2 times a day  insulin aspart 100 units/mL subcutaneous solution: 10 unit(s) subcutaneous 3 times a day (with meals)  insulin glargine 100 units/mL subcutaneous solution: 30 unit(s) subcutaneous once a day (at bedtime)  oxyCODONE 5 mg oral tablet: 1 tab(s) orally 4 times a day as needed for  severe pain MDD: 20 mg in a day  pancrelipase 24,000 units-76,000 units-120,000 units oral delayed release capsule: 1 cap(s) orally 3 times a day (with meals)  pantoprazole 40 mg oral delayed release tablet: 1 tab(s) orally once a day  rosuvastatin 5 mg oral tablet: 1 tab(s) orally once a day  sucralfate 1 g oral tablet: 1 tab(s) orally 4 times a day   acetaminophen 325 mg oral tablet: 2 tab(s) orally every 6 hours As needed Temp greater or equal to 38C (100.4F), Mild Pain (1 - 3), Moderate Pain (4 - 6)  amLODIPine 10 mg oral tablet: 1 tab(s) orally once a day  brimonidine 0.2% ophthalmic solution: 1 drop(s) in each affected eye 2 times a day  Farxiga 10 mg oral tablet: 1 tab(s) orally once a day (in the morning)  Glucophage  mg oral tablet, extended release: 2 tab(s) orally 2 times a day  insulin aspart 100 units/mL subcutaneous solution: 10 unit(s) subcutaneous 3 times a day (with meals)  insulin glargine 100 units/mL subcutaneous solution: 30 unit(s) subcutaneous once a day (at bedtime)  pancrelipase 24,000 units-76,000 units-120,000 units oral delayed release capsule: 1 cap(s) orally 3 times a day (with meals)  pantoprazole 40 mg oral delayed release tablet: 1 tab(s) orally once a day  polyethylene glycol 3350 oral powder for reconstitution: 17 gram(s) orally once a day as needed for Constipation  rosuvastatin 5 mg oral tablet: 1 tab(s) orally once a day  senna leaf extract oral tablet: 2 tab(s) orally once a day (at bedtime) as needed for Constipation  sucralfate 1 g oral tablet: 1 tab(s) orally 4 times a day

## 2025-02-27 NOTE — DISCHARGE NOTE PROVIDER - ATTENDING DISCHARGE PHYSICAL EXAMINATION:
VITALS:  T(F): 97 (02-27-25 @ 10:56), Max: 98.1 (02-27-25 @ 00:24)  HR: 63 (02-27-25 @ 10:56) (56 - 74)  BP: 118/73 (02-27-25 @ 10:56) (118/73 - 137/78)  RR: 20 (02-27-25 @ 10:56) (19 - 20)  SpO2: 98% (02-27-25 @ 10:56) (95% - 98%)  Wt(kg): --    I&O's Summary      CAPILLARY BLOOD GLUCOSE      POCT Blood Glucose.: 190 mg/dL (27 Feb 2025 11:29)  POCT Blood Glucose.: 162 mg/dL (27 Feb 2025 08:59)  POCT Blood Glucose.: 155 mg/dL (27 Feb 2025 00:24)  POCT Blood Glucose.: 169 mg/dL (26 Feb 2025 22:40)  POCT Blood Glucose.: 104 mg/dL (26 Feb 2025 17:25)      PHYSICAL EXAM:  Gen: No acute distress  HEENT:  Normocephalic/Atraumatic  CV:  +S1, +S2, regular, no murmurs or rubs  RESP:   lungs clear to auscultation bilaterally, no wheezing, rales, rhonchi  GI:  abdomen soft, non-distended, mild TTP in the RUQ (improved). No rebound tenderness, guarding, or rigidity  EXT:  no clubbing, no cyanosis, no edema

## 2025-02-27 NOTE — DISCHARGE NOTE PROVIDER - CARE PROVIDER_API CALL
Navarro Welch  Internal Medicine  [address] [place] [address]  Phone: [phone]  Fax: [phone]  Established Patient  Follow Up Time: 1 week    Rah León  Gastroenterology  [address], [address]  [place] [address]  Phone: [phone]  Fax: [phone]  Follow Up Time: 1 week    Pedro Godwin  Gastroenterology  [address], [place] [address]  Phone: [phone]  Fax: [phone]  Follow Up Time: 1 week

## 2025-02-28 LAB
ALBUMIN SERPL ELPH-MCNC: 2.7 G/DL — LOW (ref 3.3–5)
ALP SERPL-CCNC: 70 U/L — SIGNIFICANT CHANGE UP (ref 40–120)
ALT FLD-CCNC: 42 U/L — SIGNIFICANT CHANGE UP (ref 12–78)
ANION GAP SERPL CALC-SCNC: 5 MMOL/L — SIGNIFICANT CHANGE UP (ref 5–17)
AST SERPL-CCNC: 23 U/L — SIGNIFICANT CHANGE UP (ref 15–37)
BILIRUB SERPL-MCNC: 0.3 MG/DL — SIGNIFICANT CHANGE UP (ref 0.2–1.2)
BUN SERPL-MCNC: 13 MG/DL — SIGNIFICANT CHANGE UP (ref 7–23)
CALCIUM SERPL-MCNC: 8.6 MG/DL — SIGNIFICANT CHANGE UP (ref 8.5–10.1)
CHLORIDE SERPL-SCNC: 109 MMOL/L — HIGH (ref 96–108)
CO2 SERPL-SCNC: 23 MMOL/L — SIGNIFICANT CHANGE UP (ref 22–31)
CREAT SERPL-MCNC: 0.99 MG/DL — SIGNIFICANT CHANGE UP (ref 0.5–1.3)
CULTURE RESULTS: SIGNIFICANT CHANGE UP
CULTURE RESULTS: SIGNIFICANT CHANGE UP
EGFR: 78 ML/MIN/1.73M2 — SIGNIFICANT CHANGE UP
EGFR: 78 ML/MIN/1.73M2 — SIGNIFICANT CHANGE UP
GLUCOSE BLDC GLUCOMTR-MCNC: 102 MG/DL — HIGH (ref 70–99)
GLUCOSE BLDC GLUCOMTR-MCNC: 124 MG/DL — HIGH (ref 70–99)
GLUCOSE BLDC GLUCOMTR-MCNC: 147 MG/DL — HIGH (ref 70–99)
GLUCOSE BLDC GLUCOMTR-MCNC: 155 MG/DL — HIGH (ref 70–99)
GLUCOSE SERPL-MCNC: 140 MG/DL — HIGH (ref 70–99)
HCT VFR BLD CALC: 41.4 % — SIGNIFICANT CHANGE UP (ref 39–50)
HGB BLD-MCNC: 13.6 G/DL — SIGNIFICANT CHANGE UP (ref 13–17)
MCHC RBC-ENTMCNC: 29 PG — SIGNIFICANT CHANGE UP (ref 27–34)
MCHC RBC-ENTMCNC: 32.9 G/DL — SIGNIFICANT CHANGE UP (ref 32–36)
MCV RBC AUTO: 88.3 FL — SIGNIFICANT CHANGE UP (ref 80–100)
NRBC # BLD AUTO: 0 K/UL — SIGNIFICANT CHANGE UP (ref 0–0)
NRBC # FLD: 0 K/UL — SIGNIFICANT CHANGE UP (ref 0–0)
NRBC BLD AUTO-RTO: 0 /100 WBCS — SIGNIFICANT CHANGE UP (ref 0–0)
PLATELET # BLD AUTO: 226 K/UL — SIGNIFICANT CHANGE UP (ref 150–400)
PMV BLD: 10.8 FL — SIGNIFICANT CHANGE UP (ref 7–13)
POTASSIUM SERPL-MCNC: 3.8 MMOL/L — SIGNIFICANT CHANGE UP (ref 3.5–5.3)
POTASSIUM SERPL-SCNC: 3.8 MMOL/L — SIGNIFICANT CHANGE UP (ref 3.5–5.3)
PROT SERPL-MCNC: 6.7 GM/DL — SIGNIFICANT CHANGE UP (ref 6–8.3)
RBC # BLD: 4.69 M/UL — SIGNIFICANT CHANGE UP (ref 4.2–5.8)
RBC # FLD: 13.2 % — SIGNIFICANT CHANGE UP (ref 10.3–14.5)
SODIUM SERPL-SCNC: 137 MMOL/L — SIGNIFICANT CHANGE UP (ref 135–145)
SPECIMEN SOURCE: SIGNIFICANT CHANGE UP
SPECIMEN SOURCE: SIGNIFICANT CHANGE UP
WBC # BLD: 6.5 K/UL — SIGNIFICANT CHANGE UP (ref 3.8–10.5)
WBC # FLD AUTO: 6.5 K/UL — SIGNIFICANT CHANGE UP (ref 3.8–10.5)

## 2025-02-28 PROCEDURE — 99233 SBSQ HOSP IP/OBS HIGH 50: CPT

## 2025-02-28 RX ORDER — MELATONIN 5 MG
5 TABLET ORAL AT BEDTIME
Refills: 0 | Status: DISCONTINUED | OUTPATIENT
Start: 2025-02-28 | End: 2025-03-02

## 2025-02-28 RX ORDER — SENNA 187 MG
2 TABLET ORAL AT BEDTIME
Refills: 0 | Status: DISCONTINUED | OUTPATIENT
Start: 2025-02-28 | End: 2025-03-02

## 2025-02-28 RX ORDER — POLYETHYLENE GLYCOL 3350 17 G/17G
17 POWDER, FOR SOLUTION ORAL DAILY
Refills: 0 | Status: DISCONTINUED | OUTPATIENT
Start: 2025-02-28 | End: 2025-03-02

## 2025-02-28 RX ADMIN — BRIMONIDINE TARTRATE 1 DROP(S): 1.5 SOLUTION/ DROPS OPHTHALMIC at 11:24

## 2025-02-28 RX ADMIN — INSULIN LISPRO 1: 100 INJECTION, SOLUTION INTRAVENOUS; SUBCUTANEOUS at 12:53

## 2025-02-28 RX ADMIN — INSULIN GLARGINE-YFGN 12 UNIT(S): 100 INJECTION, SOLUTION SUBCUTANEOUS at 21:20

## 2025-02-28 RX ADMIN — INSULIN LISPRO 4 UNIT(S): 100 INJECTION, SOLUTION INTRAVENOUS; SUBCUTANEOUS at 12:54

## 2025-02-28 RX ADMIN — OXYCODONE HYDROCHLORIDE 10 MILLIGRAM(S): 30 TABLET ORAL at 05:55

## 2025-02-28 RX ADMIN — Medication 125 MILLILITER(S): at 00:40

## 2025-02-28 RX ADMIN — OXYCODONE HYDROCHLORIDE 10 MILLIGRAM(S): 30 TABLET ORAL at 17:26

## 2025-02-28 RX ADMIN — Medication 125 MILLILITER(S): at 21:26

## 2025-02-28 RX ADMIN — Medication 2 TABLET(S): at 21:22

## 2025-02-28 RX ADMIN — OXYCODONE HYDROCHLORIDE 10 MILLIGRAM(S): 30 TABLET ORAL at 11:28

## 2025-02-28 RX ADMIN — Medication 2 CAPSULE(S): at 11:25

## 2025-02-28 RX ADMIN — INSULIN LISPRO 4 UNIT(S): 100 INJECTION, SOLUTION INTRAVENOUS; SUBCUTANEOUS at 17:20

## 2025-02-28 RX ADMIN — Medication 40 MILLIGRAM(S): at 05:34

## 2025-02-28 RX ADMIN — AMLODIPINE BESYLATE 10 MILLIGRAM(S): 10 TABLET ORAL at 11:25

## 2025-02-28 RX ADMIN — HEPARIN SODIUM 5000 UNIT(S): 1000 INJECTION INTRAVENOUS; SUBCUTANEOUS at 05:36

## 2025-02-28 RX ADMIN — Medication 2 CAPSULE(S): at 17:15

## 2025-02-28 RX ADMIN — INSULIN LISPRO 4 UNIT(S): 100 INJECTION, SOLUTION INTRAVENOUS; SUBCUTANEOUS at 09:12

## 2025-02-28 RX ADMIN — OXYCODONE HYDROCHLORIDE 10 MILLIGRAM(S): 30 TABLET ORAL at 05:35

## 2025-02-28 RX ADMIN — OXYCODONE HYDROCHLORIDE 10 MILLIGRAM(S): 30 TABLET ORAL at 02:38

## 2025-02-28 RX ADMIN — ROSUVASTATIN CALCIUM 5 MILLIGRAM(S): 20 TABLET, FILM COATED ORAL at 21:24

## 2025-02-28 RX ADMIN — BRIMONIDINE TARTRATE 1 DROP(S): 1.5 SOLUTION/ DROPS OPHTHALMIC at 21:21

## 2025-02-28 RX ADMIN — BRIMONIDINE TARTRATE 1 DROP(S): 1.5 SOLUTION/ DROPS OPHTHALMIC at 00:40

## 2025-02-28 RX ADMIN — Medication 5 MILLIGRAM(S): at 00:54

## 2025-02-28 RX ADMIN — OXYCODONE HYDROCHLORIDE 10 MILLIGRAM(S): 30 TABLET ORAL at 12:02

## 2025-02-28 RX ADMIN — Medication 125 MILLILITER(S): at 17:15

## 2025-02-28 RX ADMIN — POLYETHYLENE GLYCOL 3350 17 GRAM(S): 17 POWDER, FOR SOLUTION ORAL at 11:25

## 2025-02-28 RX ADMIN — Medication 125 MILLILITER(S): at 09:12

## 2025-02-28 NOTE — PROGRESS NOTE ADULT - ASSESSMENT
75 year old male with past medical history of insulin dependent Diabetes, HTN, HLD, recurrent pancreatitis,  presents with pancreatitis reported on outpatient CT scan.    #Acute recurrent pancreatitis   - Cont PO Oxycodone prn  - IVF hydration ordered  - Diet as tolerated  - Resume Creon  - Zofran prn  - MRI abd notable for Subtle enlargement and abnormal signal in the pancreatic head -will need follow up with GI as an outpt for EUS   - F/u CT A/P notable for Mild mural thickening in the descending colon, which may represent colitis  - IgG 4 level wnl  - Patient continues to have abdominal pain and with decreased PO intake.     - GI input noted; discussed case with Dr. León this AM-->Cont supportive care with pain meds for now, will need follow up EUS as an outpatient once pancreatitis episode is resolved    Type II DM with hyperglycemia  - Cont Basal, Bolus regimen as ordered + sliding scale    HLD  -on crestor 5 mg daily     HTN  -on norvasc 5 mg daily     DVT ppx  -heparin sc     Disposition: Anticipate D/C home in 1-2 days pending improvement of abdominal pain and PO intake.

## 2025-03-01 LAB
GLUCOSE BLDC GLUCOMTR-MCNC: 132 MG/DL — HIGH (ref 70–99)
GLUCOSE BLDC GLUCOMTR-MCNC: 137 MG/DL — HIGH (ref 70–99)
GLUCOSE BLDC GLUCOMTR-MCNC: 187 MG/DL — HIGH (ref 70–99)
GLUCOSE BLDC GLUCOMTR-MCNC: 233 MG/DL — HIGH (ref 70–99)

## 2025-03-01 PROCEDURE — 99232 SBSQ HOSP IP/OBS MODERATE 35: CPT

## 2025-03-01 RX ADMIN — ROSUVASTATIN CALCIUM 5 MILLIGRAM(S): 20 TABLET, FILM COATED ORAL at 21:53

## 2025-03-01 RX ADMIN — INSULIN LISPRO 4 UNIT(S): 100 INJECTION, SOLUTION INTRAVENOUS; SUBCUTANEOUS at 11:40

## 2025-03-01 RX ADMIN — INSULIN LISPRO 4 UNIT(S): 100 INJECTION, SOLUTION INTRAVENOUS; SUBCUTANEOUS at 17:04

## 2025-03-01 RX ADMIN — Medication 30 MILLILITER(S): at 13:55

## 2025-03-01 RX ADMIN — POLYETHYLENE GLYCOL 3350 17 GRAM(S): 17 POWDER, FOR SOLUTION ORAL at 21:53

## 2025-03-01 RX ADMIN — Medication 125 MILLILITER(S): at 09:56

## 2025-03-01 RX ADMIN — INSULIN GLARGINE-YFGN 12 UNIT(S): 100 INJECTION, SOLUTION SUBCUTANEOUS at 21:52

## 2025-03-01 RX ADMIN — BRIMONIDINE TARTRATE 1 DROP(S): 1.5 SOLUTION/ DROPS OPHTHALMIC at 21:53

## 2025-03-01 RX ADMIN — Medication 2 TABLET(S): at 21:53

## 2025-03-01 RX ADMIN — Medication 650 MILLIGRAM(S): at 20:25

## 2025-03-01 RX ADMIN — Medication 650 MILLIGRAM(S): at 13:54

## 2025-03-01 RX ADMIN — INSULIN LISPRO 2: 100 INJECTION, SOLUTION INTRAVENOUS; SUBCUTANEOUS at 17:04

## 2025-03-01 RX ADMIN — HEPARIN SODIUM 5000 UNIT(S): 1000 INJECTION INTRAVENOUS; SUBCUTANEOUS at 13:49

## 2025-03-01 RX ADMIN — OXYCODONE HYDROCHLORIDE 10 MILLIGRAM(S): 30 TABLET ORAL at 02:16

## 2025-03-01 RX ADMIN — Medication 2 CAPSULE(S): at 17:04

## 2025-03-01 RX ADMIN — AMLODIPINE BESYLATE 10 MILLIGRAM(S): 10 TABLET ORAL at 09:17

## 2025-03-01 RX ADMIN — Medication 650 MILLIGRAM(S): at 14:30

## 2025-03-01 RX ADMIN — Medication 2 CAPSULE(S): at 11:40

## 2025-03-01 RX ADMIN — BRIMONIDINE TARTRATE 1 DROP(S): 1.5 SOLUTION/ DROPS OPHTHALMIC at 09:16

## 2025-03-01 RX ADMIN — Medication 2 CAPSULE(S): at 07:47

## 2025-03-01 RX ADMIN — Medication 40 MILLIGRAM(S): at 06:45

## 2025-03-01 RX ADMIN — INSULIN LISPRO 4 UNIT(S): 100 INJECTION, SOLUTION INTRAVENOUS; SUBCUTANEOUS at 07:46

## 2025-03-01 RX ADMIN — Medication 30 MILLILITER(S): at 20:25

## 2025-03-01 RX ADMIN — Medication 650 MILLIGRAM(S): at 21:25

## 2025-03-01 NOTE — PROGRESS NOTE ADULT - PROVIDER SPECIALTY LIST ADULT
Gastroenterology
Hospitalist

## 2025-03-01 NOTE — PROGRESS NOTE ADULT - TIME BILLING
- Reviewing, and interpreting labs and testing.  - Independently obtaining a review of systems and performing a physical exam  - Reviewing consultant documentation/recommendations in addition to discussing plan of care with consultants.  - Counselling and educating patient and family regarding interpretation of aforementioned items and plan of care.

## 2025-03-01 NOTE — PROGRESS NOTE ADULT - SUBJECTIVE AND OBJECTIVE BOX
Patient is a 75y old  Male who presents with a chief complaint of pancreatitis (24 Feb 2025 18:14)      Subective:  Feeling better without upper abd pain although now notes lower abdominal discomfort    PAST MEDICAL & SURGICAL HISTORY:  Pancreatitis      HTN (hypertension)      DM (diabetes mellitus)      S/P laparoscopic cholecystectomy          MEDICATIONS  (STANDING):  amLODIPine   Tablet 10 milliGRAM(s) Oral daily  brimonidine 0.2% Ophthalmic Solution 1 Drop(s) Both EYES two times a day  dextrose 5%. 1000 milliLiter(s) (100 mL/Hr) IV Continuous <Continuous>  dextrose 5%. 1000 milliLiter(s) (50 mL/Hr) IV Continuous <Continuous>  dextrose 50% Injectable 25 Gram(s) IV Push once  dextrose 50% Injectable 12.5 Gram(s) IV Push once  dextrose 50% Injectable 25 Gram(s) IV Push once  glucagon  Injectable 1 milliGRAM(s) IntraMuscular once  heparin   Injectable 5000 Unit(s) SubCutaneous every 8 hours  insulin lispro (ADMELOG) corrective regimen sliding scale   SubCutaneous three times a day before meals  pantoprazole    Tablet 40 milliGRAM(s) Oral before breakfast  rosuvastatin 5 milliGRAM(s) Oral at bedtime    MEDICATIONS  (PRN):  acetaminophen     Tablet .. 650 milliGRAM(s) Oral every 6 hours PRN Temp greater or equal to 38C (100.4F), Mild Pain (1 - 3), Moderate Pain (4 - 6)  dextrose Oral Gel 15 Gram(s) Oral once PRN Blood Glucose LESS THAN 70 milliGRAM(s)/deciliter  morphine  - Injectable 4 milliGRAM(s) IV Push every 4 hours PRN Severe Pain (7 - 10)  ondansetron Injectable 4 milliGRAM(s) IV Push every 4 hours PRN Nausea and/or Vomiting      REVIEW OF SYSTEMS:    RESPIRATORY: No shortness of breath  CARDIOVASCULAR: No chest pain  All other review of systems is negative unless indicated above.    Vital Signs Last 24 Hrs  T(C): 36.6 (24 Feb 2025 15:38), Max: 36.9 (24 Feb 2025 07:55)  T(F): 97.9 (24 Feb 2025 15:38), Max: 98.4 (24 Feb 2025 07:55)  HR: 70 (24 Feb 2025 15:38) (67 - 70)  BP: 135/69 (24 Feb 2025 15:38) (125/68 - 137/68)  BP(mean): --  RR: 18 (24 Feb 2025 15:38) (18 - 18)  SpO2: 95% (24 Feb 2025 15:38) (95% - 96%)    Parameters below as of 24 Feb 2025 15:38  Patient On (Oxygen Delivery Method): room air        PHYSICAL EXAM:    Constitutional: NAD, well-developed  Respiratory: CTAB  Cardiovascular: S1 and S2, RRR  Gastrointestinal: BS+, soft, NT/ND  Extremities: No peripheral edema  Psychiatric: Normal mood, normal affect    LABS:                        14.0   6.57  )-----------( 196      ( 24 Feb 2025 07:32 )             41.9     02-24    138  |  107  |  9   ----------------------------<  97  3.9   |  21[L]  |  0.92    Ca    8.8      24 Feb 2025 07:32    TPro  6.3  /  Alb  2.7[L]  /  TBili  0.4  /  DBili  x   /  AST  8[L]  /  ALT  15  /  AlkPhos  68  02-24      LIVER FUNCTIONS - ( 24 Feb 2025 07:32 )  Alb: 2.7 g/dL / Pro: 6.3 gm/dL / ALK PHOS: 68 U/L / ALT: 15 U/L / AST: 8 U/L / GGT: x             RADIOLOGY & ADDITIONAL STUDIES:
Patient is seen and examined. Chart is reviewed. States that his abdominal pain felt worse after his diet was advanced yesterday. Requesting to go back on a CLD.    MEDICATIONS  (STANDING):  amLODIPine   Tablet 10 milliGRAM(s) Oral daily  brimonidine 0.2% Ophthalmic Solution 1 Drop(s) Both EYES two times a day  dextrose 5%. 1000 milliLiter(s) (100 mL/Hr) IV Continuous <Continuous>  dextrose 5%. 1000 milliLiter(s) (50 mL/Hr) IV Continuous <Continuous>  dextrose 50% Injectable 25 Gram(s) IV Push once  dextrose 50% Injectable 12.5 Gram(s) IV Push once  dextrose 50% Injectable 25 Gram(s) IV Push once  glucagon  Injectable 1 milliGRAM(s) IntraMuscular once  heparin   Injectable 5000 Unit(s) SubCutaneous every 8 hours  insulin lispro (ADMELOG) corrective regimen sliding scale   SubCutaneous three times a day before meals  pantoprazole    Tablet 40 milliGRAM(s) Oral before breakfast  rosuvastatin 5 milliGRAM(s) Oral at bedtime    MEDICATIONS  (PRN):  acetaminophen     Tablet .. 650 milliGRAM(s) Oral every 6 hours PRN Temp greater or equal to 38C (100.4F), Mild Pain (1 - 3), Moderate Pain (4 - 6)  dextrose Oral Gel 15 Gram(s) Oral once PRN Blood Glucose LESS THAN 70 milliGRAM(s)/deciliter  morphine  - Injectable 4 milliGRAM(s) IV Push every 4 hours PRN Severe Pain (7 - 10)  ondansetron Injectable 4 milliGRAM(s) IV Push every 4 hours PRN Nausea and/or Vomiting      Vital Signs Last 24 Hrs  T(C): 36.6 (24 Feb 2025 15:38), Max: 36.9 (24 Feb 2025 07:55)  T(F): 97.9 (24 Feb 2025 15:38), Max: 98.4 (24 Feb 2025 07:55)  HR: 70 (24 Feb 2025 15:38) (67 - 70)  BP: 135/69 (24 Feb 2025 15:38) (125/68 - 137/68)  BP(mean): --  RR: 18 (24 Feb 2025 15:38) (18 - 18)  SpO2: 95% (24 Feb 2025 15:38) (95% - 96%)    Parameters below as of 24 Feb 2025 15:38  Patient On (Oxygen Delivery Method): room air        PHYSICAL EXAM:  GENERAL: NAD, lying in bed comfortably  HEAD:  Atraumatic, Normocephalic  EYES: conjunctiva and sclera clear  ENT: Moist mucous membranes  NECK: Supple, No JVD  CHEST/LUNG: Clear to auscultation bilaterally; No rales, rhonchi, wheezing. Unlabored respirations  HEART: Regular rate and rhythm; No murmurs  ABDOMEN: Bowel sounds present; Soft, mild tenderness to deep palpation in the lower quadrants. No rebound tenderness, guarding, or rigidity noted.          LABS:                          14.0   6.57  )-----------( 196      ( 24 Feb 2025 07:32 )             41.9     24 Feb 2025 07:32    138    |  107    |  9      ----------------------------<  97     3.9     |  21     |  0.92     Ca    8.8        24 Feb 2025 07:32    TPro  6.3    /  Alb  2.7    /  TBili  0.4    /  DBili  x      /  AST  8      /  ALT  15     /  AlkPhos  68     24 Feb 2025 07:32    LIVER FUNCTIONS - ( 24 Feb 2025 07:32 )  Alb: 2.7 g/dL / Pro: 6.3 gm/dL / ALK PHOS: 68 U/L / ALT: 15 U/L / AST: 8 U/L / GGT: x             CAPILLARY BLOOD GLUCOSE      POCT Blood Glucose.: 202 mg/dL (24 Feb 2025 16:37)  POCT Blood Glucose.: 100 mg/dL (24 Feb 2025 12:01)  POCT Blood Glucose.: 86 mg/dL (24 Feb 2025 07:40)  POCT Blood Glucose.: 99 mg/dL (23 Feb 2025 22:06)        Urinalysis Basic - ( 24 Feb 2025 07:32 )    Color: x / Appearance: x / SG: x / pH: x  Gluc: 97 mg/dL / Ketone: x  / Bili: x / Urobili: x   Blood: x / Protein: x / Nitrite: x   Leuk Esterase: x / RBC: x / WBC x   Sq Epi: x / Non Sq Epi: x / Bacteria: x        RADIOLOGY:        
Patient is a 75y old  Male who presents with a chief complaint of pancreatitis (24 Feb 2025 19:20)      Subective:  Continues to have lower abdominal pain even though upper abd pain resolved  Appetite good, eating    PAST MEDICAL & SURGICAL HISTORY:  Pancreatitis      HTN (hypertension)      DM (diabetes mellitus)      S/P laparoscopic cholecystectomy          MEDICATIONS  (STANDING):  amLODIPine   Tablet 10 milliGRAM(s) Oral daily  brimonidine 0.2% Ophthalmic Solution 1 Drop(s) Both EYES two times a day  dextrose 5%. 1000 milliLiter(s) (50 mL/Hr) IV Continuous <Continuous>  dextrose 5%. 1000 milliLiter(s) (100 mL/Hr) IV Continuous <Continuous>  dextrose 50% Injectable 25 Gram(s) IV Push once  dextrose 50% Injectable 12.5 Gram(s) IV Push once  dextrose 50% Injectable 25 Gram(s) IV Push once  glucagon  Injectable 1 milliGRAM(s) IntraMuscular once  heparin   Injectable 5000 Unit(s) SubCutaneous every 8 hours  insulin lispro (ADMELOG) corrective regimen sliding scale   SubCutaneous three times a day before meals  pantoprazole    Tablet 40 milliGRAM(s) Oral before breakfast  rosuvastatin 5 milliGRAM(s) Oral at bedtime    MEDICATIONS  (PRN):  acetaminophen     Tablet .. 650 milliGRAM(s) Oral every 6 hours PRN Temp greater or equal to 38C (100.4F), Mild Pain (1 - 3), Moderate Pain (4 - 6)  dextrose Oral Gel 15 Gram(s) Oral once PRN Blood Glucose LESS THAN 70 milliGRAM(s)/deciliter  morphine  - Injectable 4 milliGRAM(s) IV Push every 4 hours PRN Severe Pain (7 - 10)  ondansetron Injectable 4 milliGRAM(s) IV Push every 4 hours PRN Nausea and/or Vomiting      REVIEW OF SYSTEMS:    RESPIRATORY: No shortness of breath  CARDIOVASCULAR: No chest pain  All other review of systems is negative unless indicated above.    Vital Signs Last 24 Hrs  T(C): 36.5 (25 Feb 2025 08:10), Max: 36.6 (24 Feb 2025 15:38)  T(F): 97.7 (25 Feb 2025 08:10), Max: 97.9 (24 Feb 2025 15:38)  HR: 63 (25 Feb 2025 08:10) (62 - 72)  BP: 134/69 (25 Feb 2025 08:10) (117/76 - 135/69)  BP(mean): --  RR: 17 (25 Feb 2025 08:10) (17 - 18)  SpO2: 99% (25 Feb 2025 08:10) (95% - 100%)    Parameters below as of 25 Feb 2025 08:10  Patient On (Oxygen Delivery Method): room air        PHYSICAL EXAM:    Constitutional: NAD, well-developed  Respiratory: CTAB  Cardiovascular: S1 and S2, RRR  Gastrointestinal: BS+, soft, NT/ND  Extremities: No peripheral edema  Psychiatric: Normal mood, normal affect    LABS:                        14.0   6.57  )-----------( 196      ( 24 Feb 2025 07:32 )             41.9     02-24    138  |  107  |  9   ----------------------------<  97  3.9   |  21[L]  |  0.92    Ca    8.8      24 Feb 2025 07:32    TPro  6.3  /  Alb  2.7[L]  /  TBili  0.4  /  DBili  x   /  AST  8[L]  /  ALT  15  /  AlkPhos  68  02-24      LIVER FUNCTIONS - ( 24 Feb 2025 07:32 )  Alb: 2.7 g/dL / Pro: 6.3 gm/dL / ALK PHOS: 68 U/L / ALT: 15 U/L / AST: 8 U/L / GGT: x             RADIOLOGY & ADDITIONAL STUDIES:
Patient is seen and examined. Chart is reviewed. Reports lower abdominal pain this morning. CT A/P ordered    MEDICATIONS  (STANDING):  amLODIPine   Tablet 10 milliGRAM(s) Oral daily  brimonidine 0.2% Ophthalmic Solution 1 Drop(s) Both EYES two times a day  dextrose 5%. 1000 milliLiter(s) (100 mL/Hr) IV Continuous <Continuous>  dextrose 5%. 1000 milliLiter(s) (50 mL/Hr) IV Continuous <Continuous>  dextrose 50% Injectable 25 Gram(s) IV Push once  dextrose 50% Injectable 12.5 Gram(s) IV Push once  dextrose 50% Injectable 25 Gram(s) IV Push once  glucagon  Injectable 1 milliGRAM(s) IntraMuscular once  heparin   Injectable 5000 Unit(s) SubCutaneous every 8 hours  insulin lispro (ADMELOG) corrective regimen sliding scale   SubCutaneous three times a day before meals  pantoprazole    Tablet 40 milliGRAM(s) Oral before breakfast  rosuvastatin 5 milliGRAM(s) Oral at bedtime    MEDICATIONS  (PRN):  acetaminophen     Tablet .. 650 milliGRAM(s) Oral every 6 hours PRN Temp greater or equal to 38C (100.4F), Mild Pain (1 - 3), Moderate Pain (4 - 6)  dextrose Oral Gel 15 Gram(s) Oral once PRN Blood Glucose LESS THAN 70 milliGRAM(s)/deciliter  morphine  - Injectable 4 milliGRAM(s) IV Push every 4 hours PRN Severe Pain (7 - 10)  ondansetron Injectable 4 milliGRAM(s) IV Push every 4 hours PRN Nausea and/or Vomiting      Vital Signs Last 24 Hrs  T(C): 36.6 (24 Feb 2025 15:38), Max: 36.9 (24 Feb 2025 07:55)  T(F): 97.9 (24 Feb 2025 15:38), Max: 98.4 (24 Feb 2025 07:55)  HR: 70 (24 Feb 2025 15:38) (67 - 70)  BP: 135/69 (24 Feb 2025 15:38) (125/68 - 137/68)  BP(mean): --  RR: 18 (24 Feb 2025 15:38) (18 - 18)  SpO2: 95% (24 Feb 2025 15:38) (95% - 96%)    Parameters below as of 24 Feb 2025 15:38  Patient On (Oxygen Delivery Method): room air        PHYSICAL EXAM:  GENERAL: NAD, lying in bed comfortably  HEAD:  Atraumatic, Normocephalic  EYES: conjunctiva and sclera clear  ENT: Moist mucous membranes  NECK: Supple, No JVD  CHEST/LUNG: Clear to auscultation bilaterally; No rales, rhonchi, wheezing. Unlabored respirations  HEART: Regular rate and rhythm; No murmurs  ABDOMEN: Bowel sounds present; Soft, mild tenderness to deep palpation in the lower quadrants. No rebound tenderness, guarding, or rigidity noted.          LABS:                          14.0   6.57  )-----------( 196      ( 24 Feb 2025 07:32 )             41.9     24 Feb 2025 07:32    138    |  107    |  9      ----------------------------<  97     3.9     |  21     |  0.92     Ca    8.8        24 Feb 2025 07:32    TPro  6.3    /  Alb  2.7    /  TBili  0.4    /  DBili  x      /  AST  8      /  ALT  15     /  AlkPhos  68     24 Feb 2025 07:32    LIVER FUNCTIONS - ( 24 Feb 2025 07:32 )  Alb: 2.7 g/dL / Pro: 6.3 gm/dL / ALK PHOS: 68 U/L / ALT: 15 U/L / AST: 8 U/L / GGT: x             CAPILLARY BLOOD GLUCOSE      POCT Blood Glucose.: 202 mg/dL (24 Feb 2025 16:37)  POCT Blood Glucose.: 100 mg/dL (24 Feb 2025 12:01)  POCT Blood Glucose.: 86 mg/dL (24 Feb 2025 07:40)  POCT Blood Glucose.: 99 mg/dL (23 Feb 2025 22:06)        Urinalysis Basic - ( 24 Feb 2025 07:32 )    Color: x / Appearance: x / SG: x / pH: x  Gluc: 97 mg/dL / Ketone: x  / Bili: x / Urobili: x   Blood: x / Protein: x / Nitrite: x   Leuk Esterase: x / RBC: x / WBC x   Sq Epi: x / Non Sq Epi: x / Bacteria: x        RADIOLOGY:        
Patient is seen and examined. Chart is reviewed. his abd pain is improved, pending MRI today.     Gen: No fever, chills, weakness  ENT: No visual changes or throat pain  Neck: No pain or stiffness  Respiratory: No cough or wheezing  Cardiovascular: No chest pain or palpitations  Gastrointestinal: No abdominal pain, nausea, vomiting, constipation, or diarrhea  Hematologic: No easy bleeding or bruising  Neurologic: No numbness or focal weakness  Psych: No depression or insomnia  Skin: No rash or itching      MEDICATIONS  (STANDING):  amLODIPine   Tablet 10 milliGRAM(s) Oral daily  brimonidine 0.2% Ophthalmic Solution 1 Drop(s) Both EYES two times a day  dextrose 5%. 1000 milliLiter(s) (100 mL/Hr) IV Continuous <Continuous>  dextrose 5%. 1000 milliLiter(s) (50 mL/Hr) IV Continuous <Continuous>  dextrose 50% Injectable 25 Gram(s) IV Push once  dextrose 50% Injectable 12.5 Gram(s) IV Push once  dextrose 50% Injectable 25 Gram(s) IV Push once  glucagon  Injectable 1 milliGRAM(s) IntraMuscular once  heparin   Injectable 5000 Unit(s) SubCutaneous every 8 hours  insulin lispro (ADMELOG) corrective regimen sliding scale   SubCutaneous three times a day before meals  pantoprazole    Tablet 40 milliGRAM(s) Oral before breakfast  rosuvastatin 5 milliGRAM(s) Oral at bedtime    MEDICATIONS  (PRN):  acetaminophen     Tablet .. 650 milliGRAM(s) Oral every 6 hours PRN Temp greater or equal to 38C (100.4F), Mild Pain (1 - 3), Moderate Pain (4 - 6)  dextrose Oral Gel 15 Gram(s) Oral once PRN Blood Glucose LESS THAN 70 milliGRAM(s)/deciliter  morphine  - Injectable 4 milliGRAM(s) IV Push every 4 hours PRN Severe Pain (7 - 10)  ondansetron Injectable 4 milliGRAM(s) IV Push every 4 hours PRN Nausea and/or Vomiting      Vital Signs Last 24 Hrs  T(C): 36.6 (24 Feb 2025 15:38), Max: 36.9 (24 Feb 2025 07:55)  T(F): 97.9 (24 Feb 2025 15:38), Max: 98.4 (24 Feb 2025 07:55)  HR: 70 (24 Feb 2025 15:38) (67 - 70)  BP: 135/69 (24 Feb 2025 15:38) (125/68 - 137/68)  BP(mean): --  RR: 18 (24 Feb 2025 15:38) (18 - 18)  SpO2: 95% (24 Feb 2025 15:38) (95% - 96%)    Parameters below as of 24 Feb 2025 15:38  Patient On (Oxygen Delivery Method): room air        PHYSICAL EXAM:  GENERAL: NAD, lying in bed comfortably  HEAD:  Atraumatic, Normocephalic  EYES: conjunctiva and sclera clear  ENT: Moist mucous membranes  NECK: Supple, No JVD  CHEST/LUNG: Clear to auscultation bilaterally; No rales, rhonchi, wheezing. Unlabored respirations  HEART: Regular rate and rhythm; No murmurs  ABDOMEN: Bowel sounds present; Soft, Nontender, Nondistended.   EXTREMITIES:  2+ Peripheral Pulses, brisk capillary refill. No clubbing, cyanosis, or edema  NERVOUS SYSTEM:  Alert & Oriented X3, speech clear. No deficits   MSK: FROM all 4 extremities, full and equal strength          LABS:                          14.0   6.57  )-----------( 196      ( 24 Feb 2025 07:32 )             41.9     24 Feb 2025 07:32    138    |  107    |  9      ----------------------------<  97     3.9     |  21     |  0.92     Ca    8.8        24 Feb 2025 07:32    TPro  6.3    /  Alb  2.7    /  TBili  0.4    /  DBili  x      /  AST  8      /  ALT  15     /  AlkPhos  68     24 Feb 2025 07:32    LIVER FUNCTIONS - ( 24 Feb 2025 07:32 )  Alb: 2.7 g/dL / Pro: 6.3 gm/dL / ALK PHOS: 68 U/L / ALT: 15 U/L / AST: 8 U/L / GGT: x             CAPILLARY BLOOD GLUCOSE      POCT Blood Glucose.: 202 mg/dL (24 Feb 2025 16:37)  POCT Blood Glucose.: 100 mg/dL (24 Feb 2025 12:01)  POCT Blood Glucose.: 86 mg/dL (24 Feb 2025 07:40)  POCT Blood Glucose.: 99 mg/dL (23 Feb 2025 22:06)        Urinalysis Basic - ( 24 Feb 2025 07:32 )    Color: x / Appearance: x / SG: x / pH: x  Gluc: 97 mg/dL / Ketone: x  / Bili: x / Urobili: x   Blood: x / Protein: x / Nitrite: x   Leuk Esterase: x / RBC: x / WBC x   Sq Epi: x / Non Sq Epi: x / Bacteria: x        RADIOLOGY:        
Patient is seen and examined. Continues to have abdominal pain and with decreased PO intake. Seen by GI this morning--cont supportive care for now.      MEDICATIONS  (STANDING):  amLODIPine   Tablet 10 milliGRAM(s) Oral daily  brimonidine 0.2% Ophthalmic Solution 1 Drop(s) Both EYES two times a day  dextrose 5%. 1000 milliLiter(s) (100 mL/Hr) IV Continuous <Continuous>  dextrose 5%. 1000 milliLiter(s) (50 mL/Hr) IV Continuous <Continuous>  dextrose 50% Injectable 25 Gram(s) IV Push once  dextrose 50% Injectable 12.5 Gram(s) IV Push once  dextrose 50% Injectable 25 Gram(s) IV Push once  glucagon  Injectable 1 milliGRAM(s) IntraMuscular once  heparin   Injectable 5000 Unit(s) SubCutaneous every 8 hours  insulin lispro (ADMELOG) corrective regimen sliding scale   SubCutaneous three times a day before meals  pantoprazole    Tablet 40 milliGRAM(s) Oral before breakfast  rosuvastatin 5 milliGRAM(s) Oral at bedtime    MEDICATIONS  (PRN):  acetaminophen     Tablet .. 650 milliGRAM(s) Oral every 6 hours PRN Temp greater or equal to 38C (100.4F), Mild Pain (1 - 3), Moderate Pain (4 - 6)  dextrose Oral Gel 15 Gram(s) Oral once PRN Blood Glucose LESS THAN 70 milliGRAM(s)/deciliter  morphine  - Injectable 4 milliGRAM(s) IV Push every 4 hours PRN Severe Pain (7 - 10)  ondansetron Injectable 4 milliGRAM(s) IV Push every 4 hours PRN Nausea and/or Vomiting      Vital Signs Last 24 Hrs  T(C): 36.6 (24 Feb 2025 15:38), Max: 36.9 (24 Feb 2025 07:55)  T(F): 97.9 (24 Feb 2025 15:38), Max: 98.4 (24 Feb 2025 07:55)  HR: 70 (24 Feb 2025 15:38) (67 - 70)  BP: 135/69 (24 Feb 2025 15:38) (125/68 - 137/68)  BP(mean): --  RR: 18 (24 Feb 2025 15:38) (18 - 18)  SpO2: 95% (24 Feb 2025 15:38) (95% - 96%)    Parameters below as of 24 Feb 2025 15:38  Patient On (Oxygen Delivery Method): room air        PHYSICAL EXAM:  GENERAL: NAD, lying in bed comfortably  HEAD:  Atraumatic, Normocephalic  EYES: conjunctiva and sclera clear  ENT: Moist mucous membranes  NECK: Supple, No JVD  CHEST/LUNG: Clear to auscultation bilaterally; No rales, rhonchi, wheezing. Unlabored respirations  HEART: Regular rate and rhythm; No murmurs  ABDOMEN: Bowel sounds present; Soft, mild tenderness to deep palpation in the RUQ abdominal quadrant. No rebound tenderness, guarding, or rigidity noted.          LABS:                          14.0   6.57  )-----------( 196      ( 24 Feb 2025 07:32 )             41.9     24 Feb 2025 07:32    138    |  107    |  9      ----------------------------<  97     3.9     |  21     |  0.92     Ca    8.8        24 Feb 2025 07:32    TPro  6.3    /  Alb  2.7    /  TBili  0.4    /  DBili  x      /  AST  8      /  ALT  15     /  AlkPhos  68     24 Feb 2025 07:32    LIVER FUNCTIONS - ( 24 Feb 2025 07:32 )  Alb: 2.7 g/dL / Pro: 6.3 gm/dL / ALK PHOS: 68 U/L / ALT: 15 U/L / AST: 8 U/L / GGT: x             CAPILLARY BLOOD GLUCOSE      POCT Blood Glucose.: 202 mg/dL (24 Feb 2025 16:37)  POCT Blood Glucose.: 100 mg/dL (24 Feb 2025 12:01)  POCT Blood Glucose.: 86 mg/dL (24 Feb 2025 07:40)  POCT Blood Glucose.: 99 mg/dL (23 Feb 2025 22:06)        Urinalysis Basic - ( 24 Feb 2025 07:32 )    Color: x / Appearance: x / SG: x / pH: x  Gluc: 97 mg/dL / Ketone: x  / Bili: x / Urobili: x   Blood: x / Protein: x / Nitrite: x   Leuk Esterase: x / RBC: x / WBC x   Sq Epi: x / Non Sq Epi: x / Bacteria: x        RADIOLOGY:        
Patient is seen and examined. Chart is reviewed. Admitted for acute pancreatitis. Abd pain is improving.    Gen: No fever, chills, weakness  ENT: No visual changes or throat pain  Neck: No pain or stiffness  Respiratory: No cough or wheezing  Cardiovascular: No chest pain or palpitations  Gastrointestinal: No abdominal pain, nausea, vomiting, constipation, or diarrhea  Hematologic: No easy bleeding or bruising  Neurologic: No numbness or focal weakness  Psych: No depression or insomnia  Skin: No rash or itching      MEDICATIONS  (STANDING):  amLODIPine   Tablet 10 milliGRAM(s) Oral daily  brimonidine 0.2% Ophthalmic Solution 1 Drop(s) Both EYES two times a day  dextrose 5%. 1000 milliLiter(s) (100 mL/Hr) IV Continuous <Continuous>  dextrose 5%. 1000 milliLiter(s) (50 mL/Hr) IV Continuous <Continuous>  dextrose 50% Injectable 25 Gram(s) IV Push once  dextrose 50% Injectable 12.5 Gram(s) IV Push once  dextrose 50% Injectable 25 Gram(s) IV Push once  glucagon  Injectable 1 milliGRAM(s) IntraMuscular once  heparin   Injectable 5000 Unit(s) SubCutaneous every 8 hours  insulin glargine Injectable (LANTUS) 10 Unit(s) SubCutaneous every morning  insulin lispro (ADMELOG) corrective regimen sliding scale   SubCutaneous three times a day before meals  pantoprazole    Tablet 40 milliGRAM(s) Oral before breakfast  rosuvastatin 5 milliGRAM(s) Oral at bedtime  sodium chloride 0.9%. 1000 milliLiter(s) (75 mL/Hr) IV Continuous <Continuous>    MEDICATIONS  (PRN):  acetaminophen     Tablet .. 650 milliGRAM(s) Oral every 6 hours PRN Temp greater or equal to 38C (100.4F), Mild Pain (1 - 3), Moderate Pain (4 - 6)  dextrose Oral Gel 15 Gram(s) Oral once PRN Blood Glucose LESS THAN 70 milliGRAM(s)/deciliter  morphine  - Injectable 4 milliGRAM(s) IV Push every 4 hours PRN Severe Pain (7 - 10)  ondansetron Injectable 4 milliGRAM(s) IV Push every 4 hours PRN Nausea and/or Vomiting      Vital Signs Last 24 Hrs  T(C): 36.6 (23 Feb 2025 09:00), Max: 37.1 (22 Feb 2025 18:31)  T(F): 97.9 (23 Feb 2025 09:00), Max: 98.8 (22 Feb 2025 18:31)  HR: 65 (23 Feb 2025 09:00) (59 - 76)  BP: 133/76 (23 Feb 2025 09:00) (126/69 - 145/75)  BP(mean): 82 (22 Feb 2025 23:35) (82 - 97)  RR: 18 (23 Feb 2025 09:00) (18 - 18)  SpO2: 96% (23 Feb 2025 09:00) (96% - 100%)    Parameters below as of 23 Feb 2025 09:00  Patient On (Oxygen Delivery Method): room air      PHYSICAL EXAM:  GENERAL: NAD, lying in bed comfortably  HEAD:  Atraumatic, Normocephalic  EYES: conjunctiva and sclera clear  ENT: Moist mucous membranes  NECK: Supple, No JVD  CHEST/LUNG: Clear to auscultation bilaterally; No rales, rhonchi, wheezing. Unlabored respirations  HEART: Regular rate and rhythm; No murmurs  ABDOMEN: Bowel sounds present; Soft, mildly epigastric tenderness, Nondistended.   EXTREMITIES:  2+ Peripheral Pulses, brisk capillary refill. No clubbing, cyanosis, or edema  NERVOUS SYSTEM:  Alert & Oriented X3, speech clear. No deficits   MSK: FROM all 4 extremities, full and equal strength        LABS:                          13.7   7.21  )-----------( 209      ( 23 Feb 2025 07:50 )             42.6     23 Feb 2025 07:50    138    |  108    |  13     ----------------------------<  116    4.2     |  23     |  1.04     Ca    8.8        23 Feb 2025 07:50    TPro  6.5    /  Alb  2.9    /  TBili  0.5    /  DBili  x      /  AST  11     /  ALT  16     /  AlkPhos  68     23 Feb 2025 07:50    LIVER FUNCTIONS - ( 23 Feb 2025 07:50 )  Alb: 2.9 g/dL / Pro: 6.5 gm/dL / ALK PHOS: 68 U/L / ALT: 16 U/L / AST: 11 U/L / GGT: x             CAPILLARY BLOOD GLUCOSE      POCT Blood Glucose.: 120 mg/dL (23 Feb 2025 11:51)  POCT Blood Glucose.: 112 mg/dL (23 Feb 2025 08:13)        Urinalysis Basic - ( 23 Feb 2025 07:50 )    Color: x / Appearance: x / SG: x / pH: x  Gluc: 116 mg/dL / Ketone: x  / Bili: x / Urobili: x   Blood: x / Protein: x / Nitrite: x   Leuk Esterase: x / RBC: x / WBC x   Sq Epi: x / Non Sq Epi: x / Bacteria: x        RADIOLOGY:        
Patient is a 75y old  Male who presents with a chief complaint of pancreatitis (26 Feb 2025 15:21)      Subective:  Notes RUQ pain again, far laterally but lower abd pain resolved    PAST MEDICAL & SURGICAL HISTORY:  Pancreatitis      HTN (hypertension)      DM (diabetes mellitus)      S/P laparoscopic cholecystectomy          MEDICATIONS  (STANDING):  amLODIPine   Tablet 10 milliGRAM(s) Oral daily  brimonidine 0.2% Ophthalmic Solution 1 Drop(s) Both EYES two times a day  dextrose 5%. 1000 milliLiter(s) (100 mL/Hr) IV Continuous <Continuous>  dextrose 5%. 1000 milliLiter(s) (50 mL/Hr) IV Continuous <Continuous>  dextrose 50% Injectable 25 Gram(s) IV Push once  dextrose 50% Injectable 12.5 Gram(s) IV Push once  dextrose 50% Injectable 25 Gram(s) IV Push once  glucagon  Injectable 1 milliGRAM(s) IntraMuscular once  heparin   Injectable 5000 Unit(s) SubCutaneous every 8 hours  insulin glargine Injectable (LANTUS) 12 Unit(s) SubCutaneous at bedtime  insulin lispro (ADMELOG) corrective regimen sliding scale   SubCutaneous three times a day before meals  insulin lispro Injectable (ADMELOG) 4 Unit(s) SubCutaneous three times a day before meals  pancrelipase  (CREON 12,000 Lipase Units) 2 Capsule(s) Oral three times a day with meals  pantoprazole    Tablet 40 milliGRAM(s) Oral before breakfast  rosuvastatin 5 milliGRAM(s) Oral at bedtime    MEDICATIONS  (PRN):  acetaminophen     Tablet .. 650 milliGRAM(s) Oral every 6 hours PRN Temp greater or equal to 38C (100.4F), Mild Pain (1 - 3), Moderate Pain (4 - 6)  dextrose Oral Gel 15 Gram(s) Oral once PRN Blood Glucose LESS THAN 70 milliGRAM(s)/deciliter  morphine  - Injectable 4 milliGRAM(s) IV Push every 4 hours PRN Severe Pain (7 - 10)  ondansetron Injectable 4 milliGRAM(s) IV Push every 4 hours PRN Nausea and/or Vomiting      REVIEW OF SYSTEMS:    RESPIRATORY: No shortness of breath  CARDIOVASCULAR: No chest pain  All other review of systems is negative unless indicated above.    Vital Signs Last 24 Hrs  T(C): 36.7 (27 Feb 2025 00:24), Max: 36.7 (27 Feb 2025 00:24)  T(F): 98.1 (27 Feb 2025 00:24), Max: 98.1 (27 Feb 2025 00:24)  HR: 56 (27 Feb 2025 00:24) (56 - 59)  BP: 130/66 (27 Feb 2025 00:24) (128/64 - 135/67)  BP(mean): --  RR: 19 (27 Feb 2025 00:24) (19 - 19)  SpO2: 95% (27 Feb 2025 00:24) (95% - 96%)    Parameters below as of 26 Feb 2025 14:48  Patient On (Oxygen Delivery Method): room air        PHYSICAL EXAM:    Constitutional: NAD, well-developed  Respiratory: CTAB  Cardiovascular: S1 and S2, RRR  Gastrointestinal: BS+, soft, NT/ND  Extremities: No peripheral edema  Psychiatric: Normal mood, normal affect    LABS:                RADIOLOGY & ADDITIONAL STUDIES:
Patient is seen and examined. chart is reviewed. Abdominal pain is improving.    Gen: No fever, chills, weakness  ENT: No visual changes or throat pain  Neck: No pain or stiffness  Respiratory: No cough or wheezing  Cardiovascular: No chest pain or palpitations  Gastrointestinal: No abdominal pain, nausea, vomiting, constipation, or diarrhea  Hematologic: No easy bleeding or bruising  Neurologic: No numbness or focal weakness  Psych: No depression or insomnia  Skin: No rash or itching    MEDICATIONS  (STANDING):  amLODIPine   Tablet 10 milliGRAM(s) Oral daily  brimonidine 0.2% Ophthalmic Solution 1 Drop(s) Both EYES two times a day  dextrose 5%. 1000 milliLiter(s) (100 mL/Hr) IV Continuous <Continuous>  dextrose 5%. 1000 milliLiter(s) (50 mL/Hr) IV Continuous <Continuous>  dextrose 50% Injectable 25 Gram(s) IV Push once  dextrose 50% Injectable 12.5 Gram(s) IV Push once  dextrose 50% Injectable 25 Gram(s) IV Push once  glucagon  Injectable 1 milliGRAM(s) IntraMuscular once  heparin   Injectable 5000 Unit(s) SubCutaneous every 8 hours  insulin glargine Injectable (LANTUS) 12 Unit(s) SubCutaneous at bedtime  insulin lispro (ADMELOG) corrective regimen sliding scale   SubCutaneous three times a day before meals  insulin lispro Injectable (ADMELOG) 4 Unit(s) SubCutaneous three times a day before meals  pancrelipase  (CREON 12,000 Lipase Units) 2 Capsule(s) Oral three times a day with meals  pantoprazole    Tablet 40 milliGRAM(s) Oral before breakfast  rosuvastatin 5 milliGRAM(s) Oral at bedtime    MEDICATIONS  (PRN):  acetaminophen     Tablet .. 650 milliGRAM(s) Oral every 6 hours PRN Temp greater or equal to 38C (100.4F), Mild Pain (1 - 3), Moderate Pain (4 - 6)  aluminum hydroxide/magnesium hydroxide/simethicone Suspension 30 milliLiter(s) Oral every 4 hours PRN Dyspepsia  dextrose Oral Gel 15 Gram(s) Oral once PRN Blood Glucose LESS THAN 70 milliGRAM(s)/deciliter  melatonin 5 milliGRAM(s) Oral at bedtime PRN Sleep  ondansetron Injectable 4 milliGRAM(s) IV Push every 4 hours PRN Nausea and/or Vomiting  oxyCODONE    IR 5 milliGRAM(s) Oral every 4 hours PRN Moderate Pain (4 - 6)  oxyCODONE    IR 10 milliGRAM(s) Oral every 4 hours PRN Severe Pain (7 - 10)  polyethylene glycol 3350 17 Gram(s) Oral daily PRN Constipation  senna 2 Tablet(s) Oral at bedtime PRN Constipation      Vital Signs Last 24 Hrs  T(C): 36.7 (01 Mar 2025 07:54), Max: 36.9 (01 Mar 2025 00:17)  T(F): 98.1 (01 Mar 2025 07:54), Max: 98.4 (01 Mar 2025 00:17)  HR: 69 (01 Mar 2025 09:10) (56 - 69)  BP: 147/80 (01 Mar 2025 09:10) (129/74 - 147/80)  BP(mean): --  RR: 19 (01 Mar 2025 07:54) (18 - 19)  SpO2: 95% (01 Mar 2025 07:54) (95% - 96%)    Parameters below as of 01 Mar 2025 07:54  Patient On (Oxygen Delivery Method): room air      PHYSICAL EXAM:  GENERAL: NAD, lying in bed comfortably  HEAD:  Atraumatic, Normocephalic  EYES: conjunctiva and sclera clear  ENT: Moist mucous membranes  NECK: Supple, No JVD  CHEST/LUNG: Clear to auscultation bilaterally; No rales, rhonchi, wheezing. Unlabored respirations  HEART: Regular rate and rhythm; No murmurs  ABDOMEN: Bowel sounds present; Soft, Nontender, Nondistended.   EXTREMITIES:  2+ Peripheral Pulses, brisk capillary refill. No clubbing, cyanosis, or edema  NERVOUS SYSTEM:  Alert & Oriented X3, speech clear. No deficits   MSK: FROM all 4 extremities, full and equal strength          LABS:                          13.6   6.50  )-----------( 226      ( 28 Feb 2025 07:21 )             41.4     28 Feb 2025 07:21    137    |  109    |  13     ----------------------------<  140    3.8     |  23     |  0.99     Ca    8.6        28 Feb 2025 07:21    TPro  6.7    /  Alb  2.7    /  TBili  0.3    /  DBili  x      /  AST  23     /  ALT  42     /  AlkPhos  70     28 Feb 2025 07:21    LIVER FUNCTIONS - ( 28 Feb 2025 07:21 )  Alb: 2.7 g/dL / Pro: 6.7 gm/dL / ALK PHOS: 70 U/L / ALT: 42 U/L / AST: 23 U/L / GGT: x             CAPILLARY BLOOD GLUCOSE      POCT Blood Glucose.: 137 mg/dL (01 Mar 2025 11:35)  POCT Blood Glucose.: 132 mg/dL (01 Mar 2025 07:28)  POCT Blood Glucose.: 147 mg/dL (28 Feb 2025 20:28)  POCT Blood Glucose.: 102 mg/dL (28 Feb 2025 17:03)        Urinalysis Basic - ( 28 Feb 2025 07:21 )    Color: x / Appearance: x / SG: x / pH: x  Gluc: 140 mg/dL / Ketone: x  / Bili: x / Urobili: x   Blood: x / Protein: x / Nitrite: x   Leuk Esterase: x / RBC: x / WBC x   Sq Epi: x / Non Sq Epi: x / Bacteria: x        RADIOLOGY:

## 2025-03-01 NOTE — PROGRESS NOTE ADULT - ASSESSMENT
A/P:    #Acute recurrent pancreatitis   - Cont PO Oxycodone prn  - Diet as tolerated  - Resume Creon  - Zofran prn  - MRI abd notable for Subtle enlargement and abnormal signal in the pancreatic head -will need follow up with GI as an outpt for EUS   - CT A/P notable for Mild mural thickening in the descending colon, which may represent colitis  - IgG 4 level wnl  - Patient continues to have mild abdominal pain with PO intake ( improved)      - GI input noted; discussed case with Dr. León-->Cont supportive care with pain meds for now, will need follow up EUS as an outpatient once pancreatitis episode is resolved    Type II DM with hyperglycemia  - Cont Basal, Bolus regimen as ordered + sliding scale    HLD  -on crestor 5 mg daily     HTN  -on norvasc 5 mg daily     DVT ppx  -heparin sc     Disposition: Anticipate D/C home in 1-2 days pending improvement of abdominal pain and PO intake.

## 2025-03-02 VITALS — DIASTOLIC BLOOD PRESSURE: 78 MMHG | HEART RATE: 62 BPM | SYSTOLIC BLOOD PRESSURE: 133 MMHG

## 2025-03-02 LAB
GLUCOSE BLDC GLUCOMTR-MCNC: 141 MG/DL — HIGH (ref 70–99)
GLUCOSE BLDC GLUCOMTR-MCNC: 179 MG/DL — HIGH (ref 70–99)

## 2025-03-02 PROCEDURE — 99239 HOSP IP/OBS DSCHRG MGMT >30: CPT

## 2025-03-02 RX ORDER — LACTULOSE 10 G/15ML
10 SOLUTION ORAL EVERY 8 HOURS
Refills: 0 | Status: DISCONTINUED | OUTPATIENT
Start: 2025-03-02 | End: 2025-03-02

## 2025-03-02 RX ORDER — SENNA 187 MG
2 TABLET ORAL
Qty: 60 | Refills: 0
Start: 2025-03-02 | End: 2025-03-31

## 2025-03-02 RX ORDER — POLYETHYLENE GLYCOL 3350 17 G/17G
17 POWDER, FOR SOLUTION ORAL
Qty: 510 | Refills: 0
Start: 2025-03-02 | End: 2025-03-31

## 2025-03-02 RX ORDER — KETOROLAC TROMETHAMINE 30 MG/ML
15 INJECTION, SOLUTION INTRAMUSCULAR; INTRAVENOUS EVERY 8 HOURS
Refills: 0 | Status: DISCONTINUED | OUTPATIENT
Start: 2025-03-02 | End: 2025-03-02

## 2025-03-02 RX ADMIN — Medication 2 CAPSULE(S): at 07:33

## 2025-03-02 RX ADMIN — KETOROLAC TROMETHAMINE 15 MILLIGRAM(S): 30 INJECTION, SOLUTION INTRAMUSCULAR; INTRAVENOUS at 00:47

## 2025-03-02 RX ADMIN — INSULIN LISPRO 4 UNIT(S): 100 INJECTION, SOLUTION INTRAVENOUS; SUBCUTANEOUS at 07:33

## 2025-03-02 RX ADMIN — Medication 2 CAPSULE(S): at 11:34

## 2025-03-02 RX ADMIN — LACTULOSE 10 GRAM(S): 10 SOLUTION ORAL at 00:48

## 2025-03-02 RX ADMIN — LACTULOSE 10 GRAM(S): 10 SOLUTION ORAL at 07:32

## 2025-03-02 RX ADMIN — BRIMONIDINE TARTRATE 1 DROP(S): 1.5 SOLUTION/ DROPS OPHTHALMIC at 09:20

## 2025-03-02 RX ADMIN — Medication 40 MILLIGRAM(S): at 05:42

## 2025-03-02 RX ADMIN — AMLODIPINE BESYLATE 10 MILLIGRAM(S): 10 TABLET ORAL at 09:21

## 2025-03-02 RX ADMIN — INSULIN LISPRO 1: 100 INJECTION, SOLUTION INTRAVENOUS; SUBCUTANEOUS at 11:34

## 2025-03-02 RX ADMIN — KETOROLAC TROMETHAMINE 15 MILLIGRAM(S): 30 INJECTION, SOLUTION INTRAMUSCULAR; INTRAVENOUS at 01:02

## 2025-03-02 RX ADMIN — INSULIN LISPRO 4 UNIT(S): 100 INJECTION, SOLUTION INTRAVENOUS; SUBCUTANEOUS at 11:35

## 2025-03-02 NOTE — DISCHARGE NOTE NURSING/CASE MANAGEMENT/SOCIAL WORK - PATIENT PORTAL LINK FT
You can access the FollowMyHealth Patient Portal offered by [facility] by registering at the following website: [email or web address]. By joining [employer]s FollowMyHealth portal, you will also be able to view your health information using other applications (apps) compatible with our system.

## 2025-03-02 NOTE — DISCHARGE NOTE NURSING/CASE MANAGEMENT/SOCIAL WORK - FINANCIAL ASSISTANCE
[facility] provides services at a reduced cost to those who are determined to be eligible through [facility]’s financial assistance program. Information regarding [facility]’s financial assistance program can be found by going to [email or web address] or by calling [phone].

## 2025-03-10 DIAGNOSIS — Z79.4 LONG TERM (CURRENT) USE OF INSULIN: ICD-10-CM

## 2025-03-10 DIAGNOSIS — I10 ESSENTIAL (PRIMARY) HYPERTENSION: ICD-10-CM

## 2025-03-10 DIAGNOSIS — Z80.9 FAMILY HISTORY OF MALIGNANT NEOPLASM, UNSPECIFIED: ICD-10-CM

## 2025-03-10 DIAGNOSIS — E11.65 TYPE 2 DIABETES MELLITUS WITH HYPERGLYCEMIA: ICD-10-CM

## 2025-03-10 DIAGNOSIS — Z79.84 LONG TERM (CURRENT) USE OF ORAL HYPOGLYCEMIC DRUGS: ICD-10-CM

## 2025-03-10 DIAGNOSIS — Z83.3 FAMILY HISTORY OF DIABETES MELLITUS: ICD-10-CM

## 2025-03-29 NOTE — PATIENT PROFILE ADULT - FOOD INSECURITY
" I was at work and got my finger pricked with a needle" pt states the needle was from a diabetic person that was left on the floor, pt noticed blood to his middle finger on right hand.
no